# Patient Record
Sex: FEMALE | Race: WHITE | NOT HISPANIC OR LATINO | URBAN - METROPOLITAN AREA
[De-identification: names, ages, dates, MRNs, and addresses within clinical notes are randomized per-mention and may not be internally consistent; named-entity substitution may affect disease eponyms.]

---

## 2017-09-08 ENCOUNTER — OUTPATIENT (OUTPATIENT)
Dept: OUTPATIENT SERVICES | Facility: HOSPITAL | Age: 77
LOS: 1 days | End: 2017-09-08
Payer: COMMERCIAL

## 2017-09-08 ENCOUNTER — APPOINTMENT (OUTPATIENT)
Dept: RADIOLOGY | Facility: IMAGING CENTER | Age: 77
End: 2017-09-08
Payer: COMMERCIAL

## 2017-09-08 DIAGNOSIS — S82.92XA UNSPECIFIED FRACTURE OF LEFT LOWER LEG, INITIAL ENCOUNTER FOR CLOSED FRACTURE: Chronic | ICD-10-CM

## 2017-09-08 DIAGNOSIS — Z00.8 ENCOUNTER FOR OTHER GENERAL EXAMINATION: ICD-10-CM

## 2017-09-08 PROCEDURE — 72070 X-RAY EXAM THORAC SPINE 2VWS: CPT

## 2017-09-08 PROCEDURE — 72070 X-RAY EXAM THORAC SPINE 2VWS: CPT | Mod: 26

## 2017-09-18 ENCOUNTER — APPOINTMENT (OUTPATIENT)
Dept: ORTHOPEDIC SURGERY | Facility: CLINIC | Age: 77
End: 2017-09-18
Payer: COMMERCIAL

## 2017-09-18 VITALS — DIASTOLIC BLOOD PRESSURE: 84 MMHG | HEART RATE: 90 BPM | SYSTOLIC BLOOD PRESSURE: 122 MMHG

## 2017-09-18 PROCEDURE — 99204 OFFICE O/P NEW MOD 45 MIN: CPT

## 2017-09-30 ENCOUNTER — FORM ENCOUNTER (OUTPATIENT)
Age: 77
End: 2017-09-30

## 2017-10-01 ENCOUNTER — APPOINTMENT (OUTPATIENT)
Dept: MRI IMAGING | Facility: CLINIC | Age: 77
End: 2017-10-01
Payer: COMMERCIAL

## 2017-10-01 ENCOUNTER — OUTPATIENT (OUTPATIENT)
Dept: OUTPATIENT SERVICES | Facility: HOSPITAL | Age: 77
LOS: 1 days | End: 2017-10-01
Payer: COMMERCIAL

## 2017-10-01 DIAGNOSIS — Z00.8 ENCOUNTER FOR OTHER GENERAL EXAMINATION: ICD-10-CM

## 2017-10-01 DIAGNOSIS — S82.92XA UNSPECIFIED FRACTURE OF LEFT LOWER LEG, INITIAL ENCOUNTER FOR CLOSED FRACTURE: Chronic | ICD-10-CM

## 2017-10-01 PROCEDURE — 72146 MRI CHEST SPINE W/O DYE: CPT | Mod: 26

## 2017-10-01 PROCEDURE — 72146 MRI CHEST SPINE W/O DYE: CPT

## 2017-10-09 ENCOUNTER — APPOINTMENT (OUTPATIENT)
Dept: ORTHOPEDIC SURGERY | Facility: CLINIC | Age: 77
End: 2017-10-09
Payer: MEDICARE

## 2017-10-09 VITALS — HEART RATE: 84 BPM | SYSTOLIC BLOOD PRESSURE: 137 MMHG | DIASTOLIC BLOOD PRESSURE: 85 MMHG

## 2017-10-09 VITALS — BODY MASS INDEX: 20.83 KG/M2 | WEIGHT: 122 LBS | HEIGHT: 64 IN

## 2017-10-09 PROCEDURE — 99214 OFFICE O/P EST MOD 30 MIN: CPT

## 2017-11-20 ENCOUNTER — APPOINTMENT (OUTPATIENT)
Dept: ORTHOPEDIC SURGERY | Facility: CLINIC | Age: 77
End: 2017-11-20
Payer: COMMERCIAL

## 2017-11-20 VITALS — HEART RATE: 79 BPM | DIASTOLIC BLOOD PRESSURE: 94 MMHG | SYSTOLIC BLOOD PRESSURE: 160 MMHG

## 2017-11-20 PROCEDURE — 99214 OFFICE O/P EST MOD 30 MIN: CPT

## 2017-11-20 PROCEDURE — 72070 X-RAY EXAM THORAC SPINE 2VWS: CPT

## 2017-11-28 ENCOUNTER — OUTPATIENT (OUTPATIENT)
Dept: OUTPATIENT SERVICES | Facility: HOSPITAL | Age: 77
LOS: 1 days | Discharge: HOME | End: 2017-11-28

## 2017-11-28 DIAGNOSIS — D47.2 MONOCLONAL GAMMOPATHY: ICD-10-CM

## 2017-11-28 DIAGNOSIS — S82.92XA UNSPECIFIED FRACTURE OF LEFT LOWER LEG, INITIAL ENCOUNTER FOR CLOSED FRACTURE: Chronic | ICD-10-CM

## 2017-12-01 ENCOUNTER — LABORATORY RESULT (OUTPATIENT)
Age: 77
End: 2017-12-01

## 2017-12-01 ENCOUNTER — RESULT REVIEW (OUTPATIENT)
Age: 77
End: 2017-12-01

## 2017-12-01 ENCOUNTER — APPOINTMENT (OUTPATIENT)
Dept: HEMATOLOGY ONCOLOGY | Facility: CLINIC | Age: 77
End: 2017-12-01
Payer: COMMERCIAL

## 2017-12-01 VITALS
RESPIRATION RATE: 16 BRPM | BODY MASS INDEX: 21.19 KG/M2 | WEIGHT: 123.46 LBS | HEART RATE: 88 BPM | SYSTOLIC BLOOD PRESSURE: 152 MMHG | OXYGEN SATURATION: 99 % | DIASTOLIC BLOOD PRESSURE: 94 MMHG | TEMPERATURE: 97.9 F

## 2017-12-01 LAB
ALBUMIN SERPL ELPH-MCNC: 4.4 G/DL
ALP BLD-CCNC: 72 U/L
ALT SERPL-CCNC: 17 U/L
ANION GAP SERPL CALC-SCNC: 14 MMOL/L
AST SERPL-CCNC: 25 U/L
BASOPHILS # BLD AUTO: 0.1 K/UL — SIGNIFICANT CHANGE UP (ref 0–0.2)
BASOPHILS NFR BLD AUTO: 0.9 % — SIGNIFICANT CHANGE UP (ref 0–2)
BILIRUB SERPL-MCNC: 0.5 MG/DL
BUN SERPL-MCNC: 32 MG/DL
CALCIUM SERPL-MCNC: 10.4 MG/DL
CHLORIDE SERPL-SCNC: 100 MMOL/L
CO2 SERPL-SCNC: 25 MMOL/L
CREAT SERPL-MCNC: 1.29 MG/DL
EOSINOPHIL # BLD AUTO: 0.3 K/UL — SIGNIFICANT CHANGE UP (ref 0–0.5)
EOSINOPHIL NFR BLD AUTO: 3.2 % — SIGNIFICANT CHANGE UP (ref 0–6)
GLUCOSE SERPL-MCNC: 107 MG/DL
HCT VFR BLD CALC: 38.7 % — SIGNIFICANT CHANGE UP (ref 34.5–45)
HGB BLD-MCNC: 13 G/DL — SIGNIFICANT CHANGE UP (ref 11.5–15.5)
LDH SERPL-CCNC: 226 U/L
LYMPHOCYTES # BLD AUTO: 2.4 K/UL — SIGNIFICANT CHANGE UP (ref 1–3.3)
LYMPHOCYTES # BLD AUTO: 29.2 % — SIGNIFICANT CHANGE UP (ref 13–44)
MCHC RBC-ENTMCNC: 29.3 PG — SIGNIFICANT CHANGE UP (ref 27–34)
MCHC RBC-ENTMCNC: 33.5 G/DL — SIGNIFICANT CHANGE UP (ref 32–36)
MCV RBC AUTO: 87.5 FL — SIGNIFICANT CHANGE UP (ref 80–100)
MONOCYTES # BLD AUTO: 0.6 K/UL — SIGNIFICANT CHANGE UP (ref 0–0.9)
MONOCYTES NFR BLD AUTO: 7.2 % — SIGNIFICANT CHANGE UP (ref 2–14)
NEUTROPHILS # BLD AUTO: 4.9 K/UL — SIGNIFICANT CHANGE UP (ref 1.8–7.4)
NEUTROPHILS NFR BLD AUTO: 59.5 % — SIGNIFICANT CHANGE UP (ref 43–77)
PLATELET # BLD AUTO: 478 K/UL — HIGH (ref 150–400)
POTASSIUM SERPL-SCNC: 5.9 MMOL/L
PROT SERPL-MCNC: 8.8 G/DL
RBC # BLD: 4.43 M/UL — SIGNIFICANT CHANGE UP (ref 3.8–5.2)
RBC # FLD: 13.4 % — SIGNIFICANT CHANGE UP (ref 10.3–14.5)
SODIUM SERPL-SCNC: 139 MMOL/L
WBC # BLD: 8.2 K/UL — SIGNIFICANT CHANGE UP (ref 3.8–10.5)
WBC # FLD AUTO: 8.2 K/UL — SIGNIFICANT CHANGE UP (ref 3.8–10.5)

## 2017-12-01 PROCEDURE — 99214 OFFICE O/P EST MOD 30 MIN: CPT

## 2017-12-04 ENCOUNTER — APPOINTMENT (OUTPATIENT)
Dept: HEMATOLOGY ONCOLOGY | Facility: CLINIC | Age: 77
End: 2017-12-04

## 2017-12-04 ENCOUNTER — RESULT REVIEW (OUTPATIENT)
Age: 77
End: 2017-12-04

## 2017-12-04 LAB
BASOPHILS # BLD AUTO: 0 K/UL — SIGNIFICANT CHANGE UP (ref 0–0.2)
BASOPHILS NFR BLD AUTO: 0.6 % — SIGNIFICANT CHANGE UP (ref 0–2)
EOSINOPHIL # BLD AUTO: 0.1 K/UL — SIGNIFICANT CHANGE UP (ref 0–0.5)
EOSINOPHIL NFR BLD AUTO: 2 % — SIGNIFICANT CHANGE UP (ref 0–6)
HCT VFR BLD CALC: 39.4 % — SIGNIFICANT CHANGE UP (ref 34.5–45)
HGB BLD-MCNC: 12.9 G/DL — SIGNIFICANT CHANGE UP (ref 11.5–15.5)
LYMPHOCYTES # BLD AUTO: 1.9 K/UL — SIGNIFICANT CHANGE UP (ref 1–3.3)
LYMPHOCYTES # BLD AUTO: 26.3 % — SIGNIFICANT CHANGE UP (ref 13–44)
MCHC RBC-ENTMCNC: 28.5 PG — SIGNIFICANT CHANGE UP (ref 27–34)
MCHC RBC-ENTMCNC: 32.8 G/DL — SIGNIFICANT CHANGE UP (ref 32–36)
MCV RBC AUTO: 87 FL — SIGNIFICANT CHANGE UP (ref 80–100)
MONOCYTES # BLD AUTO: 0.4 K/UL — SIGNIFICANT CHANGE UP (ref 0–0.9)
MONOCYTES NFR BLD AUTO: 6.2 % — SIGNIFICANT CHANGE UP (ref 2–14)
NEUTROPHILS # BLD AUTO: 4.6 K/UL — SIGNIFICANT CHANGE UP (ref 1.8–7.4)
NEUTROPHILS NFR BLD AUTO: 64.8 % — SIGNIFICANT CHANGE UP (ref 43–77)
PLATELET # BLD AUTO: 400 K/UL — SIGNIFICANT CHANGE UP (ref 150–400)
RBC # BLD: 4.53 M/UL — SIGNIFICANT CHANGE UP (ref 3.8–5.2)
RBC # FLD: 13.1 % — SIGNIFICANT CHANGE UP (ref 10.3–14.5)
WBC # BLD: 7.1 K/UL — SIGNIFICANT CHANGE UP (ref 3.8–10.5)
WBC # FLD AUTO: 7.1 K/UL — SIGNIFICANT CHANGE UP (ref 3.8–10.5)

## 2017-12-18 LAB
ALBUMIN MFR SERPL ELPH: 51.2 %
ALBUMIN SERPL-MCNC: 4.5 G/DL
ALBUMIN/GLOB SERPL: 1 RATIO
ALPHA1 GLOB MFR SERPL ELPH: 3.8 %
ALPHA1 GLOB SERPL ELPH-MCNC: 0.3 G/DL
ALPHA2 GLOB MFR SERPL ELPH: 8.6 %
ALPHA2 GLOB SERPL ELPH-MCNC: 0.8 G/DL
ANION GAP SERPL CALC-SCNC: 14 MMOL/L
B-GLOBULIN MFR SERPL ELPH: 13.2 %
B-GLOBULIN SERPL ELPH-MCNC: 1.2 G/DL
BUN SERPL-MCNC: 34 MG/DL
CALCIUM SERPL-MCNC: 10 MG/DL
CHLORIDE SERPL-SCNC: 96 MMOL/L
CO2 SERPL-SCNC: 23 MMOL/L
CREAT SERPL-MCNC: 1.31 MG/DL
DEPRECATED KAPPA LC FREE/LAMBDA SER: 1.47 RATIO
DEPRECATED KAPPA LC FREE/LAMBDA SER: 1.47 RATIO
GAMMA GLOB FLD ELPH-MCNC: 2 G/DL
GAMMA GLOB MFR SERPL ELPH: 23.2 %
GLUCOSE SERPL-MCNC: 100 MG/DL
IGA SER QL IEP: 515 MG/DL
IGG SER QL IEP: 1640 MG/DL
IGM SER QL IEP: 154 MG/DL
INTERPRETATION SERPL IEP-IMP: NORMAL
KAPPA LC CSF-MCNC: 4.21 MG/DL
KAPPA LC CSF-MCNC: 4.21 MG/DL
KAPPA LC SERPL-MCNC: 6.2 MG/DL
KAPPA LC SERPL-MCNC: 6.2 MG/DL
M PROTEIN MFR SERPL ELPH: NORMAL %
MONOCLON BAND OBS SERPL: NORMAL G/DL
POTASSIUM SERPL-SCNC: 5.1 MMOL/L
PROT SERPL-MCNC: 8.8 G/DL
PROT SERPL-MCNC: 8.8 G/DL
SODIUM SERPL-SCNC: 133 MMOL/L

## 2018-05-17 ENCOUNTER — APPOINTMENT (OUTPATIENT)
Dept: VASCULAR SURGERY | Facility: CLINIC | Age: 78
End: 2018-05-17
Payer: COMMERCIAL

## 2018-05-17 PROCEDURE — 93971 EXTREMITY STUDY: CPT

## 2018-06-07 ENCOUNTER — OUTPATIENT (OUTPATIENT)
Dept: OUTPATIENT SERVICES | Facility: HOSPITAL | Age: 78
LOS: 1 days | Discharge: HOME | End: 2018-06-07

## 2018-06-07 DIAGNOSIS — S82.92XA UNSPECIFIED FRACTURE OF LEFT LOWER LEG, INITIAL ENCOUNTER FOR CLOSED FRACTURE: Chronic | ICD-10-CM

## 2018-06-07 DIAGNOSIS — D47.2 MONOCLONAL GAMMOPATHY: ICD-10-CM

## 2018-06-08 ENCOUNTER — RESULT REVIEW (OUTPATIENT)
Age: 78
End: 2018-06-08

## 2018-06-08 ENCOUNTER — LABORATORY RESULT (OUTPATIENT)
Age: 78
End: 2018-06-08

## 2018-06-08 ENCOUNTER — APPOINTMENT (OUTPATIENT)
Dept: HEMATOLOGY ONCOLOGY | Facility: CLINIC | Age: 78
End: 2018-06-08
Payer: COMMERCIAL

## 2018-06-08 VITALS
HEART RATE: 88 BPM | SYSTOLIC BLOOD PRESSURE: 107 MMHG | TEMPERATURE: 98.7 F | RESPIRATION RATE: 16 BRPM | WEIGHT: 120.15 LBS | OXYGEN SATURATION: 97 % | BODY MASS INDEX: 20.62 KG/M2 | DIASTOLIC BLOOD PRESSURE: 69 MMHG

## 2018-06-08 DIAGNOSIS — N39.0 URINARY TRACT INFECTION, SITE NOT SPECIFIED: ICD-10-CM

## 2018-06-08 LAB
ALBUMIN SERPL ELPH-MCNC: 4.6 G/DL
ALP BLD-CCNC: 70 U/L
ALT SERPL-CCNC: 20 U/L
ANION GAP SERPL CALC-SCNC: 18 MMOL/L
ANISOCYTOSIS BLD QL: SLIGHT — SIGNIFICANT CHANGE UP
APPEARANCE: ABNORMAL
AST SERPL-CCNC: 23 U/L
BACTERIA: ABNORMAL
BASOPHILS # BLD AUTO: 0.1 K/UL — SIGNIFICANT CHANGE UP (ref 0–0.2)
BILIRUB SERPL-MCNC: 1 MG/DL
BILIRUBIN URINE: NEGATIVE
BLOOD URINE: NEGATIVE
BUN SERPL-MCNC: 45 MG/DL
CALCIUM SERPL-MCNC: 10.7 MG/DL
CHLORIDE SERPL-SCNC: 97 MMOL/L
CO2 SERPL-SCNC: 21 MMOL/L
COLOR: ABNORMAL
CREAT SERPL-MCNC: 1.78 MG/DL
EOSINOPHIL # BLD AUTO: 0.1 K/UL — SIGNIFICANT CHANGE UP (ref 0–0.5)
GLUCOSE QUALITATIVE U: NEGATIVE MG/DL
GLUCOSE SERPL-MCNC: 112 MG/DL
HCT VFR BLD CALC: 36.4 % — SIGNIFICANT CHANGE UP (ref 34.5–45)
HGB BLD-MCNC: 12.3 G/DL — SIGNIFICANT CHANGE UP (ref 11.5–15.5)
HYALINE CASTS: 2 /LPF
KETONES URINE: NEGATIVE
LDH SERPL-CCNC: 221 U/L
LEUKOCYTE ESTERASE URINE: ABNORMAL
LYMPHOCYTES # BLD AUTO: 0.9 K/UL — LOW (ref 1–3.3)
LYMPHOCYTES # BLD AUTO: 2 % — LOW (ref 13–44)
MCHC RBC-ENTMCNC: 29.2 PG — SIGNIFICANT CHANGE UP (ref 27–34)
MCHC RBC-ENTMCNC: 33.6 G/DL — SIGNIFICANT CHANGE UP (ref 32–36)
MCV RBC AUTO: 86.7 FL — SIGNIFICANT CHANGE UP (ref 80–100)
MICROSCOPIC-UA: NORMAL
MONOCYTES # BLD AUTO: 0.8 K/UL — SIGNIFICANT CHANGE UP (ref 0–0.9)
MONOCYTES NFR BLD AUTO: 2 % — SIGNIFICANT CHANGE UP (ref 2–14)
NEUTROPHILS # BLD AUTO: 22.6 K/UL — HIGH (ref 1.8–7.4)
NEUTROPHILS NFR BLD AUTO: 95 % — HIGH (ref 43–77)
NEUTS BAND # BLD: 1 % — SIGNIFICANT CHANGE UP (ref 0–8)
NITRITE URINE: NEGATIVE
PH URINE: 5
PLAT MORPH BLD: NORMAL — SIGNIFICANT CHANGE UP
PLATELET # BLD AUTO: 526 K/UL — HIGH (ref 150–400)
POIKILOCYTOSIS BLD QL AUTO: SLIGHT — SIGNIFICANT CHANGE UP
POTASSIUM SERPL-SCNC: 4.7 MMOL/L
PROT SERPL-MCNC: 8.8 G/DL
PROTEIN URINE: ABNORMAL MG/DL
RBC # BLD: 4.2 M/UL — SIGNIFICANT CHANGE UP (ref 3.8–5.2)
RBC # FLD: 13 % — SIGNIFICANT CHANGE UP (ref 10.3–14.5)
RBC BLD AUTO: SIGNIFICANT CHANGE UP
RED BLOOD CELLS URINE: 6 /HPF
SODIUM SERPL-SCNC: 136 MMOL/L
SPECIFIC GRAVITY URINE: 1.02
SQUAMOUS EPITHELIAL CELLS: 7 /HPF
UROBILINOGEN URINE: NEGATIVE MG/DL
WBC # BLD: 24.5 K/UL — HIGH (ref 3.8–10.5)
WBC # FLD AUTO: 24.5 K/UL — HIGH (ref 3.8–10.5)
WHITE BLOOD CELLS URINE: 30 /HPF

## 2018-06-08 PROCEDURE — 99214 OFFICE O/P EST MOD 30 MIN: CPT

## 2018-06-14 ENCOUNTER — RESULT REVIEW (OUTPATIENT)
Age: 78
End: 2018-06-14

## 2018-06-21 LAB
ALBUMIN MFR SERPL ELPH: 49.9 %
ALBUMIN SERPL-MCNC: 4.4 G/DL
ALBUMIN/GLOB SERPL: 1 RATIO
ALPHA1 GLOB MFR SERPL ELPH: 4.3 %
ALPHA1 GLOB SERPL ELPH-MCNC: 0.4 G/DL
ALPHA2 GLOB MFR SERPL ELPH: 9 %
ALPHA2 GLOB SERPL ELPH-MCNC: 0.8 G/DL
B-GLOBULIN MFR SERPL ELPH: 13.7 %
B-GLOBULIN SERPL ELPH-MCNC: 1.2 G/DL
DEPRECATED KAPPA LC FREE/LAMBDA SER: 1.2 RATIO
DEPRECATED KAPPA LC FREE/LAMBDA SER: 1.2 RATIO
GAMMA GLOB FLD ELPH-MCNC: 2 G/DL
GAMMA GLOB MFR SERPL ELPH: 23.1 %
IGA SER QL IEP: 558 MG/DL
IGG SER QL IEP: 2042 MG/DL
IGM SER QL IEP: 143 MG/DL
INTERPRETATION SERPL IEP-IMP: NORMAL
KAPPA LC CSF-MCNC: 5.72 MG/DL
KAPPA LC CSF-MCNC: 5.72 MG/DL
KAPPA LC SERPL-MCNC: 6.85 MG/DL
KAPPA LC SERPL-MCNC: 6.85 MG/DL
M PROTEIN MFR SERPL ELPH: 2.9 %
MONOCLON BAND OBS SERPL: 0.3 G/DL
PROT SERPL-MCNC: 8.8 G/DL
PROT SERPL-MCNC: 8.8 G/DL

## 2018-08-04 ENCOUNTER — EMERGENCY (EMERGENCY)
Facility: HOSPITAL | Age: 78
LOS: 1 days | End: 2018-08-04
Attending: EMERGENCY MEDICINE
Payer: COMMERCIAL

## 2018-08-04 VITALS
SYSTOLIC BLOOD PRESSURE: 165 MMHG | HEIGHT: 63 IN | DIASTOLIC BLOOD PRESSURE: 98 MMHG | WEIGHT: 121.92 LBS | HEART RATE: 90 BPM | TEMPERATURE: 98 F | OXYGEN SATURATION: 99 % | RESPIRATION RATE: 18 BRPM

## 2018-08-04 VITALS
SYSTOLIC BLOOD PRESSURE: 139 MMHG | HEART RATE: 84 BPM | TEMPERATURE: 98 F | DIASTOLIC BLOOD PRESSURE: 97 MMHG | OXYGEN SATURATION: 99 % | RESPIRATION RATE: 17 BRPM

## 2018-08-04 DIAGNOSIS — S82.92XA UNSPECIFIED FRACTURE OF LEFT LOWER LEG, INITIAL ENCOUNTER FOR CLOSED FRACTURE: Chronic | ICD-10-CM

## 2018-08-04 PROCEDURE — 93971 EXTREMITY STUDY: CPT

## 2018-08-04 PROCEDURE — 82962 GLUCOSE BLOOD TEST: CPT

## 2018-08-04 PROCEDURE — 93971 EXTREMITY STUDY: CPT | Mod: 26

## 2018-08-04 PROCEDURE — 99284 EMERGENCY DEPT VISIT MOD MDM: CPT | Mod: 25

## 2018-08-04 PROCEDURE — 99284 EMERGENCY DEPT VISIT MOD MDM: CPT

## 2018-08-04 NOTE — ED PROVIDER NOTE - MEDICAL DECISION MAKING DETAILS
story is consistent with bppv, asymptomatic now, offered to do maneuvers, pt declines at this time and would rather go to vestibular rehab. also noted right lower extremity occasionally swollen and requested to do doppler. at this time leg is not noticeably swollen.

## 2018-08-04 NOTE — ED ADULT NURSE NOTE - NSIMPLEMENTINTERV_GEN_ALL_ED
Implemented All Fall with Harm Risk Interventions:  [place] to call system. Call bell, personal items and telephone within reach. Instruct patient to call for assistance. Room bathroom lighting operational. Non-slip footwear when patient is off stretcher. Physically safe environment: no spills, clutter or unnecessary equipment. Stretcher in lowest position, wheels locked, appropriate side rails in place. Provide visual cue, wrist band, yellow gown, etc. Monitor gait and stability. Monitor for mental status changes and reorient to person, place, and time. Review medications for side effects contributing to fall risk. Reinforce activity limits and safety measures with patient and family. Provide visual clues: red socks.

## 2018-08-04 NOTE — ED PROVIDER NOTE - OBJECTIVE STATEMENT
78 y/o F with pmhx of UTI, wrist fx, osteoporosis, HTN, and pshx of left leg fx presents after an episode of dizziness last night around 10 pm. She notes onset of the dizziness when she tilted her head down and back up. Noted the room felt like it was spinning. Associated sensation of feeling unable to ambulate. Per , whole episode lasted around 15 minutes. Pt notes similar episode last week when she was in the car, onset of dizziness associated with head movement. Asymptomatic at this time. Denies cp, sob, weakness in arms and legs, or any other complaints. Pt had doppler study done 1 month ago, no blood clot found. Not on any blood thinners. 78 y/o F with pmhx of DVT, UTI, wrist fx, osteoporosis, HTN, and pshx of left leg fx presents after an episode of dizziness last night around 10 pm. She notes onset of the dizziness when she tilted her head down and back up. Noted the room felt like it was spinning. Associated sensation of feeling unable to ambulate. Per , whole episode lasted around 15 minutes. Pt notes similar episode last week when she was in the car, onset of dizziness associated with head movement. Asymptomatic at this time. Denies cp, sob, weakness in arms and legs, or any other complaints. Pt had doppler study done 1 month ago, no blood clot found. Not on any blood thinners.

## 2018-08-04 NOTE — ED ADULT NURSE NOTE - CHPI ED NUR SYMPTOMS NEG
no blurred vision/no change in level of consciousness/no confusion/no numbness/no loss of consciousness/no fever/no weakness/no nausea/no vomiting

## 2018-08-04 NOTE — ED ADULT NURSE NOTE - OBJECTIVE STATEMENT
Patient accompanied by her  to ED c/o episode of dizziness which occurred around 2200 last night and lasted for about 15 minutes. Patient states she was looking down and when she picked her head up and looked up, the dizziness started. Patient describes the dizziness as room spinning and states she was unable to walk due to loss of balance. Patient states the symptoms resolved on their own after she rested. Denies any chest pain, shortness of breath or palpitations. Denies any nausea, vomiting diarrhea or abdominal pain. Patient reports similar episode happening several days ago. Denies hitting her head, denies any falls. Mild BLE edema noted with redness which patient states is chronic. Patient states the edema resolves when she elevates her legs. Patient states her PMD is aware of edema and she has had doppler studies last month to r/o DVT, as well as seen a dermatologist for the redness. Denies any worsening of swelling or redness.

## 2018-08-04 NOTE — ED ADULT NURSE NOTE - PMH
DVT (deep venous thrombosis), left  2009 txted with coumadin x 6 months  HTN (hypertension)    Osteoporosis    UTI (urinary tract infection)  1 week ago txted by pmd  Wrist fracture

## 2018-08-04 NOTE — ED PROVIDER NOTE - PHYSICAL EXAMINATION
Gen: well appearing, of stated age, no acute distress; Head: NC, AT; ENT: MMM, no uvular deviation; Neck: supple with full ROM; Chest: CTAB, no retractions, rate normal, appears to breath comfortable; Heart: RRR S1S2 No JVD No peripheral edema b/l pulses 2+ in arms and legs; Abd: Soft non-tender, no rebound or guarding, no masses, no hernandez sign, no mcburney tenderness, no CVAT; Back: No spinal deformity; Ext: Moving all 4 limbs without obvious impairment to ROM, no obvious weakness; Neuro: gait normal, no nystagmus, normal finger to nose, fluid speech, no focal deficits, oriented to person, place, situation; Psych: No anxiety, depression or pressured speech noted; Skin: no utricaria, no diffuse rash. -ncohen

## 2018-11-30 ENCOUNTER — OUTPATIENT (OUTPATIENT)
Dept: OUTPATIENT SERVICES | Facility: HOSPITAL | Age: 78
LOS: 1 days | Discharge: HOME | End: 2018-11-30

## 2018-11-30 DIAGNOSIS — S82.92XA UNSPECIFIED FRACTURE OF LEFT LOWER LEG, INITIAL ENCOUNTER FOR CLOSED FRACTURE: Chronic | ICD-10-CM

## 2018-11-30 DIAGNOSIS — D47.2 MONOCLONAL GAMMOPATHY: ICD-10-CM

## 2018-12-07 ENCOUNTER — RESULT REVIEW (OUTPATIENT)
Age: 78
End: 2018-12-07

## 2018-12-07 ENCOUNTER — APPOINTMENT (OUTPATIENT)
Dept: HEMATOLOGY ONCOLOGY | Facility: CLINIC | Age: 78
End: 2018-12-07
Payer: COMMERCIAL

## 2018-12-07 ENCOUNTER — LABORATORY RESULT (OUTPATIENT)
Age: 78
End: 2018-12-07

## 2018-12-07 ENCOUNTER — OUTPATIENT (OUTPATIENT)
Dept: OUTPATIENT SERVICES | Facility: HOSPITAL | Age: 78
LOS: 1 days | End: 2018-12-07
Payer: COMMERCIAL

## 2018-12-07 VITALS
BODY MASS INDEX: 21.19 KG/M2 | TEMPERATURE: 97.7 F | WEIGHT: 123.46 LBS | RESPIRATION RATE: 16 BRPM | HEART RATE: 83 BPM | SYSTOLIC BLOOD PRESSURE: 154 MMHG | OXYGEN SATURATION: 99 % | DIASTOLIC BLOOD PRESSURE: 87 MMHG

## 2018-12-07 DIAGNOSIS — S82.92XA UNSPECIFIED FRACTURE OF LEFT LOWER LEG, INITIAL ENCOUNTER FOR CLOSED FRACTURE: Chronic | ICD-10-CM

## 2018-12-07 DIAGNOSIS — D47.3 ESSENTIAL (HEMORRHAGIC) THROMBOCYTHEMIA: ICD-10-CM

## 2018-12-07 LAB
BASOPHILS # BLD AUTO: 0.1 K/UL — SIGNIFICANT CHANGE UP (ref 0–0.2)
BASOPHILS NFR BLD AUTO: 0.9 % — SIGNIFICANT CHANGE UP (ref 0–2)
EOSINOPHIL # BLD AUTO: 0.2 K/UL — SIGNIFICANT CHANGE UP (ref 0–0.5)
EOSINOPHIL NFR BLD AUTO: 2.7 % — SIGNIFICANT CHANGE UP (ref 0–6)
HCT VFR BLD CALC: 37.2 % — SIGNIFICANT CHANGE UP (ref 34.5–45)
HGB BLD-MCNC: 12.5 G/DL — SIGNIFICANT CHANGE UP (ref 11.5–15.5)
LYMPHOCYTES # BLD AUTO: 2.3 K/UL — SIGNIFICANT CHANGE UP (ref 1–3.3)
LYMPHOCYTES # BLD AUTO: 27.1 % — SIGNIFICANT CHANGE UP (ref 13–44)
MCHC RBC-ENTMCNC: 28.4 PG — SIGNIFICANT CHANGE UP (ref 27–34)
MCHC RBC-ENTMCNC: 33.7 G/DL — SIGNIFICANT CHANGE UP (ref 32–36)
MCV RBC AUTO: 84.3 FL — SIGNIFICANT CHANGE UP (ref 80–100)
MONOCYTES # BLD AUTO: 0.5 K/UL — SIGNIFICANT CHANGE UP (ref 0–0.9)
MONOCYTES NFR BLD AUTO: 6.3 % — SIGNIFICANT CHANGE UP (ref 2–14)
NEUTROPHILS # BLD AUTO: 5.4 K/UL — SIGNIFICANT CHANGE UP (ref 1.8–7.4)
NEUTROPHILS NFR BLD AUTO: 62.9 % — SIGNIFICANT CHANGE UP (ref 43–77)
PLATELET # BLD AUTO: 567 K/UL — HIGH (ref 150–400)
RBC # BLD: 4.41 M/UL — SIGNIFICANT CHANGE UP (ref 3.8–5.2)
RBC # FLD: 13.1 % — SIGNIFICANT CHANGE UP (ref 10.3–14.5)
WBC # BLD: 8.6 K/UL — SIGNIFICANT CHANGE UP (ref 3.8–10.5)
WBC # FLD AUTO: 8.6 K/UL — SIGNIFICANT CHANGE UP (ref 3.8–10.5)

## 2018-12-07 PROCEDURE — 99214 OFFICE O/P EST MOD 30 MIN: CPT

## 2018-12-07 PROCEDURE — G0452: CPT | Mod: 26

## 2018-12-07 PROCEDURE — 81270 JAK2 GENE: CPT

## 2018-12-07 RX ORDER — TOBRAMYCIN AND DEXAMETHASONE 3; 1 MG/ML; MG/ML
0.3-0.1 SUSPENSION/ DROPS OPHTHALMIC
Qty: 5 | Refills: 0 | Status: DISCONTINUED | COMMUNITY
Start: 2017-08-22 | End: 2018-12-07

## 2018-12-07 RX ORDER — NITROFURANTOIN (MONOHYDRATE/MACROCRYSTALS) 25; 75 MG/1; MG/1
100 CAPSULE ORAL
Qty: 10 | Refills: 0 | Status: DISCONTINUED | COMMUNITY
Start: 2018-02-26 | End: 2018-12-07

## 2018-12-07 NOTE — HISTORY OF PRESENT ILLNESS
[Disease:__________________________] : Disease: [unfilled] [de-identified] : Factor V Leiden heterozygote\par LLE DVT post-op [de-identified] : IgGk [de-identified] : Feels well.  No fever, night sweats, swollen glands, weight loss, HA, visual problems, bleeding, bruising, skeletal pain,chest pain, SOB, abdominal pain, leg pain/swelling. RA is controlled. Wearing Jobst stockings and begun on ASA 1 month ago by Vascular Surgery due to varicose veins. Reports follow up urine culture after last visit was negative.\par \par

## 2018-12-07 NOTE — ASSESSMENT
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 Doing well. Platelets have been rising. will evaluate further. Is already on ASA.\par \par Plan:\par Observe\par CMP, LDH, SPEP, quant Ig, SFLC\par JAK2\par ASA\par Flu vaccine this Fall\par Jobst stockings\par RTC 6 months. \par \par  [Palliative Care Plan] : not applicable at this time

## 2018-12-07 NOTE — CONSULT LETTER
[Dear  ___] : Dear  [unfilled], [Courtesy Letter:] : I had the pleasure of seeing your patient, [unfilled], in my office today. [Sincerely,] : Sincerely, [FreeTextEntry2] : Bernice Chaudhary MD [FreeTextEntry3] : Octaviano Ching M.D., FACP\par Professor of Medicine\par [employer]\par Associate Chief, Division of Hematology\par [facility]\par [facility]\par [address] [place] [address]\[place] [address]\par [phone]\par \par \par \par   [DrKyle  ___] : Dr. PALOMO

## 2018-12-07 NOTE — REASON FOR VISIT
[Follow-Up Visit] : a follow-up visit for [Coagulopathy] : coagulopathy [Monoclonal Gammopathy] : monoclonal gammopathy

## 2018-12-07 NOTE — RESULTS/DATA
[FreeTextEntry1] : WBC 8600 Hgb 12.5 Hct 37.2 Platelets 567,000 Diff normal\par \par 6/8/18\par CMP: creat 1.78, globulins 4.2, Ca 10.7\par \par SPEP M 0.3\par IgG 2042, A 558, M 143\par SFLC k 6.85, lambda 5.72

## 2018-12-07 NOTE — PHYSICAL EXAM
[Fully active, able to carry on all pre-disease performance without restriction] : Status 0 - Fully active, able to carry on all pre-disease performance without restriction [Normal] : affect appropriate [de-identified] : RRR. S1S2 normal. Gr 2/6 KARISHMA apex to left axilla. No gallops.

## 2018-12-13 LAB — JAK2 P.V617F BLD/T QL: SIGNIFICANT CHANGE UP

## 2018-12-27 LAB
ALBUMIN MFR SERPL ELPH: 50.4 %
ALBUMIN SERPL ELPH-MCNC: 4.2 G/DL
ALBUMIN SERPL-MCNC: 4 G/DL
ALBUMIN/GLOB SERPL: 1 RATIO
ALP BLD-CCNC: 77 U/L
ALPHA1 GLOB MFR SERPL ELPH: 3.6 %
ALPHA1 GLOB SERPL ELPH-MCNC: 0.3 G/DL
ALPHA2 GLOB MFR SERPL ELPH: 8.8 %
ALPHA2 GLOB SERPL ELPH-MCNC: 0.7 G/DL
ALT SERPL-CCNC: 17 U/L
ANION GAP SERPL CALC-SCNC: 10 MMOL/L
AST SERPL-CCNC: 21 U/L
B-GLOBULIN MFR SERPL ELPH: 13.4 %
B-GLOBULIN SERPL ELPH-MCNC: 1.1 G/DL
BILIRUB SERPL-MCNC: 0.6 MG/DL
BUN SERPL-MCNC: 31 MG/DL
CALCIUM SERPL-MCNC: 10 MG/DL
CHLORIDE SERPL-SCNC: 104 MMOL/L
CO2 SERPL-SCNC: 24 MMOL/L
CREAT SERPL-MCNC: 1.33 MG/DL
DEPRECATED KAPPA LC FREE/LAMBDA SER: 1.62 RATIO
DEPRECATED KAPPA LC FREE/LAMBDA SER: 1.62 RATIO
GAMMA GLOB FLD ELPH-MCNC: 1.9 G/DL
GAMMA GLOB MFR SERPL ELPH: 23.8 %
GLUCOSE SERPL-MCNC: 97 MG/DL
IGA SER QL IEP: 594 MG/DL
IGG SER QL IEP: 2146 MG/DL
IGM SER QL IEP: 148 MG/DL
INTERPRETATION SERPL IEP-IMP: NORMAL
KAPPA LC CSF-MCNC: 4 MG/DL
KAPPA LC CSF-MCNC: 4 MG/DL
KAPPA LC SERPL-MCNC: 6.48 MG/DL
KAPPA LC SERPL-MCNC: 6.48 MG/DL
LDH SERPL-CCNC: 203 U/L
M PROTEIN MFR SERPL ELPH: NORMAL %
MONOCLON BAND OBS SERPL: NORMAL G/DL
POTASSIUM SERPL-SCNC: 5.3 MMOL/L
PROT SERPL-MCNC: 8 G/DL
SODIUM SERPL-SCNC: 138 MMOL/L

## 2019-06-11 ENCOUNTER — OUTPATIENT (OUTPATIENT)
Dept: OUTPATIENT SERVICES | Facility: HOSPITAL | Age: 79
LOS: 1 days | Discharge: HOME | End: 2019-06-11

## 2019-06-11 DIAGNOSIS — D47.2 MONOCLONAL GAMMOPATHY: ICD-10-CM

## 2019-06-11 DIAGNOSIS — S82.92XA UNSPECIFIED FRACTURE OF LEFT LOWER LEG, INITIAL ENCOUNTER FOR CLOSED FRACTURE: Chronic | ICD-10-CM

## 2019-06-14 ENCOUNTER — APPOINTMENT (OUTPATIENT)
Dept: HEMATOLOGY ONCOLOGY | Facility: CLINIC | Age: 79
End: 2019-06-14

## 2019-08-01 ENCOUNTER — INPATIENT (INPATIENT)
Facility: HOSPITAL | Age: 79
LOS: 7 days | Discharge: NURSING, REHAB OR LONG-TERM CARE | DRG: 469 | End: 2019-08-09
Attending: ORTHOPAEDIC SURGERY | Admitting: ORTHOPAEDIC SURGERY
Payer: COMMERCIAL

## 2019-08-01 VITALS
OXYGEN SATURATION: 96 % | HEIGHT: 64 IN | TEMPERATURE: 98 F | RESPIRATION RATE: 18 BRPM | DIASTOLIC BLOOD PRESSURE: 86 MMHG | WEIGHT: 119.93 LBS | HEART RATE: 72 BPM | SYSTOLIC BLOOD PRESSURE: 148 MMHG

## 2019-08-01 DIAGNOSIS — S82.92XA UNSPECIFIED FRACTURE OF LEFT LOWER LEG, INITIAL ENCOUNTER FOR CLOSED FRACTURE: Chronic | ICD-10-CM

## 2019-08-01 LAB
ALBUMIN SERPL ELPH-MCNC: 4 G/DL — SIGNIFICANT CHANGE UP (ref 3.3–5)
ALP SERPL-CCNC: 68 U/L — SIGNIFICANT CHANGE UP (ref 40–120)
ALT FLD-CCNC: 16 U/L — SIGNIFICANT CHANGE UP (ref 10–45)
ANION GAP SERPL CALC-SCNC: 15 MMOL/L — SIGNIFICANT CHANGE UP (ref 5–17)
APTT BLD: 27 SEC — LOW (ref 27.5–36.3)
AST SERPL-CCNC: 19 U/L — SIGNIFICANT CHANGE UP (ref 10–40)
BASOPHILS # BLD AUTO: 0 K/UL — SIGNIFICANT CHANGE UP (ref 0–0.2)
BASOPHILS NFR BLD AUTO: 0.1 % — SIGNIFICANT CHANGE UP (ref 0–2)
BILIRUB SERPL-MCNC: 0.6 MG/DL — SIGNIFICANT CHANGE UP (ref 0.2–1.2)
BLD GP AB SCN SERPL QL: NEGATIVE — SIGNIFICANT CHANGE UP
BUN SERPL-MCNC: 28 MG/DL — HIGH (ref 7–23)
CALCIUM SERPL-MCNC: 9.8 MG/DL — SIGNIFICANT CHANGE UP (ref 8.4–10.5)
CHLORIDE SERPL-SCNC: 103 MMOL/L — SIGNIFICANT CHANGE UP (ref 96–108)
CO2 SERPL-SCNC: 21 MMOL/L — LOW (ref 22–31)
CREAT SERPL-MCNC: 1.19 MG/DL — SIGNIFICANT CHANGE UP (ref 0.5–1.3)
EOSINOPHIL # BLD AUTO: 0 K/UL — SIGNIFICANT CHANGE UP (ref 0–0.5)
EOSINOPHIL NFR BLD AUTO: 0.4 % — SIGNIFICANT CHANGE UP (ref 0–6)
GLUCOSE SERPL-MCNC: 104 MG/DL — HIGH (ref 70–99)
HCT VFR BLD CALC: 38.5 % — SIGNIFICANT CHANGE UP (ref 34.5–45)
HGB BLD-MCNC: 12.4 G/DL — SIGNIFICANT CHANGE UP (ref 11.5–15.5)
INR BLD: 1.05 RATIO — SIGNIFICANT CHANGE UP (ref 0.88–1.16)
LYMPHOCYTES # BLD AUTO: 2.2 K/UL — SIGNIFICANT CHANGE UP (ref 1–3.3)
LYMPHOCYTES # BLD AUTO: 23.1 % — SIGNIFICANT CHANGE UP (ref 13–44)
MCHC RBC-ENTMCNC: 27.8 PG — SIGNIFICANT CHANGE UP (ref 27–34)
MCHC RBC-ENTMCNC: 32.2 GM/DL — SIGNIFICANT CHANGE UP (ref 32–36)
MCV RBC AUTO: 86.4 FL — SIGNIFICANT CHANGE UP (ref 80–100)
MONOCYTES # BLD AUTO: 0.6 K/UL — SIGNIFICANT CHANGE UP (ref 0–0.9)
MONOCYTES NFR BLD AUTO: 6 % — SIGNIFICANT CHANGE UP (ref 2–14)
NEUTROPHILS # BLD AUTO: 6.7 K/UL — SIGNIFICANT CHANGE UP (ref 1.8–7.4)
NEUTROPHILS NFR BLD AUTO: 70.5 % — SIGNIFICANT CHANGE UP (ref 43–77)
PLATELET # BLD AUTO: 555 K/UL — HIGH (ref 150–400)
POTASSIUM SERPL-MCNC: 3.9 MMOL/L — SIGNIFICANT CHANGE UP (ref 3.5–5.3)
POTASSIUM SERPL-SCNC: 3.9 MMOL/L — SIGNIFICANT CHANGE UP (ref 3.5–5.3)
PROT SERPL-MCNC: 8.5 G/DL — HIGH (ref 6–8.3)
PROTHROM AB SERPL-ACNC: 12 SEC — SIGNIFICANT CHANGE UP (ref 10–12.9)
RBC # BLD: 4.46 M/UL — SIGNIFICANT CHANGE UP (ref 3.8–5.2)
RBC # FLD: 14.3 % — SIGNIFICANT CHANGE UP (ref 10.3–14.5)
RH IG SCN BLD-IMP: POSITIVE — SIGNIFICANT CHANGE UP
SODIUM SERPL-SCNC: 139 MMOL/L — SIGNIFICANT CHANGE UP (ref 135–145)
WBC # BLD: 9.4 K/UL — SIGNIFICANT CHANGE UP (ref 3.8–10.5)
WBC # FLD AUTO: 9.4 K/UL — SIGNIFICANT CHANGE UP (ref 3.8–10.5)

## 2019-08-01 PROCEDURE — 93010 ELECTROCARDIOGRAM REPORT: CPT

## 2019-08-01 PROCEDURE — 73502 X-RAY EXAM HIP UNI 2-3 VIEWS: CPT | Mod: 26,RT

## 2019-08-01 PROCEDURE — 99285 EMERGENCY DEPT VISIT HI MDM: CPT

## 2019-08-01 PROCEDURE — 73552 X-RAY EXAM OF FEMUR 2/>: CPT | Mod: 26,RT

## 2019-08-01 PROCEDURE — 71045 X-RAY EXAM CHEST 1 VIEW: CPT | Mod: 26

## 2019-08-01 RX ORDER — MORPHINE SULFATE 50 MG/1
4 CAPSULE, EXTENDED RELEASE ORAL ONCE
Refills: 0 | Status: DISCONTINUED | OUTPATIENT
Start: 2019-08-01 | End: 2019-08-01

## 2019-08-01 RX ADMIN — MORPHINE SULFATE 4 MILLIGRAM(S): 50 CAPSULE, EXTENDED RELEASE ORAL at 21:47

## 2019-08-01 NOTE — H&P ADULT - HISTORY OF PRESENT ILLNESS
78y Female presents to [facility] ED s/p [facility] fall c/o severe R hip pain and inability to ambulate s/p  in [place] while on vacation 3 days ago.  Patient denies headstrike or LOC. Localizes pain to R hip/femur. Patient denies radiation of pain. Patient denies numbness/tingling/burning in the RLE. No other bone/joint complaints. Patient is a community ambulator at baseline with/without assistive devices. Patient has no issues w/ ADLs/IADLs.     PAST MEDICAL & SURGICAL HISTORY:  UTI (urinary tract infection): 1 week ago txted by pmd  DVT (deep venous thrombosis), left: 2009 txted with coumadin x 6 months  Wrist fracture  Osteoporosis  HTN (hypertension)  Leg fracture, left: tibia - hardware insitu - 2009    MEDICATIONS  (STANDING):    MEDICATIONS  (PRN):    Allergies    iodine (Unknown)    Intolerances        T(C): 36.9 (08-01-19 @ 21:49), Max: 36.9 (08-01-19 @ 19:34)  HR: 98 (08-01-19 @ 21:49) (72 - 98)  BP: 168/94 (08-01-19 @ 21:49) (148/86 - 168/94)  RR: 20 (08-01-19 @ 21:49) (18 - 20)  SpO2: 100% (08-01-19 @ 21:49) (96% - 100%)  Wt(kg): --    PE   RLE:  Skin intact; No ecchymosis/soft tissue swelling  Compartments soft; + TTP about hip. No TTP to knee/leg/ankle/foot   ROM lmited 2/2 pain   Unable to SLR; + Log Roll/Heel Strike  Motor intact GS/TA/FHL/EHL  SILT L2-S1  DP/PT pulses 2+    LLE:  Well healed surgical incision  Motor: 5/5 EHL/FHL/TA/Gastrocnemius  Sensory: SILT DP/SP/S/S/T nerve distributions  Vascular: 2+ Dorsalis Pedis pulse      LLE/BUE:   No bony TTP; Good ROM w/o pain; Exam Unremarkable    Imaging:  XR demonstrating R femoral neck  fracture

## 2019-08-01 NOTE — ED PROVIDER NOTE - OBJECTIVE STATEMENT
78F h/o HTN, LLE DVT, osteoporosis c/b L tibial and wrist fractures p/w hip pain. Pt states she 78F h/o HTN, LLE DVT, osteoporosis c/b spinal compression fracture, L tibial, R wrist and L femur fractures p/w hip pain. Pt states she was in [place] on Tuesday and tripped on cobblestone and fell on her right hip. Pt states she was ambulating normally w/o a walker prior to falling and is now unable to ambulate. Pt denied lightheadedness/dizziness, chest pain, SOB, syncope, head trauma and LOC. Pt went to the hospital in [place] where she had an XR demonstrating R hip fracture. Pt was prescribed lovenox to take prior to her plane ride home. Pt came straight to the [facility] ED from the airport for evaluation of her R hip. 78F h/o HTN, LLE DVT, osteoporosis c/b spinal compression fracture, L tibial fracture, R wrist fracture and L femur fracture s/p THR p/w hip pain. Pt states she was in [place] on Tuesday and tripped on cobbl[place] and fell on her right hip. Pt states she was ambulating normally w/o a walker prior to falling and is now unable to ambulate. Pt denied lightheadedness/dizziness, chest pain, SOB, syncope, head trauma and LOC. Pt went to the hospital in [place] where she had an XR demonstrating R hip fracture. Pt was prescribed lovenox to take prior to her plane ride home. Pt came straight to the [facility] ED from the airport for evaluation of her R hip.

## 2019-08-01 NOTE — H&P ADULT - ASSESSMENT
78y Female with femoral neck  fracture  - Pain control  - NPO/IVF  - CBC/BMP/Coags/UA/T+S x2  - EKG/CXR  - Medical clearance  - Plan for OR with Dr. Garay in PM tomorrow

## 2019-08-01 NOTE — ED PROVIDER NOTE - CONSTITUTIONAL, MLM
normal... Well appearing, well nourished, awake, alert, oriented to person, place, time/situation and in no apparent distress. appears uncomfortable. awake, alert, oriented to person, place, time/situation and in no apparent distress.

## 2019-08-01 NOTE — ED PROVIDER NOTE - ATTENDING CONTRIBUTION TO CARE
Private Physician Rolan bennett,   78y female pmh DVT, Htn, osteoporosis, PT was visiting in [place] and fell and dx rt hip fx. Pt declined surg and flew to [facility] and came to ed from airport. Pt  has pain rt hip and unable to weight bear. PE WDWN normocephalic atraumatic neck supple chest clear anterior & posterior abd soft +bs no mass guarding. neruo no focal defect rt hip ttp mild   Juan Alberto Bobo MD, Facep

## 2019-08-01 NOTE — ED PROVIDER NOTE - CLINICAL SUMMARY MEDICAL DECISION MAKING FREE TEXT BOX
78F h/o HTN, LLE DVT, osteoporosis c/b spinal compression fracture, L tibial, R wrist and L femur fractures p/w R hip pain in the setting of recent fall on her R hip. HD normal. Exam notable for tenderness of the lateral femur and anterior hip joint.   - Pain control  - XR R hip, femur and pelvis  - Preop labs

## 2019-08-01 NOTE — ED ADULT NURSE NOTE - OBJECTIVE STATEMENT
78 y.o F presents to the ED from home c/o hip pain. Patient is awake, alert and speaking coherently. As per patient "I was on vacation in [place] and I was coming out of the [place] and I tripped on the cobble [place]/ I went to the hospital and they told me I fractured my right hip." Patient presents A&Ox3, afebrile and nonambulatory; denies fever and chills, denies chest pain and SOB; R hip tender on palpation- pain 10/10.

## 2019-08-01 NOTE — ED ADULT NURSE NOTE - DOES PATIENT HAVE ADVANCE DIRECTIVE
Woke up feeling warm-   Took to [facility], temp 99.0  Parent notes the child is acting as her usual self  Today was coughing and then vomited, none since  Parent did give the patient tylenol, patient is now napping  Advised light diet, encourage fluids, tylenol or ibuprofen for temp  Advised if fever >102, vomiting and unable to keep down liquids would need to be seen-agrees   unk

## 2019-08-02 DIAGNOSIS — S72.009A FRACTURE OF UNSPECIFIED PART OF NECK OF UNSPECIFIED FEMUR, INITIAL ENCOUNTER FOR CLOSED FRACTURE: ICD-10-CM

## 2019-08-02 LAB
ANION GAP SERPL CALC-SCNC: 12 MMOL/L — SIGNIFICANT CHANGE UP (ref 5–17)
APPEARANCE UR: CLEAR — SIGNIFICANT CHANGE UP
APTT BLD: 23.9 SEC — LOW (ref 27.5–36.3)
BACTERIA # UR AUTO: NEGATIVE — SIGNIFICANT CHANGE UP
BILIRUB UR-MCNC: NEGATIVE — SIGNIFICANT CHANGE UP
BLD GP AB SCN SERPL QL: NEGATIVE — SIGNIFICANT CHANGE UP
BUN SERPL-MCNC: 20 MG/DL — SIGNIFICANT CHANGE UP (ref 7–23)
CALCIUM SERPL-MCNC: 8.4 MG/DL — SIGNIFICANT CHANGE UP (ref 8.4–10.5)
CHLORIDE SERPL-SCNC: 106 MMOL/L — SIGNIFICANT CHANGE UP (ref 96–108)
CK MB BLD-MCNC: 2.3 % — SIGNIFICANT CHANGE UP (ref 0–3.5)
CK MB CFR SERPL CALC: 2.5 NG/ML — SIGNIFICANT CHANGE UP (ref 0–3.8)
CK SERPL-CCNC: 108 U/L — SIGNIFICANT CHANGE UP (ref 25–170)
CO2 SERPL-SCNC: 22 MMOL/L — SIGNIFICANT CHANGE UP (ref 22–31)
COLOR SPEC: SIGNIFICANT CHANGE UP
CREAT SERPL-MCNC: 1.02 MG/DL — SIGNIFICANT CHANGE UP (ref 0.5–1.3)
DIFF PNL FLD: NEGATIVE — SIGNIFICANT CHANGE UP
EPI CELLS # UR: 3 /HPF — SIGNIFICANT CHANGE UP
GLUCOSE SERPL-MCNC: 116 MG/DL — HIGH (ref 70–99)
GLUCOSE UR QL: NEGATIVE — SIGNIFICANT CHANGE UP
HCT VFR BLD CALC: 32.5 % — LOW (ref 34.5–45)
HCT VFR BLD CALC: 33.1 % — LOW (ref 34.5–45)
HGB BLD-MCNC: 10.6 G/DL — LOW (ref 11.5–15.5)
HGB BLD-MCNC: 10.7 G/DL — LOW (ref 11.5–15.5)
HYALINE CASTS # UR AUTO: 1 /LPF — SIGNIFICANT CHANGE UP (ref 0–2)
INR BLD: 1.05 RATIO — SIGNIFICANT CHANGE UP (ref 0.88–1.16)
KETONES UR-MCNC: SIGNIFICANT CHANGE UP
LEUKOCYTE ESTERASE UR-ACNC: ABNORMAL
MAGNESIUM SERPL-MCNC: 1.8 MG/DL — SIGNIFICANT CHANGE UP (ref 1.6–2.6)
MCHC RBC-ENTMCNC: 27.8 PG — SIGNIFICANT CHANGE UP (ref 27–34)
MCHC RBC-ENTMCNC: 28.2 PG — SIGNIFICANT CHANGE UP (ref 27–34)
MCHC RBC-ENTMCNC: 32.2 GM/DL — SIGNIFICANT CHANGE UP (ref 32–36)
MCHC RBC-ENTMCNC: 32.6 GM/DL — SIGNIFICANT CHANGE UP (ref 32–36)
MCV RBC AUTO: 86.2 FL — SIGNIFICANT CHANGE UP (ref 80–100)
MCV RBC AUTO: 86.4 FL — SIGNIFICANT CHANGE UP (ref 80–100)
NITRITE UR-MCNC: NEGATIVE — SIGNIFICANT CHANGE UP
PH UR: 6 — SIGNIFICANT CHANGE UP (ref 5–8)
PLATELET # BLD AUTO: 457 K/UL — HIGH (ref 150–400)
PLATELET # BLD AUTO: 473 K/UL — HIGH (ref 150–400)
POTASSIUM SERPL-MCNC: 4 MMOL/L — SIGNIFICANT CHANGE UP (ref 3.5–5.3)
POTASSIUM SERPL-SCNC: 4 MMOL/L — SIGNIFICANT CHANGE UP (ref 3.5–5.3)
PROT UR-MCNC: ABNORMAL
PROTHROM AB SERPL-ACNC: 12 SEC — SIGNIFICANT CHANGE UP (ref 10–12.9)
RBC # BLD: 3.76 M/UL — LOW (ref 3.8–5.2)
RBC # BLD: 3.84 M/UL — SIGNIFICANT CHANGE UP (ref 3.8–5.2)
RBC # FLD: 14.1 % — SIGNIFICANT CHANGE UP (ref 10.3–14.5)
RBC # FLD: 14.3 % — SIGNIFICANT CHANGE UP (ref 10.3–14.5)
RBC CASTS # UR COMP ASSIST: 1 /HPF — SIGNIFICANT CHANGE UP (ref 0–4)
RH IG SCN BLD-IMP: POSITIVE — SIGNIFICANT CHANGE UP
SODIUM SERPL-SCNC: 140 MMOL/L — SIGNIFICANT CHANGE UP (ref 135–145)
SP GR SPEC: 1.02 — SIGNIFICANT CHANGE UP (ref 1.01–1.02)
TROPONIN T, HIGH SENSITIVITY RESULT: 8 NG/L — SIGNIFICANT CHANGE UP (ref 0–51)
UROBILINOGEN FLD QL: NEGATIVE — SIGNIFICANT CHANGE UP
WBC # BLD: 7.7 K/UL — SIGNIFICANT CHANGE UP (ref 3.8–10.5)
WBC # BLD: 8.4 K/UL — SIGNIFICANT CHANGE UP (ref 3.8–10.5)
WBC # FLD AUTO: 7.7 K/UL — SIGNIFICANT CHANGE UP (ref 3.8–10.5)
WBC # FLD AUTO: 8.4 K/UL — SIGNIFICANT CHANGE UP (ref 3.8–10.5)
WBC UR QL: 7 /HPF — HIGH (ref 0–5)

## 2019-08-02 PROCEDURE — 99254 IP/OBS CNSLTJ NEW/EST MOD 60: CPT

## 2019-08-02 PROCEDURE — 73523 X-RAY EXAM HIPS BI 5/> VIEWS: CPT | Mod: 26

## 2019-08-02 PROCEDURE — 93306 TTE W/DOPPLER COMPLETE: CPT | Mod: 26

## 2019-08-02 PROCEDURE — 93010 ELECTROCARDIOGRAM REPORT: CPT

## 2019-08-02 RX ORDER — AMLODIPINE BESYLATE 2.5 MG/1
5 TABLET ORAL ONCE
Refills: 0 | Status: COMPLETED | OUTPATIENT
Start: 2019-08-02 | End: 2019-08-02

## 2019-08-02 RX ORDER — OXYCODONE HYDROCHLORIDE 5 MG/1
2.5 TABLET ORAL EVERY 4 HOURS
Refills: 0 | Status: DISCONTINUED | OUTPATIENT
Start: 2019-08-02 | End: 2019-08-02

## 2019-08-02 RX ORDER — SENNA PLUS 8.6 MG/1
2 TABLET ORAL AT BEDTIME
Refills: 0 | Status: DISCONTINUED | OUTPATIENT
Start: 2019-08-02 | End: 2019-08-02

## 2019-08-02 RX ORDER — DOCUSATE SODIUM 100 MG
100 CAPSULE ORAL THREE TIMES A DAY
Refills: 0 | Status: DISCONTINUED | OUTPATIENT
Start: 2019-08-02 | End: 2019-08-02

## 2019-08-02 RX ORDER — SODIUM CHLORIDE 9 MG/ML
1000 INJECTION, SOLUTION INTRAVENOUS
Refills: 0 | Status: DISCONTINUED | OUTPATIENT
Start: 2019-08-02 | End: 2019-08-02

## 2019-08-02 RX ORDER — ACETAMINOPHEN 500 MG
975 TABLET ORAL EVERY 8 HOURS
Refills: 0 | Status: DISCONTINUED | OUTPATIENT
Start: 2019-08-02 | End: 2019-08-02

## 2019-08-02 RX ORDER — FAMOTIDINE 10 MG/ML
20 INJECTION INTRAVENOUS
Refills: 0 | Status: DISCONTINUED | OUTPATIENT
Start: 2019-08-02 | End: 2019-08-06

## 2019-08-02 RX ORDER — ACETAMINOPHEN 500 MG
1000 TABLET ORAL ONCE
Refills: 0 | Status: COMPLETED | OUTPATIENT
Start: 2019-08-02 | End: 2019-08-02

## 2019-08-02 RX ORDER — VALSARTAN 80 MG/1
80 TABLET ORAL DAILY
Refills: 0 | Status: DISCONTINUED | OUTPATIENT
Start: 2019-08-02 | End: 2019-08-06

## 2019-08-02 RX ORDER — OXYCODONE HYDROCHLORIDE 5 MG/1
5 TABLET ORAL EVERY 6 HOURS
Refills: 0 | Status: DISCONTINUED | OUTPATIENT
Start: 2019-08-02 | End: 2019-08-06

## 2019-08-02 RX ORDER — HEPARIN SODIUM 5000 [USP'U]/ML
5000 INJECTION INTRAVENOUS; SUBCUTANEOUS EVERY 8 HOURS
Refills: 0 | Status: COMPLETED | OUTPATIENT
Start: 2019-08-02 | End: 2019-08-02

## 2019-08-02 RX ORDER — ACETAMINOPHEN 500 MG
975 TABLET ORAL EVERY 8 HOURS
Refills: 0 | Status: DISCONTINUED | OUTPATIENT
Start: 2019-08-02 | End: 2019-08-06

## 2019-08-02 RX ORDER — ONDANSETRON 8 MG/1
4 TABLET, FILM COATED ORAL EVERY 6 HOURS
Refills: 0 | Status: DISCONTINUED | OUTPATIENT
Start: 2019-08-02 | End: 2019-08-06

## 2019-08-02 RX ORDER — SODIUM CHLORIDE 9 MG/ML
1000 INJECTION, SOLUTION INTRAVENOUS
Refills: 0 | Status: DISCONTINUED | OUTPATIENT
Start: 2019-08-02 | End: 2019-08-03

## 2019-08-02 RX ORDER — OXYCODONE HYDROCHLORIDE 5 MG/1
5 TABLET ORAL EVERY 4 HOURS
Refills: 0 | Status: DISCONTINUED | OUTPATIENT
Start: 2019-08-02 | End: 2019-08-02

## 2019-08-02 RX ORDER — TRAMADOL HYDROCHLORIDE 50 MG/1
25 TABLET ORAL EVERY 4 HOURS
Refills: 0 | Status: DISCONTINUED | OUTPATIENT
Start: 2019-08-02 | End: 2019-08-06

## 2019-08-02 RX ORDER — LOSARTAN POTASSIUM 100 MG/1
50 TABLET, FILM COATED ORAL DAILY
Refills: 0 | Status: DISCONTINUED | OUTPATIENT
Start: 2019-08-02 | End: 2019-08-02

## 2019-08-02 RX ORDER — OXYCODONE HYDROCHLORIDE 5 MG/1
2.5 TABLET ORAL EVERY 4 HOURS
Refills: 0 | Status: DISCONTINUED | OUTPATIENT
Start: 2019-08-02 | End: 2019-08-06

## 2019-08-02 RX ORDER — DEXTROSE MONOHYDRATE, SODIUM CHLORIDE, AND POTASSIUM CHLORIDE 50; .745; 4.5 G/1000ML; G/1000ML; G/1000ML
1000 INJECTION, SOLUTION INTRAVENOUS
Refills: 0 | Status: DISCONTINUED | OUTPATIENT
Start: 2019-08-02 | End: 2019-08-02

## 2019-08-02 RX ADMIN — Medication 1000 MILLIGRAM(S): at 21:50

## 2019-08-02 RX ADMIN — Medication 400 MILLIGRAM(S): at 21:20

## 2019-08-02 RX ADMIN — Medication 975 MILLIGRAM(S): at 06:05

## 2019-08-02 RX ADMIN — DEXTROSE MONOHYDRATE, SODIUM CHLORIDE, AND POTASSIUM CHLORIDE 100 MILLILITER(S): 50; .745; 4.5 INJECTION, SOLUTION INTRAVENOUS at 00:47

## 2019-08-02 RX ADMIN — HEPARIN SODIUM 5000 UNIT(S): 5000 INJECTION INTRAVENOUS; SUBCUTANEOUS at 21:20

## 2019-08-02 RX ADMIN — FAMOTIDINE 20 MILLIGRAM(S): 10 INJECTION INTRAVENOUS at 21:41

## 2019-08-02 RX ADMIN — AMLODIPINE BESYLATE 5 MILLIGRAM(S): 2.5 TABLET ORAL at 09:18

## 2019-08-02 RX ADMIN — DEXTROSE MONOHYDRATE, SODIUM CHLORIDE, AND POTASSIUM CHLORIDE 100 MILLILITER(S): 50; .745; 4.5 INJECTION, SOLUTION INTRAVENOUS at 05:35

## 2019-08-02 RX ADMIN — Medication 400 MILLIGRAM(S): at 13:51

## 2019-08-02 RX ADMIN — OXYCODONE HYDROCHLORIDE 5 MILLIGRAM(S): 5 TABLET ORAL at 01:54

## 2019-08-02 RX ADMIN — DEXTROSE MONOHYDRATE, SODIUM CHLORIDE, AND POTASSIUM CHLORIDE 100 MILLILITER(S): 50; .745; 4.5 INJECTION, SOLUTION INTRAVENOUS at 12:58

## 2019-08-02 RX ADMIN — OXYCODONE HYDROCHLORIDE 5 MILLIGRAM(S): 5 TABLET ORAL at 02:24

## 2019-08-02 RX ADMIN — Medication 975 MILLIGRAM(S): at 05:35

## 2019-08-02 NOTE — CONSULT NOTE ADULT - SUBJECTIVE AND OBJECTIVE BOX
The patient was seen and examined today after an accidental fall with a right femoral neck fracture that requires orthopedic ORIF. Her comorbidities include controlled hypertension, osteoporosis, varicose veins, distant history of DVT 2009 and advanced age. Exam, lab, EKG and CXR are stable. The patient is medically stable, medically optimized and has no medical contraindication to surgery later today, as required. Exam time 70 minutes including > than 50 % for bedside discussion and counseling. Comprehensive consultation dictated #[id number]. The patient was seen and examined today after an accidental fall with a right femoral neck fracture that requires orthopedic ORIF. Her comorbidities include controlled hypertension, osteoporosis, varicose veins, distant history of DVT 2009 and advanced age. Exam, lab, EKG and CXR are stable. The patient is medically stable, medically optimized and has no medical contraindication to surgery later today, as required. Exam time 70 minutes including > than 50 % for bedside discussion and counseling. Comprehensive consultation dictated #[id number].    ONSULTING SERVICE:  Internal Medicine.    REASON FOR CONSULTATION:  The patient is having an Internal Medicine consultation prior to surgery for her right hip fracture.    HISTORY OF PRESENT ILLNESS:  The patient is a 78-year-old female who was in good health on vacation in [place].  She was walking on [address] and tripped and lost her balance and landed on her right hip.  She was told the fracture and within three days she has been able to return home to [place].  The x-rays have confirmed a right femoral neck fracture that requires ORIF.  The patient is in pain secondary to the fracture, but otherwise she has no significant complaints.    MEDICATIONS:  Her past medications that she has been given by her primary care physician, Diovan 160 mg a day, Norvasc 5 mg once a day, folic acid 1 mg daily, calcium 1000 mg daily, vitamin D 1000 IU daily, baby aspirin 81 mg twice a day.    ALLERGIES:  SHE HAS ALLERGIES TO IODINE.  SHE ALSO TAKES PROLIA AND MAY HAVE AN ALLERGY TO PROLIA.    SOCIAL HISTORY:  She is a nonsmoker and nondrinker with no drug history.  She works as a  and lives with her .    PAST MEDICAL HISTORY:  Includes osteoporosis and hypertension.  She also has suffers with varicose veins.  She had a history of deep venous thrombosis in 2009 treated with six months of Coumadin therapy.  The patient is on a regular diet.  She weighs 120 pounds.    PAST SURGICAL HISTORY:  Positive for left tibial fracture in 2009.  She had a right wrist fracture in 2011 and in 2016 she had a left hip fracture which was pinned by Dr. Garay.  FAMILY HISTORY:  She has a family history of mother had breast carcinoma.  Her father had arteriosclerotic heart disease.    REVIEW OF SYSTEMS:  Essentially normal.  She has no headaches or dizziness.  No changes in hearing or vision.  No sinusitis or dental disease.  No difficulty swallowing.  She has no shortness of breath, cough, congestion or wheezing.  She has no cardiac history of chest pain, palpitations or arrhythmias.  She has no history of heart murmur.  She has no syncopal episodes.  She has no abdominal pain, change in bowel habits or GI bleeding.  She has no dysuria, frequency or incontinence.  She has no signs of urinary or bowel disease.  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  Her osteoporosis affects her where she falls because she has had numerous fractures.  She has no rashes or skin disease.  She has no neurological complaints.    PHYSICAL EXAMINATION:  VITAL SIGNS:  At this time, shows a blood pressure of 120/70, pulse of 64, respirations 16.  GENERAL:  She is afebrile.  HEAD AND NECK:  Examination is normal.  CHEST:  Clear with good breath sounds bilaterally.  CARDIAC:  Shows a 2/6 holosystolic murmur at the left lower sternal border.  ABDOMEN:  Soft with no masses, tenderness, guarding or rebound.  EXTREMITIES:  She has 4+ proximal hip swelling with ecchymosis related to her fall.  She is awaiting Doppler study to rule out preoperative DVT.  NEUROLOGIC:  She has no significant osteoarthritis and she has no focal neurological deficits.    LABORATORIES OBTAINED:  CBC hemoglobin is 10.7, hematocrit 33.1, white count is 7.7, platelet count is 473,000.  INR is 1.05.  PTT is 23.9.  Sodium is 140, potassium 4.0, chloride 106, CO2 is 22, BUN is 20, creatinine is 1.02, blood sugar is 116..  Urine shows occasional WBCs.  Culture obtained.    RADIOLOGY:  X-ray performed shows status post left hemiarthroplasty and a right impacted femoral neck fracture which is acute.          IMPRESSION:  The impression at this time is the patient has a right femoral neck fracture after an accidental fall.  She is a good candidate for surgical open reduction and internal fixation as scheduled for later today.  The patient will have a preoperative venous Doppler study to rule out occult deep vein thrombosis with the past history, but takes no anticoagulants except for baby aspirin twice a day.  The patient appears to be in normal health with no blood pressure abnormalities and is stable.  EKG was performed and shows normal sinus rhythm with the heart rate of 78.  She has had a right bundle branch block with left axis deviation and nonspecific ST-T waves.  Chest x-ray is clear.  The impression at this time is that the patient has an acute right femoral neck fracture, controlled hypertension, right bundle branch block and osteoporosis.  She has no medical contraindication to surgery as this is required for later today.  The patient will continue to have medical management postoperatively to lessen the risk of complications.        _______________________    DICT:	TATIANA HAN MD ([id number]) 08/02/2019 01:43 PM  TRANS:	[id number] 08/02/2019 01:54 PM  JOB:	[id number]    Electronically Signed by:  TATIANA HAN 08/02/2019 07:58:09 PM The patient was seen and examined today after an accidental fall with a right femoral neck fracture that requires orthopedic ORIF. Her comorbidities include controlled hypertension, osteoporosis, varicose veins, distant history of DVT 2009 and advanced age. Exam, lab, EKG and CXR are stable. The patient is medically stable, medically optimized and has no medical contraindication to surgery later today, as required. Exam time 70 minutes including > than 50 % for bedside discussion and counseling. Comprehensive consultation dictated #[id number].    CONSULTING SERVICE:  Internal Medicine.    REASON FOR CONSULTATION:  The patient is having an Internal Medicine consultation prior to surgery for her right hip fracture.    HISTORY OF PRESENT ILLNESS:  The patient is a 78-year-old female who was in good health on vacation in [place].  She was walking on [address] and tripped and lost her balance and landed on her right hip.  She was told the fracture and within three days she has been able to return home to [place].  The x-rays have confirmed a right femoral neck fracture that requires ORIF.  The patient is in pain secondary to the fracture, but otherwise she has no significant complaints.    MEDICATIONS:  Her past medications that she has been given by her primary care physician, Diovan 160 mg a day, Norvasc 5 mg once a day, folic acid 1 mg daily, calcium 1000 mg daily, vitamin D 1000 IU daily, baby aspirin 81 mg twice a day.    ALLERGIES:  SHE HAS ALLERGIES TO IODINE.  SHE ALSO TAKES PROLIA AND MAY HAVE AN ALLERGY TO PROLIA.    SOCIAL HISTORY:  She is a nonsmoker and nondrinker with no drug history.  She works as a  and lives with her .    PAST MEDICAL HISTORY:  Includes osteoporosis and hypertension.  She also has suffers with varicose veins.  She had a history of deep venous thrombosis in 2009 treated with six months of Coumadin therapy.  The patient is on a regular diet.  She weighs 120 pounds.    PAST SURGICAL HISTORY:  Positive for left tibial fracture in 2009.  She had a right wrist fracture in 2011 and in 2016 she had a left hip fracture which was pinned by Dr. Garay.  FAMILY HISTORY:  She has a family history of mother had breast carcinoma.  Her father had arteriosclerotic heart disease.    REVIEW OF SYSTEMS:  Essentially normal.  She has no headaches or dizziness.  No changes in hearing or vision.  No sinusitis or dental disease.  No difficulty swallowing.  She has no shortness of breath, cough, congestion or wheezing.  She has no cardiac history of chest pain, palpitations or arrhythmias.  She has no history of heart murmur.  She has no syncopal episodes.  She has no abdominal pain, change in bowel habits or GI bleeding.  She has no dysuria, frequency or incontinence.  She has no signs of urinary or bowel disease.  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  Her osteoporosis affects her where she falls because she has had numerous fractures.  She has no rashes or skin disease.  She has no neurological complaints.    PHYSICAL EXAMINATION:  VITAL SIGNS:  At this time, shows a blood pressure of 120/70, pulse of 64, respirations 16.  GENERAL:  She is afebrile.  HEAD AND NECK:  Examination is normal.  CHEST:  Clear with good breath sounds bilaterally.  CARDIAC:  Shows a 2/6 holosystolic murmur at the left lower sternal border.  ABDOMEN:  Soft with no masses, tenderness, guarding or rebound.  EXTREMITIES:  She has 4+ proximal hip swelling with ecchymosis related to her fall.  She is awaiting Doppler study to rule out preoperative DVT.  NEUROLOGIC:  She has no significant osteoarthritis and she has no focal neurological deficits.    LABORATORIES OBTAINED:  CBC hemoglobin is 10.7, hematocrit 33.1, white count is 7.7, platelet count is 473,000.  INR is 1.05.  PTT is 23.9.  Sodium is 140, potassium 4.0, chloride 106, CO2 is 22, BUN is 20, creatinine is 1.02, blood sugar is 116..  Urine shows occasional WBCs.  Culture obtained.    RADIOLOGY:  X-ray performed shows status post left hemiarthroplasty and a right impacted femoral neck fracture which is acute.          IMPRESSION:  The impression at this time is the patient has a right femoral neck fracture after an accidental fall.  She is a good candidate for surgical open reduction and internal fixation as scheduled for later today.  The patient will have a preoperative venous Doppler study to rule out occult deep vein thrombosis with the past history, but takes no anticoagulants except for baby aspirin twice a day.  The patient appears to be in normal health with no blood pressure abnormalities and is stable.  EKG was performed and shows normal sinus rhythm with the heart rate of 78.  She has had a right bundle branch block with left axis deviation and nonspecific ST-T waves.  Chest x-ray is clear.  The impression at this time is that the patient has an acute right femoral neck fracture, controlled hypertension, right bundle branch block and osteoporosis.  She has no medical contraindication to surgery as this is required for later today.  The patient will continue to have medical management postoperatively to lessen the risk of complications.        _______________________    DICT:	TATIANA HAN MD ([id number]) 08/02/2019 01:43 PM  TRANS:	[id number] 08/02/2019 01:54 PM  JOB:	[id number]    Electronically Signed by:  TATIANA HAN 08/02/2019 07:58:09 PM

## 2019-08-02 NOTE — PROGRESS NOTE ADULT - SUBJECTIVE AND OBJECTIVE BOX
Patient seen and examined.  Pain well controlled.    Vital Signs Last 24 Hrs  T(C): 36.7 (02 Aug 2019 04:44), Max: 37.1 (02 Aug 2019 01:18)  T(F): 98 (02 Aug 2019 04:44), Max: 98.7 (02 Aug 2019 01:18)  HR: 87 (02 Aug 2019 04:44) (72 - 98)  BP: 132/71 (02 Aug 2019 04:44) (130/76 - 168/94)  BP(mean): --  RR: 16 (02 Aug 2019 04:44) (16 - 20)  SpO2: 97% (02 Aug 2019 04:44) (96% - 100%)    Gen: NAD  RLE:   EHL/FHL/TA/GSC Intact  SILT DP/SP/Maria/Sa/T  WWP Distally  Skin intact                          10.7   7.7   )-----------( 473      ( 02 Aug 2019 04:46 )             33.1                         12.4   9.4   )-----------( 555      ( 01 Aug 2019 22:00 )             38.5       08-02    140  |  106  |  20  ----------------------------<  116<H>  4.0   |  22  |  1.02        PT/INR - ( 02 Aug 2019 04:46 )   PT: 12.0 sec;   INR: 1.05 ratio         PTT - ( 02 Aug 2019 04:46 )  PTT:23.9 sec    A/P 78 year old female with right femoral neck fracture  Pain Contorl  NWB RLE  Bedrest  NPO/IVF  Hold DVT PPx  Plan for OR today with .

## 2019-08-03 LAB
ANION GAP SERPL CALC-SCNC: 10 MMOL/L — SIGNIFICANT CHANGE UP (ref 5–17)
APTT BLD: 23.9 SEC — LOW (ref 27.5–36.3)
BLD GP AB SCN SERPL QL: NEGATIVE — SIGNIFICANT CHANGE UP
BUN SERPL-MCNC: 15 MG/DL — SIGNIFICANT CHANGE UP (ref 7–23)
CALCIUM SERPL-MCNC: 8.5 MG/DL — SIGNIFICANT CHANGE UP (ref 8.4–10.5)
CHLORIDE SERPL-SCNC: 110 MMOL/L — HIGH (ref 96–108)
CO2 SERPL-SCNC: 20 MMOL/L — LOW (ref 22–31)
CREAT SERPL-MCNC: 0.88 MG/DL — SIGNIFICANT CHANGE UP (ref 0.5–1.3)
GLUCOSE SERPL-MCNC: 88 MG/DL — SIGNIFICANT CHANGE UP (ref 70–99)
HCT VFR BLD CALC: 30.7 % — LOW (ref 34.5–45)
HGB BLD-MCNC: 10.2 G/DL — LOW (ref 11.5–15.5)
INR BLD: 0.98 RATIO — SIGNIFICANT CHANGE UP (ref 0.88–1.16)
MAGNESIUM SERPL-MCNC: 1.8 MG/DL — SIGNIFICANT CHANGE UP (ref 1.6–2.6)
MCHC RBC-ENTMCNC: 28.7 PG — SIGNIFICANT CHANGE UP (ref 27–34)
MCHC RBC-ENTMCNC: 33.2 GM/DL — SIGNIFICANT CHANGE UP (ref 32–36)
MCV RBC AUTO: 86.5 FL — SIGNIFICANT CHANGE UP (ref 80–100)
PHOSPHATE SERPL-MCNC: 2.9 MG/DL — SIGNIFICANT CHANGE UP (ref 2.5–4.5)
PLATELET # BLD AUTO: 421 K/UL — HIGH (ref 150–400)
POTASSIUM SERPL-MCNC: 4 MMOL/L — SIGNIFICANT CHANGE UP (ref 3.5–5.3)
POTASSIUM SERPL-SCNC: 4 MMOL/L — SIGNIFICANT CHANGE UP (ref 3.5–5.3)
PROTHROM AB SERPL-ACNC: 11.3 SEC — SIGNIFICANT CHANGE UP (ref 10–12.9)
RBC # BLD: 3.55 M/UL — LOW (ref 3.8–5.2)
RBC # FLD: 13.9 % — SIGNIFICANT CHANGE UP (ref 10.3–14.5)
RH IG SCN BLD-IMP: POSITIVE — SIGNIFICANT CHANGE UP
SODIUM SERPL-SCNC: 140 MMOL/L — SIGNIFICANT CHANGE UP (ref 135–145)
TROPONIN T, HIGH SENSITIVITY RESULT: 10 NG/L — SIGNIFICANT CHANGE UP (ref 0–51)
WBC # BLD: 6.9 K/UL — SIGNIFICANT CHANGE UP (ref 3.8–10.5)
WBC # FLD AUTO: 6.9 K/UL — SIGNIFICANT CHANGE UP (ref 3.8–10.5)

## 2019-08-03 PROCEDURE — 99223 1ST HOSP IP/OBS HIGH 75: CPT

## 2019-08-03 PROCEDURE — 93970 EXTREMITY STUDY: CPT | Mod: 26

## 2019-08-03 PROCEDURE — 93010 ELECTROCARDIOGRAM REPORT: CPT

## 2019-08-03 PROCEDURE — 71275 CT ANGIOGRAPHY CHEST: CPT | Mod: 26

## 2019-08-03 RX ORDER — HEPARIN SODIUM 5000 [USP'U]/ML
900 INJECTION INTRAVENOUS; SUBCUTANEOUS
Qty: 25000 | Refills: 0 | Status: DISCONTINUED | OUTPATIENT
Start: 2019-08-03 | End: 2019-08-04

## 2019-08-03 RX ORDER — SODIUM CHLORIDE 9 MG/ML
1000 INJECTION INTRAMUSCULAR; INTRAVENOUS; SUBCUTANEOUS
Refills: 0 | Status: DISCONTINUED | OUTPATIENT
Start: 2019-08-04 | End: 2019-08-06

## 2019-08-03 RX ORDER — HEPARIN SODIUM 5000 [USP'U]/ML
4000 INJECTION INTRAVENOUS; SUBCUTANEOUS ONCE
Refills: 0 | Status: COMPLETED | OUTPATIENT
Start: 2019-08-03 | End: 2019-08-03

## 2019-08-03 RX ORDER — DIPHENHYDRAMINE HCL 50 MG
50 CAPSULE ORAL ONCE
Refills: 0 | Status: COMPLETED | OUTPATIENT
Start: 2019-08-03 | End: 2019-08-03

## 2019-08-03 RX ORDER — SODIUM CHLORIDE 9 MG/ML
1000 INJECTION INTRAMUSCULAR; INTRAVENOUS; SUBCUTANEOUS
Refills: 0 | Status: DISCONTINUED | OUTPATIENT
Start: 2019-08-03 | End: 2019-08-03

## 2019-08-03 RX ADMIN — HEPARIN SODIUM 4000 UNIT(S): 5000 INJECTION INTRAVENOUS; SUBCUTANEOUS at 19:47

## 2019-08-03 RX ADMIN — Medication 975 MILLIGRAM(S): at 05:32

## 2019-08-03 RX ADMIN — OXYCODONE HYDROCHLORIDE 5 MILLIGRAM(S): 5 TABLET ORAL at 00:57

## 2019-08-03 RX ADMIN — OXYCODONE HYDROCHLORIDE 2.5 MILLIGRAM(S): 5 TABLET ORAL at 09:46

## 2019-08-03 RX ADMIN — Medication 975 MILLIGRAM(S): at 05:35

## 2019-08-03 RX ADMIN — Medication 975 MILLIGRAM(S): at 13:37

## 2019-08-03 RX ADMIN — Medication 50 MILLIGRAM(S): at 12:07

## 2019-08-03 RX ADMIN — VALSARTAN 80 MILLIGRAM(S): 80 TABLET ORAL at 05:31

## 2019-08-03 RX ADMIN — Medication 975 MILLIGRAM(S): at 21:45

## 2019-08-03 RX ADMIN — OXYCODONE HYDROCHLORIDE 2.5 MILLIGRAM(S): 5 TABLET ORAL at 10:15

## 2019-08-03 RX ADMIN — Medication 975 MILLIGRAM(S): at 14:00

## 2019-08-03 RX ADMIN — HEPARIN SODIUM 9 UNIT(S)/HR: 5000 INJECTION INTRAVENOUS; SUBCUTANEOUS at 19:46

## 2019-08-03 RX ADMIN — FAMOTIDINE 20 MILLIGRAM(S): 10 INJECTION INTRAVENOUS at 05:31

## 2019-08-03 RX ADMIN — OXYCODONE HYDROCHLORIDE 5 MILLIGRAM(S): 5 TABLET ORAL at 02:30

## 2019-08-03 RX ADMIN — FAMOTIDINE 20 MILLIGRAM(S): 10 INJECTION INTRAVENOUS at 17:45

## 2019-08-03 RX ADMIN — Medication 975 MILLIGRAM(S): at 21:44

## 2019-08-03 RX ADMIN — Medication 45 MILLIGRAM(S): at 10:00

## 2019-08-03 NOTE — PROGRESS NOTE ADULT - SUBJECTIVE AND OBJECTIVE BOX
Patient is a 78y old  Female who presents with a chief complaint of Left femoral neck fracture.         MEDICATIONS  (STANDING):  acetaminophen   Tablet .. 975 milliGRAM(s) Oral every 8 hours  famotidine    Tablet 20 milliGRAM(s) Oral two times a day  lactated ringers. 1000 milliLiter(s) (85 mL/Hr) IV Continuous <Continuous>  valsartan 80 milliGRAM(s) Oral daily    MEDICATIONS  (PRN):  ondansetron Injectable 4 milliGRAM(s) IV Push every 6 hours PRN Nausea and/or Vomiting  oxyCODONE    IR 5 milliGRAM(s) Oral every 6 hours PRN Severe Pain (7 - 10)  oxyCODONE    IR 2.5 milliGRAM(s) Oral every 4 hours PRN Moderate Pain (4 - 6)  traMADol 25 milliGRAM(s) Oral every 4 hours PRN Mild Pain (1 - 3)      Allergies    iodine (Unknown)    Intolerances      REVIEW OF SYSTEMS:  CONSTITUTIONAL: No fever, No change in weight, No fatigue  HEAD: No headache, No dizziness, No recent trauma  EYES: No eye pain, No visual disturbances, No discharge  ENT:  No difficulty hearing, No tinnitus, No vertigo, No sinus pain, No throat pain  NECK: No pain, No stiffness  BREASTS: No pain, No masses, No nipple discharge  RESPIRATORY: No cough, No wheezing, No chills, No hemoptysis, No shortness of breath at rest or exertional shortness of breath  CARDIOVASCULAR: No chest pain, No palpitations, No dizziness, No CHF, No arrhythmia, No cardiomegaly, No leg swelling  GASTROINTESTINAL: No abdominal, No epigastric pain. No nausea, No vomiting, No hematemesis, No diarrhea, No constipation. No melena, No hematochezia, No GERD  GENITOURINARY: No dysuria, No frequency, No hematuria, No incontinence, No nocturia, No hesitancy  SKIN: No itching, No burning, No rashes, No lesions   LYMPH NODES: No history of enlarged glands  ENDOCRINE: No heat or cold intolerance, No hair loss. No osteoporosis, No thyroid disease  MUSCULOSKELETAL: No joint pain or swelling, No muscle, back, or extremity pain  PSYCHIATRIC: No depression, No anxiety, No mood swings, No difficulty sleeping  HEME/LYMPH: No easy bruising, No anticoagulants, No bleeding disorder, No bleeding gums  ALLERGY AND IMMUNOLOGIC: No hives, No eczema  NEUROLOGICAL: No memory loss, No loss of strength, No numbness, No tremors  VITALS:   T(C): 36.4 (19 @ 13:36), Max: 36.9 (19 @ 00:58)  HR: 89 (19 @ 13:36) (81 - 102)  BP: 143/80 (19 @ 13:36) (100/66 - 143/80)  RR: 18 (19 @ 13:36) (14 - 18)  SpO2: 96% (19 @ 13:36) (94% - 100%)  Wt(kg): --    PHYSICAL EXAM:  GENERAL: NAD, well nourished and conversant  HEAD:  Atraumatic  EYES: EOM, PERRLA, conjunctiva pink and sclera white  ENT: No tonsillar erythema, exudates, or enlargement, moist mucous membranes, good dentition, no lesions  NECK: Supple, No JVD, normal thyroid, carotids with normal upstrokes and no bruits  CHEST/LUNG: Clear to auscultation bilaterally, No rales, rhonchi, wheezing, or rubs  HEART: Regular rate and rhythm, No murmurs, rubs, or gallops  ABDOMEN: Soft, nondistended, no masses, guarding, tenderness or rebound, bowel sounds present  EXTREMITIES:  2+ Peripheral Pulses, No clubbing, cyanosis, or edema. No arthritis of shoulders, elbows, hands, hips, knees, ankles, or feet. No DJD C spine, T spine, or L/S spine  LYMPH: No lymphadenopathy noted  SKIN: No rashes or lesions  NERVOUS SYSTEM:  Alert & Oriented X3, normal cognitive function. Motor Strength 5/5 right upper and right lower.  5/5 left upper and left lower extremities, DTRs 2+ intact and symmetric    LABS:    CARDIAC MARKERS ( 02 Aug 2019 17:53 )  x     / x     / 108 U/L / x     / 2.5 ng/mL      CBC Full  -  ( 03 Aug 2019 04:28 )  WBC Count : 6.9 K/uL  RBC Count : 3.55 M/uL  Hemoglobin : 10.2 g/dL  Hematocrit : 30.7 %  Platelet Count - Automated : 421 K/uL  Mean Cell Volume : 86.5 fl  Mean Cell Hemoglobin : 28.7 pg  Mean Cell Hemoglobin Concentration : 33.2 gm/dL  Auto Neutrophil # : x  Auto Lymphocyte # : x  Auto Monocyte # : x  Auto Eosinophil # : x  Auto Basophil # : x  Auto Neutrophil % : x  Auto Lymphocyte % : x  Auto Monocyte % : x  Auto Eosinophil % : x  Auto Basophil % : x    08-03    140  |  110<H>  |  15  ----------------------------<  88  4.0   |  20<L>  |  0.88    Ca    8.5      03 Aug 2019 04:28  Phos  2.9     08-  Mg     1.8     -    TPro  8.5<H>  /  Alb  4.0  /  TBili  0.6  /  DBili  x   /  AST  19  /  ALT  16  /  AlkPhos  68  08-01    LIVER FUNCTIONS - ( 01 Aug 2019 22:00 )  Alb: 4.0 g/dL / Pro: 8.5 g/dL / ALK PHOS: 68 U/L / ALT: 16 U/L / AST: 19 U/L / GGT: x           PT/INR - ( 03 Aug 2019 04:28 )   PT: 11.3 sec;   INR: 0.98 ratio         PTT - ( 03 Aug 2019 04:28 )  PTT:23.9 sec  Urinalysis Basic - ( 02 Aug 2019 02:40 )    Color: Light Yellow / Appearance: Clear / S.017 / pH: x  Gluc: x / Ketone: Trace  / Bili: Negative / Urobili: Negative   Blood: x / Protein: 30 mg/dL / Nitrite: Negative   Leuk Esterase: Moderate / RBC: 1 /hpf / WBC 7 /HPF   Sq Epi: x / Non Sq Epi: 3 /hpf / Bacteria: Negative      CAPILLARY BLOOD GLUCOSE          RADIOLOGY & ADDITIONAL TESTS:      Consultant(s):    Care Discussed with Consultants/Other Providers [ ] YES  [ ] NO Patient is a 78y old  Female who presents with a chief complaint of Left femoral neck fracture.  The patient  was in good health on vacation in [place].  She was walking on [address] and tripped and lost her balance and landed on her right hip.  She was told of the fracture and within three days, she has been able to return home to [place].  The x-rays have confirmed a right femoral neck fracture that requires ORIF.  The patient is in pain secondary to the fracture, but otherwise she had no significant complaints. The patient  went to the operating room  for surgical repair of her hip-fracture 2019 and the procedure  was  aborted.  Prior to induction on cardiac monitoring, she was found to have a 6 beat run of wide-complex tachycardia.  She also had frequent VPCs.  The patient was seen by cardiology that evening, and cardiac enzymes were obtained and negative.  The patient was sent for a Doppler study of the leg which showed no evidence for DVT bilaterally.  She then had a CT angiogram of the lungs which shows positive pulmonary emboli in the right upper and right lower lobes.  It is likely that the thrombus traveled to the lung, with movement prior to surgery and caused hypoxemia with sudden onset of cardiac arrhythmia.  Cardiology to advise if  an arrhythmic agent should be started.  The patient continues on telemetry  She has had no further beats of ventricular tachycardia.  The patient still requires surgical intervention for her fractured hip, prior to full dose anti-coagulation      MEDICATIONS  (STANDING):  acetaminophen   Tablet .. 975 milliGRAM(s) Oral every 8 hours  famotidine    Tablet 20 milliGRAM(s) Oral two times a day  lactated ringers. 1000 milliLiter(s) (85 mL/Hr) IV Continuous <Continuous>  valsartan 80 milliGRAM(s) Oral daily    MEDICATIONS  (PRN):  ondansetron Injectable 4 milliGRAM(s) IV Push every 6 hours PRN Nausea and/or Vomiting  oxyCODONE    IR 5 milliGRAM(s) Oral every 6 hours PRN Severe Pain (7 - 10)  oxyCODONE    IR 2.5 milliGRAM(s) Oral every 4 hours PRN Moderate Pain (4 - 6)  traMADol 25 milliGRAM(s) Oral every 4 hours PRN Mild Pain (1 - 3)        Allergies    iodine (Unknown)    ALLERGY AND IMMUNOLOGIC: No hives, No eczema  NEUROLOGICAL: No memory loss, No loss of strength, No numbness, No tremors  VITALS:   T(C): 36.4 (19 @ 13:36), Max: 36.9 (19 @ 00:58)  HR: 89 (19 @ 13:36) (81 - 102)  BP: 143/80 (19 @ 13:36) (100/66 - 143/80)  RR: 18 (19 @ 13:36) (14 - 18)  SpO2: 96% (19 @ 13:36) (94% - 100%)  Wt(kg): --    PHYSICAL EXAM:  GENERAL: NAD, well nourished and conversant  HEAD:  Atraumatic  EYES: EOM, PERRLA, conjunctiva pink and sclera white  ENT: No tonsillar erythema, exudates, or enlargement, moist mucous membranes, good dentition, no lesions  NECK: Supple, No JVD, normal thyroid, carotids with normal upstrokes and no bruits  CHEST/LUNG: Clear to auscultation bilaterally, No rales, rhonchi, wheezing, or rubs  HEART: Regular rate and rhythm, No murmurs, rubs, or gallops  ABDOMEN: Soft, nondistended, no masses, guarding, tenderness or rebound, bowel sounds present  EXTREMITIES:  2+ Peripheral Pulses, No clubbing, cyanosis, or edema. No arthritis of shoulders, elbows, hands, hips, knees, ankles, or feet. No DJD C spine, T spine, or L/S spine  LYMPH: No lymphadenopathy noted  SKIN: No rashes or lesions  NERVOUS SYSTEM:  Alert & Oriented X3, normal cognitive function. Motor Strength 5/5 right upper and right lower.  5/5 left upper and left lower extremities, DTRs 2+ intact and symmetric    LABS:    CARDIAC MARKERS ( 02 Aug 2019 17:53 )  x     / x     / 108 U/L / x     / 2.5 ng/mL      CBC Full  -  ( 03 Aug 2019 04:28 )  WBC Count : 6.9 K/uL  RBC Count : 3.55 M/uL  Hemoglobin : 10.2 g/dL  Hematocrit : 30.7 %  Platelet Count - Automated : 421 K/uL  Mean Cell Volume : 86.5 fl  Mean Cell Hemoglobin : 28.7 pg  Mean Cell Hemoglobin Concentration : 33.2 gm/dL  Auto Neutrophil # : x  Auto Lymphocyte # : x  Auto Monocyte # : x  Auto Eosinophil # : x  Auto Basophil # : x  Auto Neutrophil % : x  Auto Lymphocyte % : x  Auto Monocyte % : x  Auto Eosinophil % : x  Auto Basophil % : x    08    140  |  110<H>  |  15  ----------------------------<  88  4.0   |  20<L>  |  0.88    Ca    8.5      03 Aug 2019 04:28  Phos  2.9     -  Mg     1.8     -    TPro  8.5<H>  /  Alb  4.0  /  TBili  0.6  /  DBili  x   /  AST  19  /  ALT  16  /  AlkPhos  68      LIVER FUNCTIONS - ( 01 Aug 2019 22:00 )  Alb: 4.0 g/dL / Pro: 8.5 g/dL / ALK PHOS: 68 U/L / ALT: 16 U/L / AST: 19 U/L / GGT: x           PT/INR - ( 03 Aug 2019 04:28 )   PT: 11.3 sec;   INR: 0.98 ratio         PTT - ( 03 Aug 2019 04:28 )  PTT:23.9 sec  Urinalysis Basic - ( 02 Aug 2019 02:40 )    Color: Light Yellow / Appearance: Clear / S.017 / pH: x  Gluc: x / Ketone: Trace  / Bili: Negative / Urobili: Negative   Blood: x / Protein: 30 mg/dL / Nitrite: Negative   Leuk Esterase: Moderate / RBC: 1 /hpf / WBC 7 /HPF   Sq Epi: x / Non Sq Epi: 3 /hpf / Bacteria: Negative      PROCEDURE:   CT Angiography of the Chest.  66 ml of Omnipaque 350 was injected intravenously. 34 ml were discarded.  Sagittal and coronal reformats were performed as well as 3D (MIP)   reconstructions.      FINDINGS:    LUNGS AND AIRWAYS: Patent central airways.  A cluster of small nodules   measuring up to 3 mm in the right upper lobe have the appearance of   distal impacted airways.    PLEURA: No pleural effusion.    MEDIASTINUM AND ROZ: No lymphadenopathy.    VESSELS: Acute pulmonary emboli within subsegmental vessels of the right   upper and right lower lobe. Ascending aorta measures up to 4.2 cm.   Coronary calcification.    HEART: Heart size is normal. No pericardial effusion.    CHEST WALL AND LOWER NECK: Within normal limits.    VISUALIZED UPPER ABDOMEN: Within normal limits.    BONES: Degenerative changes. Old rib fractures. Chronic severe   compression fracture of T6.    IMPRESSION:     Acute pulmonary emboli involving subsegmental vessels of the right upper   and right lower lobe.    Findings were discussed with Dr. Dubon  8/3/2019 1:32 PM by Dr. King   with read back confirmation. Patient is a 78y old  Female who presents with a chief complaint of Left femoral neck fracture.  The patient  was in good health on vacation in [place].  She was walking on [email or web address] and tripped and lost her balance and landed on her right hip.  She was told of the fracture and within three days, she has been able to return home to [place].  The x-rays have confirmed a right femoral neck fracture that requires ORIF.  The patient is in pain secondary to the fracture, but otherwise she had no significant complaints. The patient  went to the operating room  for surgical repair of her hip-fracture 2019 and the procedure  was  aborted.  Prior to induction on cardiac monitoring, she was found to have a 6 beat run of wide-complex tachycardia.  She also had frequent VPCs.  The patient was seen by cardiology that evening, and cardiac enzymes were obtained and negative.  The patient was sent for a Doppler study of the leg which showed no evidence for DVT bilaterally.  She then had a CT angiogram of the lungs which shows positive pulmonary emboli in the right upper and right lower lobes.  It is likely that the thrombus traveled to the lung, with movement prior to surgery and caused hypoxemia with sudden onset of cardiac arrhythmia.  Cardiology to advise if  an arrhythmic agent should be started.  The patient continues on telemetry.  She has had no further beats of ventricular tachycardia.  The patient still requires surgical intervention for her fractured hip and is now receiving full dose IV heparin. The patient is rescheduled for ORIF 19 and is scheduled to have a temporary inferior vena cava filter placed before the surgical procedure.      MEDICATIONS  (STANDING):  acetaminophen   Tablet .. 975 milliGRAM(s) Oral every 8 hours  famotidine    Tablet 20 milliGRAM(s) Oral two times a day  lactated ringers. 1000 milliLiter(s) (85 mL/Hr) IV Continuous <Continuous>  valsartan 80 milliGRAM(s) Oral daily    MEDICATIONS  (PRN):  ondansetron Injectable 4 milliGRAM(s) IV Push every 6 hours PRN Nausea and/or Vomiting  oxyCODONE    IR 5 milliGRAM(s) Oral every 6 hours PRN Severe Pain (7 - 10)  oxyCODONE    IR 2.5 milliGRAM(s) Oral every 4 hours PRN Moderate Pain (4 - 6)  traMADol 25 milliGRAM(s) Oral every 4 hours PRN Mild Pain (1 - 3)        Allergies    iodine (Unknown)    ALLERGY AND IMMUNOLOGIC: No hives, No eczema  NEUROLOGICAL: No memory loss, No loss of strength, No numbness, No tremors  VITALS:   T(C): 36.4 (19 @ 13:36), Max: 36.9 (19 @ 00:58)  HR: 89 (19 @ 13:36) (81 - 102)  BP: 143/80 (19 @ 13:36) (100/66 - 143/80)  RR: 18 (19 @ 13:36) (14 - 18)  SpO2: 96% (19 @ 13:36) (94% - 100%)  Wt(kg): --    PHYSICAL EXAM:  GENERAL: NAD, well nourished and conversant  HEAD:  Atraumatic  EYES: EOM, PERRLA, conjunctiva pink and sclera white  ENT: No tonsillar erythema, exudates, or enlargement, moist mucous membranes, good dentition, no lesions  NECK: Supple, No JVD, normal thyroid, carotids with normal upstrokes and no bruits  CHEST/LUNG: Clear to auscultation bilaterally, No rales, rhonchi, wheezing, or rubs  HEART: Regular rate and rhythm, No murmurs, rubs, or gallops  ABDOMEN: Soft, nondistended, no masses, guarding, tenderness or rebound, bowel sounds present  EXTREMITIES:  2+ Peripheral Pulses, No clubbing, cyanosis, or edema. No arthritis of shoulders, elbows, hands, hips, knees, ankles, or feet. No DJD C spine, T spine, or L/S spine  LYMPH: No lymphadenopathy noted  SKIN: No rashes or lesions  NERVOUS SYSTEM:  Alert & Oriented X3, normal cognitive function. Motor Strength 5/5 right upper and right lower.  5/5 left upper and left lower extremities, DTRs 2+ intact and symmetric    LABS:    CARDIAC MARKERS ( 02 Aug 2019 17:53 )  x     / x     / 108 U/L / x     / 2.5 ng/mL      CBC Full  -  ( 03 Aug 2019 04:28 )  WBC Count : 6.9 K/uL  RBC Count : 3.55 M/uL  Hemoglobin : 10.2 g/dL  Hematocrit : 30.7 %  Platelet Count - Automated : 421 K/uL  Mean Cell Volume : 86.5 fl  Mean Cell Hemoglobin : 28.7 pg  Mean Cell Hemoglobin Concentration : 33.2 gm/dL  Auto Neutrophil # : x  Auto Lymphocyte # : x  Auto Monocyte # : x  Auto Eosinophil # : x  Auto Basophil # : x  Auto Neutrophil % : x  Auto Lymphocyte % : x  Auto Monocyte % : x  Auto Eosinophil % : x  Auto Basophil % : x    08-03    140  |  110<H>  |  15  ----------------------------<  88  4.0   |  20<L>  |  0.88    Ca    8.5      03 Aug 2019 04:28  Phos  2.9     08-  Mg     1.8     08-03    TPro  8.5<H>  /  Alb  4.0  /  TBili  0.6  /  DBili  x   /  AST  19  /  ALT  16  /  AlkPhos  68  08-01    LIVER FUNCTIONS - ( 01 Aug 2019 22:00 )  Alb: 4.0 g/dL / Pro: 8.5 g/dL / ALK PHOS: 68 U/L / ALT: 16 U/L / AST: 19 U/L / GGT: x           PT/INR - ( 03 Aug 2019 04:28 )   PT: 11.3 sec;   INR: 0.98 ratio         PTT - ( 03 Aug 2019 04:28 )  PTT:23.9 sec  Urinalysis Basic - ( 02 Aug 2019 02:40 )    Color: Light Yellow / Appearance: Clear / S.017 / pH: x  Gluc: x / Ketone: Trace  / Bili: Negative / Urobili: Negative   Blood: x / Protein: 30 mg/dL / Nitrite: Negative   Leuk Esterase: Moderate / RBC: 1 /hpf / WBC 7 /HPF   Sq Epi: x / Non Sq Epi: 3 /hpf / Bacteria: Negative      PROCEDURE:   CT Angiography of the Chest.  66 ml of Omnipaque 350 was injected intravenously. 34 ml were discarded.  Sagittal and coronal reformats were performed as well as 3D (MIP)   reconstructions.      FINDINGS:    LUNGS AND AIRWAYS: Patent central airways.  A cluster of small nodules   measuring up to 3 mm in the right upper lobe have the appearance of   distal impacted airways.    PLEURA: No pleural effusion.    MEDIASTINUM AND ROZ: No lymphadenopathy.    VESSELS: Acute pulmonary emboli within subsegmental vessels of the right   upper and right lower lobe. Ascending aorta measures up to 4.2 cm.   Coronary calcification.    HEART: Heart size is normal. No pericardial effusion.    CHEST WALL AND LOWER NECK: Within normal limits.    VISUALIZED UPPER ABDOMEN: Within normal limits.    BONES: Degenerative changes. Old rib fractures. Chronic severe   compression fracture of T6.    IMPRESSION:     Acute pulmonary emboli involving subsegmental vessels of the right upper   and right lower lobe.    Findings were discussed with Dr. Dubon  8/3/2019 1:32 PM by Dr. King   with read back confirmation. Patient is a 78y old  Female who presents with a chief complaint of Right femoral neck fracture.  The patient  was in good health on vacation in [place].  She was walking on [address] and tripped and lost her balance and landed on her right hip.  She was told of the fracture and within three days, she has been able to return home to [place].  The x-rays have confirmed a right femoral neck fracture that requires ORIF.  The patient is in pain secondary to the fracture, but otherwise she had no significant complaints. The patient  went to the operating room  for surgical repair of her hip-fracture 2019 and the procedure  was  aborted.  Prior to induction on cardiac monitoring, she was found to have a 6 beat run of wide-complex tachycardia.  She also had frequent VPCs.  The patient was seen by cardiology that evening, and cardiac enzymes were obtained and negative.  The patient was sent for a Doppler study of the leg which showed no evidence for DVT bilaterally.  She then had a CT angiogram of the lungs which shows positive pulmonary emboli in the right upper and right lower lobes.  It is likely that the thrombus traveled to the lung, with movement prior to surgery and caused hypoxemia with sudden onset of cardiac arrhythmia.  Cardiology to advise if  an arrhythmic agent should be started.  The patient continues on telemetry.  She has had no further beats of ventricular tachycardia.  The patient still requires surgical intervention for her fractured hip and is now receiving full dose IV heparin. The patient is rescheduled for ORIF 19 and is scheduled to have a temporary inferior vena cava filter placed before the surgical procedure.      MEDICATIONS  (STANDING):  acetaminophen   Tablet .. 975 milliGRAM(s) Oral every 8 hours  famotidine    Tablet 20 milliGRAM(s) Oral two times a day  lactated ringers. 1000 milliLiter(s) (85 mL/Hr) IV Continuous <Continuous>  valsartan 80 milliGRAM(s) Oral daily    MEDICATIONS  (PRN):  ondansetron Injectable 4 milliGRAM(s) IV Push every 6 hours PRN Nausea and/or Vomiting  oxyCODONE    IR 5 milliGRAM(s) Oral every 6 hours PRN Severe Pain (7 - 10)  oxyCODONE    IR 2.5 milliGRAM(s) Oral every 4 hours PRN Moderate Pain (4 - 6)  traMADol 25 milliGRAM(s) Oral every 4 hours PRN Mild Pain (1 - 3)        Allergies    iodine (Unknown)    ALLERGY AND IMMUNOLOGIC: No hives, No eczema  NEUROLOGICAL: No memory loss, No loss of strength, No numbness, No tremors  VITALS:   T(C): 36.4 (19 @ 13:36), Max: 36.9 (19 @ 00:58)  HR: 89 (19 @ 13:36) (81 - 102)  BP: 143/80 (19 @ 13:36) (100/66 - 143/80)  RR: 18 (19 @ 13:36) (14 - 18)  SpO2: 96% (19 @ 13:36) (94% - 100%)  Wt(kg): --    PHYSICAL EXAM:  GENERAL: NAD, well nourished and conversant  HEAD:  Atraumatic  EYES: EOM, PERRLA, conjunctiva pink and sclera white  ENT: No tonsillar erythema, exudates, or enlargement, moist mucous membranes, good dentition, no lesions  NECK: Supple, No JVD, normal thyroid, carotids with normal upstrokes and no bruits  CHEST/LUNG: Clear to auscultation bilaterally, No rales, rhonchi, wheezing, or rubs  HEART: Regular rate and rhythm, No murmurs, rubs, or gallops  ABDOMEN: Soft, nondistended, no masses, guarding, tenderness or rebound, bowel sounds present  EXTREMITIES:  2+ Peripheral Pulses, No clubbing, cyanosis, or edema. No arthritis of shoulders, elbows, hands, hips, knees, ankles, or feet. No DJD C spine, T spine, or L/S spine  LYMPH: No lymphadenopathy noted  SKIN: No rashes or lesions  NERVOUS SYSTEM:  Alert & Oriented X3, normal cognitive function. Motor Strength 5/5 right upper and right lower.  5/5 left upper and left lower extremities, DTRs 2+ intact and symmetric    LABS:    CARDIAC MARKERS ( 02 Aug 2019 17:53 )  x     / x     / 108 U/L / x     / 2.5 ng/mL      CBC Full  -  ( 03 Aug 2019 04:28 )  WBC Count : 6.9 K/uL  RBC Count : 3.55 M/uL  Hemoglobin : 10.2 g/dL  Hematocrit : 30.7 %  Platelet Count - Automated : 421 K/uL  Mean Cell Volume : 86.5 fl  Mean Cell Hemoglobin : 28.7 pg  Mean Cell Hemoglobin Concentration : 33.2 gm/dL  Auto Neutrophil # : x  Auto Lymphocyte # : x  Auto Monocyte # : x  Auto Eosinophil # : x  Auto Basophil # : x  Auto Neutrophil % : x  Auto Lymphocyte % : x  Auto Monocyte % : x  Auto Eosinophil % : x  Auto Basophil % : x    08-03    140  |  110<H>  |  15  ----------------------------<  88  4.0   |  20<L>  |  0.88    Ca    8.5      03 Aug 2019 04:28  Phos  2.9     08-  Mg     1.8     08-03    TPro  8.5<H>  /  Alb  4.0  /  TBili  0.6  /  DBili  x   /  AST  19  /  ALT  16  /  AlkPhos  68  08-01    LIVER FUNCTIONS - ( 01 Aug 2019 22:00 )  Alb: 4.0 g/dL / Pro: 8.5 g/dL / ALK PHOS: 68 U/L / ALT: 16 U/L / AST: 19 U/L / GGT: x           PT/INR - ( 03 Aug 2019 04:28 )   PT: 11.3 sec;   INR: 0.98 ratio         PTT - ( 03 Aug 2019 04:28 )  PTT:23.9 sec  Urinalysis Basic - ( 02 Aug 2019 02:40 )    Color: Light Yellow / Appearance: Clear / S.017 / pH: x  Gluc: x / Ketone: Trace  / Bili: Negative / Urobili: Negative   Blood: x / Protein: 30 mg/dL / Nitrite: Negative   Leuk Esterase: Moderate / RBC: 1 /hpf / WBC 7 /HPF   Sq Epi: x / Non Sq Epi: 3 /hpf / Bacteria: Negative      PROCEDURE:   CT Angiography of the Chest.  66 ml of Omnipaque 350 was injected intravenously. 34 ml were discarded.  Sagittal and coronal reformats were performed as well as 3D (MIP)   reconstructions.      FINDINGS:    LUNGS AND AIRWAYS: Patent central airways.  A cluster of small nodules   measuring up to 3 mm in the right upper lobe have the appearance of   distal impacted airways.    PLEURA: No pleural effusion.    MEDIASTINUM AND ROZ: No lymphadenopathy.    VESSELS: Acute pulmonary emboli within subsegmental vessels of the right   upper and right lower lobe. Ascending aorta measures up to 4.2 cm.   Coronary calcification.    HEART: Heart size is normal. No pericardial effusion.    CHEST WALL AND LOWER NECK: Within normal limits.    VISUALIZED UPPER ABDOMEN: Within normal limits.    BONES: Degenerative changes. Old rib fractures. Chronic severe   compression fracture of T6.    IMPRESSION:     Acute pulmonary emboli involving subsegmental vessels of the right upper   and right lower lobe.    Findings were discussed with Dr. Dubon  8/3/2019 1:32 PM by Dr. King   with read back confirmation.

## 2019-08-03 NOTE — PROGRESS NOTE ADULT - ASSESSMENT
A/P: 78F w/ R FN Fx  Plan for OR today for total hip arthroplasty pending cardiology clearance  CT Angio chest ordered to r/o PE  B/l LE US Dopplers ordered   Cardiology recs appreciated  Analgesia  Hold all chemical DVT ppx for possible OR Today  NPO  IVF while NPO  NWB RLE  PT/OT  Encourage incentive spirometry  Will discuss with attending and advise if plan changes

## 2019-08-03 NOTE — CONSULT NOTE ADULT - ASSESSMENT
A/P:     77 y/o F with right femoral neck fracture s/p mechanical fall while on vacation in [place], awaiting ORIF of fracture, found to have acute pulmonary emboli on segmental vessels in right upper and right lower lobe on CTA chest, consulting vascular surgery for IVC filter placement prior to surgery:       Plan:     - Plan to follow     Vascular Surgery A/P:     79 y/o F with right femoral neck fracture s/p mechanical fall while on vacation in [place], awaiting ORIF of fracture, found to have acute pulmonary emboli on segmental vessels in right upper and right lower lobe on CTA chest, consulting vascular surgery for IVC filter placement prior to surgery:       Plan:     - Recomend     Vascular Surgery A/P:     79 y/o F with right femoral neck fracture s/p mechanical fall while on vacation in [place], awaiting ORIF of fracture, found to have acute pulmonary emboli on segmental vessels in right upper and right lower lobe on CTA chest, consulting vascular surgery for IVC filter placement prior to surgery:       Plan:     - Vascular surgery unable to place IVC filter prior to surgery tomorrow, recommend consulting IR for placement of filter tonight.   - Please reconsult as needed    - Plan discussed with vascular surgery fellow     Vascular Surgery

## 2019-08-03 NOTE — PROGRESS NOTE ADULT - SUBJECTIVE AND OBJECTIVE BOX
Patient seen and examined at bedside. Reports no acute complaints at this time. Pain is well controlled. Had 7 beats of wide complex atrial tachycardia overnight. VSS stable and patient remained asymptomatic. Case was cancelled yesterday 2/2 to arrythmia in OR, cardiology consulted.    PHYSICAL EXAM:  Vital Signs Last 24 Hrs  T(C): 36.5 (03 Aug 2019 06:56), Max: 36.9 (03 Aug 2019 00:58)  T(F): 97.7 (03 Aug 2019 06:56), Max: 98.4 (03 Aug 2019 00:58)  HR: 86 (03 Aug 2019 06:56) (71 - 102)  BP: 114/69 (03 Aug 2019 06:56) (100/66 - 135/75)  BP(mean): 89 (02 Aug 2019 19:30) (89 - 98)  RR: 17 (03 Aug 2019 06:56) (14 - 18)  SpO2: 97% (03 Aug 2019 06:56) (94% - 100%)    Gen: NAD, AAOx3    Right Lower Extremity:  Skin intact  +EHL/FHL/TA/GS  SILT L3-S1  +DP/PT Pulses  Compartments soft  No calf TTP B/L Patient seen and examined at bedside. Reports no acute complaints at this time. Pain is well controlled. Had 7 beats of wide complex tachycardia overnight. VSS stable and patient remained asymptomatic. Case was cancelled yesterday 2/2 to arrythmia in OR, cardiology consulted.    PHYSICAL EXAM:  Vital Signs Last 24 Hrs  T(C): 36.5 (03 Aug 2019 06:56), Max: 36.9 (03 Aug 2019 00:58)  T(F): 97.7 (03 Aug 2019 06:56), Max: 98.4 (03 Aug 2019 00:58)  HR: 86 (03 Aug 2019 06:56) (71 - 102)  BP: 114/69 (03 Aug 2019 06:56) (100/66 - 135/75)  BP(mean): 89 (02 Aug 2019 19:30) (89 - 98)  RR: 17 (03 Aug 2019 06:56) (14 - 18)  SpO2: 97% (03 Aug 2019 06:56) (94% - 100%)    Gen: NAD, AAOx3    Right Lower Extremity:  Skin intact  +EHL/FHL/TA/GS  SILT L3-S1  +DP/PT Pulses  Compartments soft  No calf TTP B/L

## 2019-08-03 NOTE — CONSULT NOTE ADULT - SUBJECTIVE AND OBJECTIVE BOX
Vascular Surgery Consult  Consulting surgical team: Vascular surgery   Consulting attending: Karlie     HPI:    77 y/o F s/p mechanical fall in [place] while on vacation found to have R femoral neck fracture. Patient was prepped for OR with orthopeadic surgery however prior to intubation, the patient had a short run of Vtach and followed by a short run of ventricular bigeminy and case was aborted. Following incident patient received CTA chest which was positive for acute PEs involving submental vessels of right upper and right lower lobe. US LE was negative for DVT. Ortho consulting vascular surgery for placement of IVC filter prior to right femoral neck ORIF tomorrow.     PAST MEDICAL & SURGICAL HISTORY:  UTI (urinary tract infection): 1 week ago txted by pmd  DVT (deep venous thrombosis), left:  txted with coumadin x 6 months  Wrist fracture  Osteoporosis  HTN (hypertension)  Leg fracture, left: tibia - hardware insitu -     MEDICATIONS  (STANDING):    MEDICATIONS  (PRN):    Allergies    iodine (Unknown)    Intolerances        T(C): 36.9 (19 @ 21:49), Max: 36.9 (19 @ 19:34)  HR: 98 (19 @ 21:49) (72 - 98)  BP: 168/94 (19 @ 21:49) (148/86 - 168/94)  RR: 20 (19 @ 21:49) (18 - 20)  SpO2: 100% (19 @ 21:49) (96% - 100%)  Wt(kg): --        Imaging:  XR demonstrating R femoral neck  fracture (01 Aug 2019 23:59)      PAST MEDICAL HISTORY:  UTI (urinary tract infection)  DVT (deep venous thrombosis), left  Wrist fracture  Osteoporosis  HTN (hypertension)      PAST SURGICAL HISTORY:  Leg fracture, left      MEDICATIONS:  acetaminophen   Tablet .. 975 milliGRAM(s) Oral every 8 hours  famotidine    Tablet 20 milliGRAM(s) Oral two times a day  ondansetron Injectable 4 milliGRAM(s) IV Push every 6 hours PRN  oxyCODONE    IR 5 milliGRAM(s) Oral every 6 hours PRN  oxyCODONE    IR 2.5 milliGRAM(s) Oral every 4 hours PRN  sodium chloride 0.9%. 1000 milliLiter(s) IV Continuous <Continuous>  traMADol 25 milliGRAM(s) Oral every 4 hours PRN  valsartan 80 milliGRAM(s) Oral daily      ALLERGIES:  iodine (Unknown)      VITALS & I/Os:  Vital Signs Last 24 Hrs  T(C): 36.4 (03 Aug 2019 13:36), Max: 36.9 (03 Aug 2019 00:58)  T(F): 97.5 (03 Aug 2019 13:36), Max: 98.4 (03 Aug 2019 00:58)  HR: 89 (03 Aug 2019 13:36) (81 - 102)  BP: 143/80 (03 Aug 2019 13:36) (100/66 - 143/80)  BP(mean): 89 (02 Aug 2019 19:30) (89 - 98)  RR: 18 (03 Aug 2019 13:36) (14 - 18)  SpO2: 96% (03 Aug 2019 13:36) (94% - 100%)    I&O's Summary    02 Aug 2019 07:01  -  03 Aug 2019 07:00  --------------------------------------------------------  IN: 850 mL / OUT: 1250 mL / NET: -400 mL        PHYSICAL EXAM:  General: No acute distress  Respiratory: Nonlabored  Cardiovascular: RRR  Abdominal: Soft, nondistended, nontender. No rebound or guarding. No organomegaly, no palpable mass.  Extremities: Warm, R foot in soft splint. No calf tenderness on L.     LABS:                        10.2   6.9   )-----------( 421      ( 03 Aug 2019 04:28 )             30.7     08-03    140  |  110<H>  |  15  ----------------------------<  88  4.0   |  20<L>  |  0.88    Ca    8.5      03 Aug 2019 04:28  Phos  2.9     08-03  Mg     1.8     -03    TPro  8.5<H>  /  Alb  4.0  /  TBili  0.6  /  DBili  x   /  AST  19  /  ALT  16  /  AlkPhos  68  08-01    Lactate:    PT/INR - ( 03 Aug 2019 04:28 )   PT: 11.3 sec;   INR: 0.98 ratio         PTT - ( 03 Aug 2019 04:28 )  PTT:23.9 sec    CARDIAC MARKERS ( 02 Aug 2019 17:53 )  x     / x     / 108 U/L / x     / 2.5 ng/mL        Urinalysis Basic - ( 02 Aug 2019 02:40 )    Color: Light Yellow / Appearance: Clear / S.017 / pH: x  Gluc: x / Ketone: Trace  / Bili: Negative / Urobili: Negative   Blood: x / Protein: 30 mg/dL / Nitrite: Negative   Leuk Esterase: Moderate / RBC: 1 /hpf / WBC 7 /HPF   Sq Epi: x / Non Sq Epi: 3 /hpf / Bacteria: Negative        IMAGING:

## 2019-08-03 NOTE — CONSULT NOTE ADULT - SUBJECTIVE AND OBJECTIVE BOX
Initial Cardiology Attending Consult    CHIEF COMPLAINT: hip fx    HISTORY OF PRESENT ILLNESS:  MAICOL PATTEN is a 78y Female patient PMH HTN, LLE DVT, osteoporosis c/b spinal compression fracture, L tibial fracture, R wrist fracture and L femur fracture s/p THR p/w hip pain. Pt states she was in [place] on Tuesday and tri[place] on [employer] and fell on her right hip. Pt states she was ambulating normally w/o a walker prior to falling and is now unable to ambulate. Pt denied lightheadedness/dizziness, chest pain, SOB, syncope, head trauma and LOC. Pt went to the hospital in [place] where she had an XR demonstrating R hip fracture. Pt was prescribed lovenox to take prior to her plane ride home. She came to the ED from the airport and was found to have a hip fx.   This evening she presented for surgical correction of her fx and had 10-12 beats of WCT followed by geminiinmichael for approx. 1 min during induction.  She had a mild drop in BP at the time but maintained MAP > 70.   Cardiology is now consulted    At baseline she admits to SOB with walking up stairs and intermittent leg swelling with occasional palpitations    Allergies    iodine (Unknown)    Intolerances    	    PAST MEDICAL & SURGICAL HISTORY:  UTI (urinary tract infection): 1 week ago txted by pmd  DVT (deep venous thrombosis), left: 2009 txted with coumadin x 6 months  Wrist fracture  Osteoporosis  HTN (hypertension)  Leg fracture, left: tibia - hardware insitu - 2009      FAMILY HISTORY:  No pertinent family history in first degree relatives      SOCIAL HISTORY:    [ ] Non-smoker  [ ] Smoker  [ ] Alcohol      REVIEW OF SYSTEMS:  CONSTITUTIONAL: No fever, weight loss, or fatigue  EYES: No eye pain, visual disturbances, or discharge  ENMT:  No difficulty hearing, tinnitus, vertigo; No sinus or throat pain  NECK: No pain or stiffness  RESPIRATORY: No cough, wheezing, chills or hemoptysis; + Shortness of Breath  CARDIOVASCULAR: No chest pain, +palpitations, no passing out, dizziness, occ leg swelling  GASTROINTESTINAL: No abdominal or epigastric pain. No nausea, vomiting, or hematemesis; No diarrhea or constipation. No melena or hematochezia.  GENITOURINARY: No dysuria, frequency, hematuria, or incontinence  NEUROLOGICAL: No headaches, memory loss, loss of strength, numbness, or tremors  SKIN: No itching, burning, rashes, or lesions   LYMPH Nodes: No enlarged glands  ENDOCRINE: No heat or cold intolerance; No hair loss  MUSCULOSKELETAL: No joint pain or swelling; No muscle, back, or extremity pain  PSYCHIATRIC: No depression, anxiety, mood swings, or difficulty sleeping  HEME/LYMPH: No easy bruising, or bleeding gums  ALLERY AND IMMUNOLOGIC: No hives or eczema	    [ ] All others negative	  [ ] Unable to obtain  Vital Signs Last 24 Hrs  T(C): 36.5 (02 Aug 2019 21:30), Max: 37.1 (02 Aug 2019 01:18)  T(F): 97.7 (02 Aug 2019 21:30), Max: 98.7 (02 Aug 2019 01:18)  HR: 99 (02 Aug 2019 21:30) (71 - 100)  BP: 114/65 (02 Aug 2019 21:30) (114/65 - 149/90)  BP(mean): 89 (02 Aug 2019 19:30) (89 - 98)  RR: 14 (02 Aug 2019 21:30) (14 - 16)  SpO2: 99% (02 Aug 2019 21:30) (94% - 100%)  PHYSICAL EXAM:  I&O's Summary    01 Aug 2019 07:01  -  02 Aug 2019 07:00  --------------------------------------------------------  IN: 0 mL / OUT: 200 mL / NET: -200 mL    02 Aug 2019 07:01  -  03 Aug 2019 00:10  --------------------------------------------------------  IN: 340 mL / OUT: 1100 mL / NET: -760 mL        Appearance: Normal	  HEENT:   Normal oral mucosa, PERRL, EOMI	  Lymphatic: No lymphadenopathy  Cardiovascular: Normal S1 S2, No JVD, No murmurs, No edema  Respiratory: Lungs clear to auscultation	  Psychiatry: A & O x 3, Mood & affect appropriate  Gastrointestinal:  Soft, Non-tender, + BS	  Skin: No rashes, No ecchymoses, No cyanosis	  Neurologic: Non-focal  Extremities: Normal range of motion, No clubbing, cyanosis or edema  Vascular: Peripheral pulses palpable 2+ bilaterally    MEDICATIONS:  Home Medications:  acetaminophen 325 mg oral tablet: 2 tab(s) orally every 6 hours, As needed, For Temp greater than 38 C (100.4 F) OR Mild pain (19 Dec 2016 14:54)  docusate sodium 100 mg oral capsule: 1 cap(s) orally 3 times a day (19 Dec 2016 09:38)  folic acid 1 mg oral tablet: 1 tab(s) orally once a day (19 Dec 2016 09:38)  Multiple Vitamins oral tablet: 1 tab(s) orally once a day (19 Dec 2016 09:38)  oxyCODONE 10 mg oral tablet: 1 tab(s) orally every 4 hours, As needed, Severe Pain (19 Dec 2016 14:54)  oxyCODONE 5 mg oral tablet: 1 tab(s) orally every 4 hours, As needed, Moderate Pain (19 Dec 2016 14:54)  polyethylene glycol 3350 oral powder for reconstitution: 17 gram(s) orally once a day (19 Dec 2016 09:38)  rivaroxaban 10 mg oral tablet: 1 tab(s) orally once a day; Continue to take for 6 weeks from 12/16/16 unless otherwise instructed by your surgeon.  (19 Dec 2016 14:54)  senna oral tablet: 2 tab(s) orally once a day (at bedtime), As needed, Constipation (19 Dec 2016 09:38)  valsartan 80 mg oral tablet: 1 tab(s) orally once a day (19 Dec 2016 09:38)      LABS:	 	    CBC Full  -  ( 02 Aug 2019 17:53 )  WBC Count : 8.4 K/uL  Hemoglobin : 10.6 g/dL  Hematocrit : 32.5 %  Platelet Count - Automated : 457 K/uL  Mean Cell Volume : 86.4 fl  Mean Cell Hemoglobin : 28.2 pg  Mean Cell Hemoglobin Concentration : 32.6 gm/dL  Auto Neutrophil # : x  Auto Lymphocyte # : x  Auto Monocyte # : x  Auto Eosinophil # : x  Auto Basophil # : x  Auto Neutrophil % : x  Auto Lymphocyte % : x  Auto Monocyte % : x  Auto Eosinophil % : x  Auto Basophil % : x    08-02    140  |  106  |  20  ----------------------------<  116<H>  4.0   |  22  |  1.02  08-01    139  |  103  |  28<H>  ----------------------------<  104<H>  3.9   |  21<L>  |  1.19    Ca    8.4      02 Aug 2019 04:46  Ca    9.8      01 Aug 2019 22:00  Mg     1.8     08-02    TPro  8.5<H>  /  Alb  4.0  /  TBili  0.6  /  DBili  x   /  AST  19  /  ALT  16  /  AlkPhos  68  08-01      proBNP:   Lipid Profile:   HgA1c:   TSH:     TROPONIN: 8  CARDIAC MARKERS ( 02 Aug 2019 17:53 )  x     / x     / 108 U/L / x     / 2.5 ng/mL        TELEMETRY: 	  SR  ECG:  	SR RBBB, LAFB  RADIOLOGY:  OTHER: 	    CARDIAC TESTING/STUDIES:    [ ] Echocardiogram:  -----------------------------------------------------------------------  Conclusions:  1. Normal mitral valve. Minimal mitral regurgitation.  2. Calcified, possible bicuspid aortic valve. Peak  transaortic valve gradient equals 7 mm Hg, estimated aortic  valve area equals 2 sqcm (by continuity equation),  consistent with mild aortic stenosis. Mild aortic  regurgitation.  3. Proximal septal thickening (proximal septum 1.1cm)  otherwise normal left ventricular internal dimensions and  wall thickness.  4. Hyperdynamic left ventricular systolic function.  Flattening of the interventricular septum in both systole  and diastole is  consistent with right ventricular pressure  overload.  5. Right ventricular enlargement with normal right  ventricular systolic function.  6. Normal pericardium with no pericardial effusion.  *** No previous Echo exam.  ------------------------------------------------------------------------  Confirmed on  8/2/2019 - 21:26:12 by Phillip Jensen M.D.    [ ]  Catheterization:  [ ] Stress Test:  	  	  ASSESSMENT/PLAN: 	  MAICOL PATTEN is a 78y Female patient PMH HTN, LLE DVT, osteoporosis c/b spinal compression fracture, L tibial fracture, R wrist fracture and L femur fracture s/p THR p/w hip pain. Pt states she was in [place] on Tuesday and tripped on [place] and fell on her right hip. Pt states she was ambulating normally w/o a walker prior to falling and is now unable to ambulate. Pt denied lightheadedness/dizziness, chest pain, SOB, syncope, head trauma and LOC. Pt went to the hospital in [place] where she had an XR demonstrating R hip fracture. Pt was prescribed lovenox to take prior to her plane ride home. She came to the ED from the airport and was found to have a hip fx.   This evening she presented for surgical correction of her fx and had 10-12 beats of WCT followed by bigeminy for approx. 1 min during induction.  She had a mild drop in BP at the time but maintained MAP > 70.     Her EKG is unchanged, strips from the event are not available. Her ECHO is concerning for possible PE. please eval for PE and   monitor mg and K   monitor on tele    trend troponin x2      Phillip Jensen MD, PhD  Cardiology Attending  [facility]  [phone]

## 2019-08-03 NOTE — PROGRESS NOTE ADULT - ASSESSMENT
The patient  went to the operating room  for surgical repair of her hip-fracture August 2, 2019 and the procedure  was  aborted.  Prior to induction on cardiac monitoring, she was found to have a 6 beat run of wide-complex tachycardia.  She also had frequent VPCs.  The patient was seen by cardiology that evening, and cardiac enzymes were obtained and negative.  The patient was sent for a Doppler study of the leg which showed no evidence for DVT bilaterally.  She then had a CT angiogram of the lungs which shows positive pulmonary emboli in the right upper and right lower lobes.  It is likely that the thrombus traveled to the lung, with movement prior to surgery and caused hypoxemia with sudden onset of cardiac arrhythmia.  Cardiology to advise if  an arrhythmic agent should be started.  The patient continues on telemetry.  She has had no further beats of ventricular tachycardia.  The patient still requires surgical intervention for her fractured hip and is now receiving full dose IV heparin. The patient is rescheduled for ORIF 8/4/19 and is scheduled to have a temporary inferior vena cava filter placed before the surgical procedure. The patient  went to the operating room  for surgical repair of her right hip-fracture August 2, 2019 and the procedure  was  aborted.  Prior to induction on cardiac monitoring, she was found to have a 6 beat run of wide-complex tachycardia.  She also had frequent VPCs.  The patient was seen by cardiology that evening, and cardiac enzymes were obtained and negative.  The patient was sent for a Doppler study of the leg which showed no evidence for DVT bilaterally.  She then had a CT angiogram of the lungs which shows positive pulmonary emboli in the right upper and right lower lobes.  It is likely that the thrombus traveled to the lung, with movement prior to surgery and caused hypoxemia with sudden onset of cardiac arrhythmia.  Cardiology to advise if  an arrhythmic agent should be started.  The patient continues on telemetry.  She has had no further beats of ventricular tachycardia.  The patient still requires surgical intervention for her fractured hip and is now receiving full dose IV heparin. The patient is rescheduled for ORIF 8/4/19 and is scheduled to have a temporary inferior vena cava filter placed before the surgical procedure.

## 2019-08-04 LAB
ANION GAP SERPL CALC-SCNC: 11 MMOL/L — SIGNIFICANT CHANGE UP (ref 5–17)
APTT BLD: 28.3 SEC — SIGNIFICANT CHANGE UP (ref 27.5–36.3)
APTT BLD: 51.5 SEC — HIGH (ref 27.5–36.3)
BLD GP AB SCN SERPL QL: NEGATIVE — SIGNIFICANT CHANGE UP
BUN SERPL-MCNC: 28 MG/DL — HIGH (ref 7–23)
CALCIUM SERPL-MCNC: 9.1 MG/DL — SIGNIFICANT CHANGE UP (ref 8.4–10.5)
CHLORIDE SERPL-SCNC: 106 MMOL/L — SIGNIFICANT CHANGE UP (ref 96–108)
CO2 SERPL-SCNC: 21 MMOL/L — LOW (ref 22–31)
CREAT SERPL-MCNC: 1.37 MG/DL — HIGH (ref 0.5–1.3)
GLUCOSE SERPL-MCNC: 102 MG/DL — HIGH (ref 70–99)
HCT VFR BLD CALC: 32.8 % — LOW (ref 34.5–45)
HCT VFR BLD CALC: 36.7 % — SIGNIFICANT CHANGE UP (ref 34.5–45)
HGB BLD-MCNC: 10.7 G/DL — LOW (ref 11.5–15.5)
HGB BLD-MCNC: 11.7 G/DL — SIGNIFICANT CHANGE UP (ref 11.5–15.5)
INR BLD: 0.99 RATIO — SIGNIFICANT CHANGE UP (ref 0.88–1.16)
MCHC RBC-ENTMCNC: 27.1 PG — SIGNIFICANT CHANGE UP (ref 27–34)
MCHC RBC-ENTMCNC: 27.9 PG — SIGNIFICANT CHANGE UP (ref 27–34)
MCHC RBC-ENTMCNC: 31.7 GM/DL — LOW (ref 32–36)
MCHC RBC-ENTMCNC: 32.5 GM/DL — SIGNIFICANT CHANGE UP (ref 32–36)
MCV RBC AUTO: 85.5 FL — SIGNIFICANT CHANGE UP (ref 80–100)
MCV RBC AUTO: 85.8 FL — SIGNIFICANT CHANGE UP (ref 80–100)
PLATELET # BLD AUTO: 474 K/UL — HIGH (ref 150–400)
PLATELET # BLD AUTO: 508 K/UL — HIGH (ref 150–400)
POTASSIUM SERPL-MCNC: 4 MMOL/L — SIGNIFICANT CHANGE UP (ref 3.5–5.3)
POTASSIUM SERPL-SCNC: 4 MMOL/L — SIGNIFICANT CHANGE UP (ref 3.5–5.3)
PROTHROM AB SERPL-ACNC: 11.3 SEC — SIGNIFICANT CHANGE UP (ref 10–12.9)
RBC # BLD: 3.83 M/UL — SIGNIFICANT CHANGE UP (ref 3.8–5.2)
RBC # BLD: 4.3 M/UL — SIGNIFICANT CHANGE UP (ref 3.8–5.2)
RBC # FLD: 14.1 % — SIGNIFICANT CHANGE UP (ref 10.3–14.5)
RBC # FLD: 14.2 % — SIGNIFICANT CHANGE UP (ref 10.3–14.5)
RH IG SCN BLD-IMP: POSITIVE — SIGNIFICANT CHANGE UP
SODIUM SERPL-SCNC: 138 MMOL/L — SIGNIFICANT CHANGE UP (ref 135–145)
WBC # BLD: 6.7 K/UL — SIGNIFICANT CHANGE UP (ref 3.8–10.5)
WBC # BLD: 8.6 K/UL — SIGNIFICANT CHANGE UP (ref 3.8–10.5)
WBC # FLD AUTO: 6.7 K/UL — SIGNIFICANT CHANGE UP (ref 3.8–10.5)
WBC # FLD AUTO: 8.6 K/UL — SIGNIFICANT CHANGE UP (ref 3.8–10.5)

## 2019-08-04 PROCEDURE — 37191 INS ENDOVAS VENA CAVA FILTR: CPT

## 2019-08-04 PROCEDURE — 99233 SBSQ HOSP IP/OBS HIGH 50: CPT

## 2019-08-04 RX ORDER — DIPHENHYDRAMINE HCL 50 MG
50 CAPSULE ORAL ONCE
Refills: 0 | Status: COMPLETED | OUTPATIENT
Start: 2019-08-04 | End: 2019-08-04

## 2019-08-04 RX ADMIN — Medication 45 MILLIGRAM(S): at 10:15

## 2019-08-04 RX ADMIN — TRAMADOL HYDROCHLORIDE 25 MILLIGRAM(S): 50 TABLET ORAL at 13:51

## 2019-08-04 RX ADMIN — SODIUM CHLORIDE 100 MILLILITER(S): 9 INJECTION INTRAMUSCULAR; INTRAVENOUS; SUBCUTANEOUS at 08:00

## 2019-08-04 RX ADMIN — Medication 975 MILLIGRAM(S): at 16:30

## 2019-08-04 RX ADMIN — Medication 975 MILLIGRAM(S): at 15:48

## 2019-08-04 RX ADMIN — Medication 975 MILLIGRAM(S): at 22:15

## 2019-08-04 RX ADMIN — VALSARTAN 80 MILLIGRAM(S): 80 TABLET ORAL at 05:38

## 2019-08-04 RX ADMIN — HEPARIN SODIUM 9 UNIT(S)/HR: 5000 INJECTION INTRAVENOUS; SUBCUTANEOUS at 02:15

## 2019-08-04 RX ADMIN — Medication 975 MILLIGRAM(S): at 05:36

## 2019-08-04 RX ADMIN — TRAMADOL HYDROCHLORIDE 25 MILLIGRAM(S): 50 TABLET ORAL at 12:51

## 2019-08-04 RX ADMIN — FAMOTIDINE 20 MILLIGRAM(S): 10 INJECTION INTRAVENOUS at 17:07

## 2019-08-04 RX ADMIN — SODIUM CHLORIDE 100 MILLILITER(S): 9 INJECTION INTRAMUSCULAR; INTRAVENOUS; SUBCUTANEOUS at 00:02

## 2019-08-04 RX ADMIN — Medication 975 MILLIGRAM(S): at 22:12

## 2019-08-04 RX ADMIN — Medication 50 MILLIGRAM(S): at 11:01

## 2019-08-04 RX ADMIN — Medication 975 MILLIGRAM(S): at 05:44

## 2019-08-04 RX ADMIN — FAMOTIDINE 20 MILLIGRAM(S): 10 INJECTION INTRAVENOUS at 05:36

## 2019-08-04 NOTE — PROGRESS NOTE ADULT - SUBJECTIVE AND OBJECTIVE BOX
Interventional Radiology Pre-Procedure Note    This is a 78y Female with left hip fracture found to have subsegmental PE's (negative LE dopplers) referred to IR for preoperative IVC filter placement.     Procedure: IVC filter     Diagnosis/Indication: Left femoral neck fracture/Pulmonary Embolism      PAST MEDICAL & SURGICAL HISTORY:  UTI (urinary tract infection): 1 week ago txted by pmd  DVT (deep venous thrombosis), left: 2009 txted with coumadin x 6 months  Wrist fracture  Osteoporosis  HTN (hypertension)  Leg fracture, left: tibia - hardware insitu - 2009     Female    Allergies: iodine (Unknown)      LABS:  CBC Full  -  ( 04 Aug 2019 05:40 )  WBC Count : 8.6 K/uL  RBC Count : 3.83 M/uL  Hemoglobin : 10.7 g/dL  Hematocrit : 32.8 %  Platelet Count - Automated : 474 K/uL  Mean Cell Volume : 85.8 fl  Mean Cell Hemoglobin : 27.9 pg  Mean Cell Hemoglobin Concentration : 32.5 gm/dL      08-04    138  |  106  |  28<H>  ----------------------------<  102<H>  4.0   |  21<L>  |  1.37<H>    Ca    9.1      04 Aug 2019 05:40  Phos  2.9     08-03  Mg     1.8     08-03      PT/INR - ( 04 Aug 2019 05:40 )   PT: 11.3 sec;   INR: 0.99 ratio         PTT - ( 04 Aug 2019 05:40 )  PTT:28.3 sec    Procedure/ risks/ benefits were explained, informed consent obtained from patient, verbalizes understanding.     Plan:  -IVC filter insertion

## 2019-08-04 NOTE — PROGRESS NOTE ADULT - SUBJECTIVE AND OBJECTIVE BOX
Consult:  · Requested by Name:	Tanisha	  · Date/Time:	04-Aug-2019 	  · Reason for Referral/Consultation:	PE/IVC Filter/Periop Hip Fx	      · Subjective and Objective: 	  Initial Cardiology Attending Consult    CHIEF COMPLAINT: hip fx    HISTORY OF PRESENT ILLNESS:  MAICOL PATTEN is a 78y Female patient PMH HTN, LLE DVT, osteoporosis c/b spinal compression fracture, L tibial fracture, R wrist fracture and L femur fracture s/p THR p/w hip pain. Pt states she was in [place] on Tuesday and tripped on [place] and fell on her right hip. Pt states she was ambulating normally w/o a walker prior to falling and is now unable to ambulate. Pt denied lightheadedness/dizziness, chest pain, SOB, syncope, head trauma and LOC. Pt went to the hospital in [place] where she had an XR demonstrating R hip fracture. Pt was prescribed lovenox to take prior to her plane ride home. She came to the ED from the airport and was found to have a hip fx.   This evening she presented for surgical correction of her fx and had 10-12 beats of WCT followed by shane for approx. 1 min during induction.  She had a mild drop in BP at the time but maintained MAP > 70.   Cardiology is now consulted    At baseline she admits to SOB with walking up stairs and intermittent leg swelling with occasional palpitations    Allergies    iodine (Unknown)    Intolerances    ===========================  Interval Events  - S/p IVC Filter   - Echo with enlarged RV and pressures, but preserved function  - BP stable  - No reported worsening CP/Palps/SOB\  ===========================      	    PAST MEDICAL & SURGICAL HISTORY:  UTI (urinary tract infection): 1 week ago txted by pmd  DVT (deep venous thrombosis), left: 2009 txted with coumadin x 6 months  Wrist fracture  Osteoporosis  HTN (hypertension)  Leg fracture, left: tibia - hardware insitu - 2009      FAMILY HISTORY:  No pertinent family history in first degree relatives      SOCIAL HISTORY:    [ ] Non-smoker  [ ] Smoker  [ ] Alcohol      REVIEW OF SYSTEMS:  CONSTITUTIONAL: No fever, weight loss, or fatigue  EYES: No eye pain, visual disturbances, or discharge  ENMT:  No difficulty hearing, tinnitus, vertigo; No sinus or throat pain  NECK: No pain or stiffness  RESPIRATORY: No cough, wheezing, chills or hemoptysis; + Shortness of Breath  CARDIOVASCULAR: No chest pain, +palpitations, no passing out, dizziness, occ leg swelling  GASTROINTESTINAL: No abdominal or epigastric pain. No nausea, vomiting, or hematemesis; No diarrhea or constipation. No melena or hematochezia.  GENITOURINARY: No dysuria, frequency, hematuria, or incontinence  NEUROLOGICAL: No headaches, memory loss, loss of strength, numbness, or tremors  SKIN: No itching, burning, rashes, or lesions   LYMPH Nodes: No enlarged glands  ENDOCRINE: No heat or cold intolerance; No hair loss  MUSCULOSKELETAL: No joint pain or swelling; No muscle, back, or extremity pain  PSYCHIATRIC: No depression, anxiety, mood swings, or difficulty sleeping  HEME/LYMPH: No easy bruising, or bleeding gums  ALLERY AND IMMUNOLOGIC: No hives or eczema	    [ ] All others negative	  [ ] Unable to obtain  Vital Signs Last 24 Hrs  T(C): 36.5 (02 Aug 2019 21:30), Max: 37.1 (02 Aug 2019 01:18)  T(F): 97.7 (02 Aug 2019 21:30), Max: 98.7 (02 Aug 2019 01:18)  HR: 99 (02 Aug 2019 21:30) (71 - 100)  BP: 114/65 (02 Aug 2019 21:30) (114/65 - 149/90)  BP(mean): 89 (02 Aug 2019 19:30) (89 - 98)  RR: 14 (02 Aug 2019 21:30) (14 - 16)  SpO2: 99% (02 Aug 2019 21:30) (94% - 100%)  PHYSICAL EXAM:  I&O's Summary    01 Aug 2019 07:01  -  02 Aug 2019 07:00  --------------------------------------------------------  IN: 0 mL / OUT: 200 mL / NET: -200 mL    02 Aug 2019 07:01  -  03 Aug 2019 00:10  --------------------------------------------------------  IN: 340 mL / OUT: 1100 mL / NET: -760 mL        Appearance: Normal	  HEENT:   Normal oral mucosa, PERRL, EOMI	  Lymphatic: No lymphadenopathy  Cardiovascular: Normal S1 S2, No JVD, No murmurs, No edema  Respiratory: Lungs clear to auscultation	  Psychiatry: A & O x 3, Mood & affect appropriate  Gastrointestinal:  Soft, Non-tender, + BS	  Skin: No rashes, No ecchymoses, No cyanosis	  Neurologic: Non-focal  Extremities: +Limited ROM without Pain  Vascular: Peripheral pulses palpable 2+ bilaterally    MEDICATIONS:  Home Medications:  acetaminophen 325 mg oral tablet: 2 tab(s) orally every 6 hours, As needed, For Temp greater than 38 C (100.4 F) OR Mild pain (19 Dec 2016 14:54)  docusate sodium 100 mg oral capsule: 1 cap(s) orally 3 times a day (19 Dec 2016 09:38)  folic acid 1 mg oral tablet: 1 tab(s) orally once a day (19 Dec 2016 09:38)  Multiple Vitamins oral tablet: 1 tab(s) orally once a day (19 Dec 2016 09:38)  oxyCODONE 10 mg oral tablet: 1 tab(s) orally every 4 hours, As needed, Severe Pain (19 Dec 2016 14:54)  oxyCODONE 5 mg oral tablet: 1 tab(s) orally every 4 hours, As needed, Moderate Pain (19 Dec 2016 14:54)  polyethylene glycol 3350 oral powder for reconstitution: 17 gram(s) orally once a day (19 Dec 2016 09:38)  rivaroxaban 10 mg oral tablet: 1 tab(s) orally once a day; Continue to take for 6 weeks from 12/16/16 unless otherwise instructed by your surgeon.  (19 Dec 2016 14:54)  senna oral tablet: 2 tab(s) orally once a day (at bedtime), As needed, Constipation (19 Dec 2016 09:38)  valsartan 80 mg oral tablet: 1 tab(s) orally once a day (19 Dec 2016 09:38)      LABS:	 	Labs Personally Reviewed 8/3/2019      CBC Full  -  ( 02 Aug 2019 17:53 )  WBC Count : 8.4 K/uL  Hemoglobin : 10.6 g/dL  Hematocrit : 32.5 %  Platelet Count - Automated : 457 K/uL  Mean Cell Volume : 86.4 fl  Mean Cell Hemoglobin : 28.2 pg  Mean Cell Hemoglobin Concentration : 32.6 gm/dL  Auto Neutrophil # : x  Auto Lymphocyte # : x  Auto Monocyte # : x  Auto Eosinophil # : x  Auto Basophil # : x  Auto Neutrophil % : x  Auto Lymphocyte % : x  Auto Monocyte % : x  Auto Eosinophil % : x  Auto Basophil % : x    08-02    140  |  106  |  20  ----------------------------<  116<H>  4.0   |  22  |  1.02  08-01    139  |  103  |  28<H>  ----------------------------<  104<H>  3.9   |  21<L>  |  1.19    Ca    8.4      02 Aug 2019 04:46  Ca    9.8      01 Aug 2019 22:00  Mg     1.8     08-02    TPro  8.5<H>  /  Alb  4.0  /  TBili  0.6  /  DBili  x   /  AST  19  /  ALT  16  /  AlkPhos  68  08-01      proBNP:   Lipid Profile:   HgA1c:   TSH:     TROPONIN: 8  CARDIAC MARKERS ( 02 Aug 2019 17:53 )  x     / x     / 108 U/L / x     / 2.5 ng/mL        TELEMETRY: 	  SR  ECG:  	SR RBBB, LAFB  RADIOLOGY:  OTHER: 	    CARDIAC TESTING/STUDIES:    [ ] Echocardiogram:  -----------------------------------------------------------------------  Conclusions:  1. Normal mitral valve. Minimal mitral regurgitation.  2. Calcified, possible bicuspid aortic valve. Peak  transaortic valve gradient equals 7 mm Hg, estimated aortic  valve area equals 2 sqcm (by continuity equation),  consistent with mild aortic stenosis. Mild aortic  regurgitation.  3. Proximal septal thickening (proximal septum 1.1cm)  otherwise normal left ventricular internal dimensions and  wall thickness.  4. Hyperdynamic left ventricular systolic function.  Flattening of the interventricular septum in both systole  and diastole is  consistent with right ventricular pressure  overload.  5. Right ventricular enlargement with normal right  ventricular systolic function.  6. Normal pericardium with no pericardial effusion.  *** No previous Echo exam.  ------------------------------------------------------------------------  Confirmed on  8/2/2019 - 21:26:12 by Phillip Jensen M.D.    [ ]  Catheterization:  [ ] Stress Test:  	  	  ASSESSMENT/PLAN: 	  MAICOL PATTEN is a 78y Female patient PMH HTN, LLE DVT, osteoporosis c/b spinal compression fracture, L tibial fracture, R wrist fracture and L femur fracture s/p THR p/w hip pain. Pt states she was in [place] on Tuesday and tripped on [place] and fell on her right hip. Pt states she was ambulating normally w/o a walker prior to falling and is now unable to ambulate. Pt denied lightheadedness/dizziness, chest pain, SOB, syncope, head trauma and LOC. Pt went to the hospital in [place] where she had an XR demonstrating R hip fracture. Pt was prescribed lovenox to take prior to her plane ride home. She came to the ED from the airport and was found to have a hip fx.   This evening she presented for surgical correction of her fx and had 10-12 beats of WCT followed by geminiiny for approx. 1 min during induction.  She had a mild drop in BP at the time but maintained MAP > 70.   Her echocardiogram demonstrates RVE, but preserved RV function and her BPs have been stable. She has an IVC currently being placed and is planned for hip surgery.     ===================  Perioperative  - Patient with no prior cardiac history and can walk > 4 METS. She requires no further cardiac ischemic testing prior to hip surgery.  - With regard to PE, she has an IVC filter placed and may proceed with surgery today without further cardiac testing as she is hemodynamically stable        - Would stop Valsartan  - Would start AC (treatment dose for PE) as soon as safely possibly after hip surgery.  - Will Follow    Perry Darby MD  24/7 cardiology service to follow during week

## 2019-08-04 NOTE — PROVIDER CONTACT NOTE (MEDICATION) - ACTION/TREATMENT ORDERED:
Heparin continued at same rate - 9cc/hr. Bleeding prec maintained, will stop gtt @4am. Will continue to monitor patient.

## 2019-08-04 NOTE — CHART NOTE - NSCHARTNOTEFT_GEN_A_CORE
Interventional Radiology Brief- Operative Note    Pt brought for IVC filter insertion. Initial cavagram performed with CO2 however potential variant anatomy noted. Pt to receive premedication and return for IVC filter insertion with iodinated contrast to better delineate anatomy. Discussed with Pt/ family and Orthoperidc surgery. Pt to receive premedication on return to floor. Filter planned for later this morning.

## 2019-08-04 NOTE — PROGRESS NOTE ADULT - ASSESSMENT
A/P: 78F w/ R FN Fx  Plan for OR today for total hip arthroplasty after IR procedure this morning for IVC filter  Analgesia  heparin gtt on hold for PE  NPO  IVF while NPO  NWB RLE  PT/OT  Encourage incentive spirometry

## 2019-08-04 NOTE — PROGRESS NOTE ADULT - SUBJECTIVE AND OBJECTIVE BOX
Interventional Radiology Brief- Operative Note    Procedure: Retrievable IVC Filter Insertion	    Operators: Dr. Ruiz    Anesthesia (type): Local    Contrast: 67 cc Visi 270    EBL: None    Findings/Follow up Plan of Care: Patent IVC. Prominent left lumbar vein.    Specimens Removed: None    Implants: Retrievable IVC Filter Insertion    Complications: None    Condition/Disposition: Stable/Floors    Please call Interventional Radiology [phone] with any questions, concerns, or issues. Interventional Radiology Brief- Operative Note    Procedure: Retrievable IVC Filter Insertion	    Operators: Dr. Ruiz    Anesthesia (type): Local    Contrast: 67 cc Visi 270    EBL: None    Findings/Follow up Plan of Care: Successful insertion of an optional retrievable IVC filter. Variant anatomy better delineate with contrast exam. Case discussed with Dr. Abdullahi and Orthopedic Surgery. Plan for retrieval when cleared by Ortho and stable on anticoagulation.    Specimens Removed: None    Implants: Option IVC Filter Insertion    Complications: None    Condition/Disposition: Stable/Floors    Please call Interventional Radiology x [phone] with any questions, concerns, or issues.

## 2019-08-04 NOTE — PROVIDER CONTACT NOTE (MEDICATION) - ASSESSMENT
No s/s of distress or bleeding noted. Pt started heparin gtt - patient specific nomogram @ 9cc/hr. Initial PTT after 6hrs is 51.5, goal 58-99 as per order.

## 2019-08-04 NOTE — PROGRESS NOTE ADULT - SUBJECTIVE AND OBJECTIVE BOX
Patient is a 78y old  Female who presents with a chief complaint of Left femoral neck fracture.  The patient  was in good health on vacation in [place].  She was walking on [address] and tripped and lost her balance and landed on her right hip.  She was told of the fracture and within three days, she has been able to return home to [place].  The x-rays have confirmed a right femoral neck fracture that requires ORIF.  The patient is in pain secondary to the fracture, but otherwise she had no significant complaints. The patient  went to the operating room  for surgical repair of her hip-fracture August 2, 2019 and the procedure  was  aborted.  Prior to induction on cardiac monitoring, she was found to have a 6 beat run of wide-complex tachycardia.  She also had frequent VPCs.  The patient was seen by cardiology that evening, and cardiac enzymes were obtained and negative.  The patient was sent for a Doppler study of the leg which showed no evidence for DVT bilaterally.  She then had a CT angiogram of the lungs which shows positive pulmonary emboli in the right upper and right lower lobes.  It is likely that the thrombus traveled to the lung, with movement prior to surgery and caused hypoxemia with sudden onset of cardiac arrhythmia.  Cardiology to advise if  an arrhythmic agent should be started.  The patient continues on telemetry.  She has had no further beats of ventricular tachycardia.  The patient still requires surgical intervention for her fractured hip and is now receiving full dose IV heparin. The patient is rescheduled for ORIF 8/4/19 and is scheduled to have a temporary inferior vena cava filter placed before the surgical procedure.    MEDICATIONS  (STANDING):  acetaminophen   Tablet .. 975 milliGRAM(s) Oral every 8 hours  famotidine    Tablet 20 milliGRAM(s) Oral two times a day  sodium chloride 0.9%. 1000 milliLiter(s) (100 mL/Hr) IV Continuous <Continuous>  valsartan 80 milliGRAM(s) Oral daily    MEDICATIONS  (PRN):  ondansetron Injectable 4 milliGRAM(s) IV Push every 6 hours PRN Nausea and/or Vomiting  oxyCODONE    IR 5 milliGRAM(s) Oral every 6 hours PRN Severe Pain (7 - 10)  oxyCODONE    IR 2.5 milliGRAM(s) Oral every 4 hours PRN Moderate Pain (4 - 6)  traMADol 25 milliGRAM(s) Oral every 4 hours PRN Mild Pain (1 - 3)          Allergies    iodine (Unknown)      Vital Signs Last 24 Hrs  T(C): 36.7 (04 Aug 2019 12:19), Max: 36.8 (04 Aug 2019 10:21)  T(F): 98.1 (04 Aug 2019 12:19), Max: 98.3 (04 Aug 2019 10:21)  HR: 79 (04 Aug 2019 12:19) (79 - 104)  BP: 138/78 (04 Aug 2019 12:19) (123/76 - 138/78)  BP(mean): --  RR: 19 (04 Aug 2019 12:19) (18 - 19)  SpO2: 95% (04 Aug 2019 12:19) (95% - 97%)      PHYSICAL EXAM:  GENERAL: NAD, well nourished and conversant  HEAD:  Atraumatic  EYES: EOM, PERRLA, conjunctiva pink and sclera white  ENT: No tonsillar erythema, exudates, or enlargement, moist mucous membranes, good dentition, no lesions  NECK: Supple, No JVD, normal thyroid, carotids with normal upstrokes and no bruits  CHEST/LUNG: Clear to auscultation bilaterally, No rales, rhonchi, wheezing, or rubs  HEART: Regular rate and rhythm, No murmurs, rubs, or gallops  ABDOMEN: Soft, nondistended, no masses, guarding, tenderness or rebound, bowel sounds present  Ext:    LABS    CBC Full  -  ( 04 Aug 2019 05:40 )  WBC Count : 8.6 K/uL  RBC Count : 3.83 M/uL  Hemoglobin : 10.7 g/dL  Hematocrit : 32.8 %  Platelet Count - Automated : 474 K/uL  Mean Cell Volume : 85.8 fl  Mean Cell Hemoglobin : 27.9 pg  Mean Cell Hemoglobin Concentration : 32.5 gm/dL  Auto Neutrophil # : x  Auto Lymphocyte # : x  Auto Monocyte # : x  Auto Eosinophil # : x  Auto Basophil # : x  Auto Neutrophil % : x  Auto Lymphocyte % : x  Auto Monocyte % : x  Auto Eosinophil % : x  Auto Basophil % : x    08-04    138  |  106  |  28<H>  ----------------------------<  102<H>  4.0   |  21<L>  |  1.37<H>    Ca    9.1      04 Aug 2019 05:40  Phos  2.9     08-03  Mg     1.8     08-03        PT/INR - ( 04 Aug 2019 05:40 )   PT: 11.3 sec;   INR: 0.99 ratio         PTT - ( 04 Aug 2019 05:40 )  PTT:28.3 sec  EXTREMITIES:  2+ Peripheral Pulses, No clubbing, cyanosis, or edema. No arthritis of shoulders, elbows, hands, hips, knees, ankles, or feet. No DJD C spine, T spine, or L/S spine  LYMPH: No lymphadenopathy noted  SKIN: No rashes or lesions  NERVOUS SYSTEM:  Alert & Oriented X3, normal cognitive function. Motor Strength 5/5 right upper and right lower.  5/5 left upper and left lower extremities, DTRs 2+ intact and symmetric    LABS:        PROCEDURE:   CT Angiography of the Chest.  66 ml of Omnipaque 350 was injected intravenously. 34 ml were discarded.  Sagittal and coronal reformats were performed as well as 3D (MIP)   reconstructions.      FINDINGS:    LUNGS AND AIRWAYS: Patent central airways.  A cluster of small nodules   measuring up to 3 mm in the right upper lobe have the appearance of   distal impacted airways.    PLEURA: No pleural effusion.    MEDIASTINUM AND ROZ: No lymphadenopathy.    VESSELS: Acute pulmonary emboli within subsegmental vessels of the right   upper and right lower lobe. Ascending aorta measures up to 4.2 cm.   Coronary calcification.    HEART: Heart size is normal. No pericardial effusion.    CHEST WALL AND LOWER NECK: Within normal limits.    VISUALIZED UPPER ABDOMEN: Within normal limits.    BONES: Degenerative changes. Old rib fractures. Chronic severe   compression fracture of T6.    IMPRESSION:     Acute pulmonary emboli involving subsegmental vessels of the right upper   and right lower lobe.    Findings were discussed with Dr. Dubon  8/3/2019 1:32 PM by Dr. King   with read back confirmation. Patient is a 78y old  Female who presents with a chief complaint of Right femoral neck fracture.  The patient  was in good health on vacation in [place].  She was walking on [address] and tripped and lost her balance and landed on her right hip.  She was told of the fracture and within three days, she has been able to return home to [place].  The x-rays have confirmed a right femoral neck fracture that requires ORIF.  The patient is in pain secondary to the fracture, but otherwise she had no significant complaints. The patient  went to the operating room  for surgical repair of her hip-fracture August 2, 2019 and the procedure  was  aborted.  Prior to induction on cardiac monitoring, she was found to have a 6 beat run of wide-complex tachycardia.  She also had frequent VPCs.  The patient was seen by cardiology that evening, and cardiac enzymes were obtained and negative.  The patient was sent for a Doppler study of the leg which showed no evidence for DVT bilaterally.  She then had a CT angiogram of the lungs which shows positive pulmonary emboli in the right upper and right lower lobes.  It is likely that the thrombus traveled to the lung, with movement prior to surgery and caused hypoxemia with sudden onset of cardiac arrhythmia.  Cardiology to advise if  an arrhythmic agent should be started.  The patient continues on telemetry.  She has had no further beats of ventricular tachycardia.  The patient still requires surgical intervention for her fractured hip and is now receiving full dose IV heparin. The patient is rescheduled for ORIF 8/4/19 and is scheduled to have a temporary inferior vena cava filter placed before the surgical procedure.    MEDICATIONS  (STANDING):  acetaminophen   Tablet .. 975 milliGRAM(s) Oral every 8 hours  famotidine    Tablet 20 milliGRAM(s) Oral two times a day  sodium chloride 0.9%. 1000 milliLiter(s) (100 mL/Hr) IV Continuous <Continuous>  valsartan 80 milliGRAM(s) Oral daily    MEDICATIONS  (PRN):  ondansetron Injectable 4 milliGRAM(s) IV Push every 6 hours PRN Nausea and/or Vomiting  oxyCODONE    IR 5 milliGRAM(s) Oral every 6 hours PRN Severe Pain (7 - 10)  oxyCODONE    IR 2.5 milliGRAM(s) Oral every 4 hours PRN Moderate Pain (4 - 6)  traMADol 25 milliGRAM(s) Oral every 4 hours PRN Mild Pain (1 - 3)      Allergies    iodine (Unknown)      Vital Signs Last 24 Hrs  T(C): 36.7 (04 Aug 2019 12:19), Max: 36.8 (04 Aug 2019 10:21)  T(F): 98.1 (04 Aug 2019 12:19), Max: 98.3 (04 Aug 2019 10:21)  HR: 79 (04 Aug 2019 12:19) (79 - 104)  BP: 138/78 (04 Aug 2019 12:19) (123/76 - 138/78)  BP(mean): --  RR: 19 (04 Aug 2019 12:19) (18 - 19)  SpO2: 95% (04 Aug 2019 12:19) (95% - 97%)      PHYSICAL EXAM:  GENERAL: NAD, well nourished and conversant  HEAD:  Atraumatic  EYES: EOM, PERRLA, conjunctiva pink and sclera white  ENT: No tonsillar erythema, exudates, or enlargement, moist mucous membranes, good dentition, no lesions  NECK: Supple, No JVD, normal thyroid, carotids with normal upstrokes and no bruits  CHEST/LUNG: Clear to auscultation bilaterally, No rales, rhonchi, wheezing, or rubs  HEART: Regular rate and rhythm, No murmurs, rubs, or gallops  ABDOMEN: Soft, nondistended, no masses, guarding, tenderness or rebound, bowel sounds present  Ext:    LABS    CBC Full  -  ( 04 Aug 2019 05:40 )  WBC Count : 8.6 K/uL  RBC Count : 3.83 M/uL  Hemoglobin : 10.7 g/dL  Hematocrit : 32.8 %  Platelet Count - Automated : 474 K/uL  Mean Cell Volume : 85.8 fl  Mean Cell Hemoglobin : 27.9 pg  Mean Cell Hemoglobin Concentration : 32.5 gm/dL  Auto Neutrophil # : x  Auto Lymphocyte # : x  Auto Monocyte # : x  Auto Eosinophil # : x  Auto Basophil # : x  Auto Neutrophil % : x  Auto Lymphocyte % : x  Auto Monocyte % : x  Auto Eosinophil % : x  Auto Basophil % : x    08-04    138  |  106  |  28<H>  ----------------------------<  102<H>  4.0   |  21<L>  |  1.37<H>    Ca    9.1      04 Aug 2019 05:40  Phos  2.9     08-03  Mg     1.8     08-03        PT/INR - ( 04 Aug 2019 05:40 )   PT: 11.3 sec;   INR: 0.99 ratio         PTT - ( 04 Aug 2019 05:40 )  PTT:28.3 sec  EXTREMITIES:  2+ Peripheral Pulses, No clubbing, cyanosis, or edema. No arthritis of shoulders, elbows, hands, hips, knees, ankles, or feet. No DJD C spine, T spine, or L/S spine  LYMPH: No lymphadenopathy noted  SKIN: No rashes or lesions  NERVOUS SYSTEM:  Alert & Oriented X3, normal cognitive function. Motor Strength 5/5 right upper and right lower.  5/5 left upper and left lower extremities, DTRs 2+ intact and symmetric    LABS:        PROCEDURE:   CT Angiography of the Chest.  66 ml of Omnipaque 350 was injected intravenously. 34 ml were discarded.  Sagittal and coronal reformats were performed as well as 3D (MIP)   reconstructions.      FINDINGS:    LUNGS AND AIRWAYS: Patent central airways.  A cluster of small nodules   measuring up to 3 mm in the right upper lobe have the appearance of   distal impacted airways.    PLEURA: No pleural effusion.    MEDIASTINUM AND ROZ: No lymphadenopathy.    VESSELS: Acute pulmonary emboli within subsegmental vessels of the right   upper and right lower lobe. Ascending aorta measures up to 4.2 cm.   Coronary calcification.    HEART: Heart size is normal. No pericardial effusion.    CHEST WALL AND LOWER NECK: Within normal limits.    VISUALIZED UPPER ABDOMEN: Within normal limits.    BONES: Degenerative changes. Old rib fractures. Chronic severe   compression fracture of T6.    IMPRESSION:     Acute pulmonary emboli involving subsegmental vessels of the right upper   and right lower lobe.    Findings were discussed with Dr. Dubon  8/3/2019 1:32 PM by Dr. King   with read back confirmation. Patient is a 78y old  Female who presents with a chief complaint of Right femoral neck fracture.  The patient  was in good health on vacation in [place].  She was walking on [address] and tripped and lost her balance and landed on her right hip.  She was told of the fracture and within three days, she has been able to return home to [place].  The x-rays have confirmed a right femoral neck fracture that requires ORIF.  The patient is in pain secondary to the fracture, but otherwise she had no significant complaints. The patient  went to the operating room  for surgical repair of her hip-fracture August 2, 2019 and the procedure  was  aborted.  Prior to induction on cardiac monitoring, she was found to have a 6 beat run of wide-complex tachycardia.  She also had frequent VPCs.  The patient was seen by cardiology that evening, and cardiac enzymes were obtained and negative.  The patient was sent for a Doppler study of the leg which showed no evidence for DVT bilaterally.  She then had a CT angiogram of the lungs which shows positive pulmonary emboli in the right upper and right lower lobes.  It is likely that the thrombus traveled to the lung, with movement prior to surgery and caused hypoxemia with sudden onset of cardiac arrhythmia.  Cardiology findings no other active problems or ischemia.  No additional testing or cardiac treatments to be rendered at this time and the patient has received cardiology clearance for surgery. The patient continues on telemetry.  She has had no further beats of ventricular tachycardia.  The patient still requires surgical intervention for her fractured hip and is now receiving full dose IV heparin, with no fall in hematocrit. The patient is  scheduled to have a temporary inferior vena cava filter placed 08/04/19 before the surgical procedure and is receiving a steroid prep, because of an iodine dye allergy, prior to the procedure. Right hip ORIF has been delayed until filter is placed and surgery rescheduled electively for 8/6/19.     MEDICATIONS  (STANDING):  acetaminophen   Tablet .. 975 milliGRAM(s) Oral every 8 hours  famotidine    Tablet 20 milliGRAM(s) Oral two times a day  sodium chloride 0.9%. 1000 milliLiter(s) (100 mL/Hr) IV Continuous <Continuous>  valsartan 80 milliGRAM(s) Oral daily    MEDICATIONS  (PRN):  ondansetron Injectable 4 milliGRAM(s) IV Push every 6 hours PRN Nausea and/or Vomiting  oxyCODONE    IR 5 milliGRAM(s) Oral every 6 hours PRN Severe Pain (7 - 10)  oxyCODONE    IR 2.5 milliGRAM(s) Oral every 4 hours PRN Moderate Pain (4 - 6)  traMADol 25 milliGRAM(s) Oral every 4 hours PRN Mild Pain (1 - 3)      Allergies    iodine (Unknown)      Vital Signs Last 24 Hrs  T(C): 36.7 (04 Aug 2019 12:19), Max: 36.8 (04 Aug 2019 10:21)  T(F): 98.1 (04 Aug 2019 12:19), Max: 98.3 (04 Aug 2019 10:21)  HR: 79 (04 Aug 2019 12:19) (79 - 104)  BP: 138/78 (04 Aug 2019 12:19) (123/76 - 138/78)  BP(mean): --  RR: 19 (04 Aug 2019 12:19) (18 - 19)  SpO2: 95% (04 Aug 2019 12:19) (95% - 97%)      PHYSICAL EXAM:  GENERAL: NAD, well nourished and conversant  HEAD:  Atraumatic  EYES: EOM, PERRLA, conjunctiva pink and sclera white  ENT: No tonsillar erythema, exudates, or enlargement, moist mucous membranes, good dentition, no lesions  NECK: Supple, No JVD, normal thyroid, carotids with normal upstrokes and no bruits  CHEST/LUNG: Clear to auscultation bilaterally, No rales, rhonchi, wheezing, or rubs  HEART: Regular rate and rhythm, No murmurs, rubs, or gallops  ABDOMEN: Soft, nondistended, no masses, guarding, tenderness or rebound, bowel sounds present  Ext:    LABS    CBC Full  -  ( 04 Aug 2019 05:40 )  WBC Count : 8.6 K/uL  RBC Count : 3.83 M/uL  Hemoglobin : 10.7 g/dL  Hematocrit : 32.8 %  Platelet Count - Automated : 474 K/uL  Mean Cell Volume : 85.8 fl  Mean Cell Hemoglobin : 27.9 pg  Mean Cell Hemoglobin Concentration : 32.5 gm/dL  Auto Neutrophil # : x  Auto Lymphocyte # : x  Auto Monocyte # : x  Auto Eosinophil # : x  Auto Basophil # : x  Auto Neutrophil % : x  Auto Lymphocyte % : x  Auto Monocyte % : x  Auto Eosinophil % : x  Auto Basophil % : x    08-04    138  |  106  |  28<H>  ----------------------------<  102<H>  4.0   |  21<L>  |  1.37<H>    Ca    9.1      04 Aug 2019 05:40  Phos  2.9     08-03  Mg     1.8     08-03        PT/INR - ( 04 Aug 2019 05:40 )   PT: 11.3 sec;   INR: 0.99 ratio         PTT - ( 04 Aug 2019 05:40 )  PTT:28.3 sec  EXTREMITIES:  2+ Peripheral Pulses, No clubbing, cyanosis, or edema. No arthritis of shoulders, elbows, hands, hips, knees, ankles, or feet. No DJD C spine, T spine, or L/S spine  LYMPH: No lymphadenopathy noted  SKIN: No rashes or lesions  NERVOUS SYSTEM:  Alert & Oriented X3, normal cognitive function. Motor Strength 5/5 right upper and right lower.  5/5 left upper and left lower extremities, DTRs 2+ intact and symmetric    LABS:        PROCEDURE:   CT Angiography of the Chest.  66 ml of Omnipaque 350 was injected intravenously. 34 ml were discarded.  Sagittal and coronal reformats were performed as well as 3D (MIP)   reconstructions.      FINDINGS:    LUNGS AND AIRWAYS: Patent central airways.  A cluster of small nodules   measuring up to 3 mm in the right upper lobe have the appearance of   distal impacted airways.    PLEURA: No pleural effusion.    MEDIASTINUM AND ROZ: No lymphadenopathy.    VESSELS: Acute pulmonary emboli within subsegmental vessels of the right   upper and right lower lobe. Ascending aorta measures up to 4.2 cm.   Coronary calcification.    HEART: Heart size is normal. No pericardial effusion.    CHEST WALL AND LOWER NECK: Within normal limits.    VISUALIZED UPPER ABDOMEN: Within normal limits.    BONES: Degenerative changes. Old rib fractures. Chronic severe   compression fracture of T6.    IMPRESSION:     Acute pulmonary emboli involving subsegmental vessels of the right upper   and right lower lobe.    Findings were discussed with Dr. Dubon  8/3/2019 1:32 PM by Dr. King   with read back confirmation.

## 2019-08-04 NOTE — PROVIDER CONTACT NOTE (MEDICATION) - RECOMMENDATIONS
Keep heparin gtt at 9cc/hr 2/2 IR procedure for IVC filter this AM. Stop heparin gtt at 4am as previously ordered.

## 2019-08-04 NOTE — PROGRESS NOTE ADULT - SUBJECTIVE AND OBJECTIVE BOX
Orthopedic Surgery Progress Note    Patient seen and examined this morning. No acute events overnight. Pain is well controlled. heparin gtt held at 4am. Plan for IR procedure this morning for IVC filter followed by OR for hip fracture    O:  Vital Signs Last 24 Hrs  T(C): 36.6 (04 Aug 2019 04:24), Max: 36.6 (03 Aug 2019 21:25)  T(F): 97.8 (04 Aug 2019 04:24), Max: 97.9 (03 Aug 2019 21:25)  HR: 86 (04 Aug 2019 04:24) (86 - 104)  BP: 123/76 (04 Aug 2019 04:24) (123/76 - 143/80)  BP(mean): --  RR: 18 (04 Aug 2019 04:24) (18 - 18)  SpO2: 97% (04 Aug 2019 04:24) (96% - 97%)    Gen: NAD  RLE  EHL/FHL/TA/GS intact  SILT DP/SP/FOREMAN/Sa  WWP distally    Labs:                        10.7   8.6   )-----------( 474      ( 04 Aug 2019 05:40 )             32.8                         11.7   6.7   )-----------( 508      ( 04 Aug 2019 01:45 )             36.7       08-04    138  |  106  |  28<H>  ----------------------------<  102<H>  4.0   |  21<L>  |  1.37<H>        PT/INR - ( 04 Aug 2019 05:40 )   PT: 11.3 sec;   INR: 0.99 ratio         PTT - ( 04 Aug 2019 05:40 )  PTT:28.3 sec

## 2019-08-04 NOTE — PROGRESS NOTE ADULT - ATTENDING COMMENTS
The patient is medically stable, medically optimized and has no medical contraindication to surgery tomorrow as required. Exam time 40 minutes including > than 50 % for bedside discussion and counseling. The patient is medically stable, medically optimized and has no medical contraindication to surgery after temporary IVC filter has been placed. Exam time 40 minutes including > than 50 % for bedside discussion and counseling.

## 2019-08-05 ENCOUNTER — TRANSCRIPTION ENCOUNTER (OUTPATIENT)
Age: 79
End: 2019-08-05

## 2019-08-05 LAB
APTT BLD: 52.9 SEC — HIGH (ref 27.5–36.3)
APTT BLD: 74.3 SEC — HIGH (ref 27.5–36.3)
HCT VFR BLD CALC: 29.9 % — LOW (ref 34.5–45)
HGB BLD-MCNC: 10.5 G/DL — LOW (ref 11.5–15.5)
MCHC RBC-ENTMCNC: 29.7 PG — SIGNIFICANT CHANGE UP (ref 27–34)
MCHC RBC-ENTMCNC: 35 GM/DL — SIGNIFICANT CHANGE UP (ref 32–36)
MCV RBC AUTO: 85 FL — SIGNIFICANT CHANGE UP (ref 80–100)
PLATELET # BLD AUTO: 443 K/UL — HIGH (ref 150–400)
RBC # BLD: 3.52 M/UL — LOW (ref 3.8–5.2)
RBC # FLD: 14.1 % — SIGNIFICANT CHANGE UP (ref 10.3–14.5)
WBC # BLD: 11.1 K/UL — HIGH (ref 3.8–10.5)
WBC # FLD AUTO: 11.1 K/UL — HIGH (ref 3.8–10.5)

## 2019-08-05 PROCEDURE — 99233 SBSQ HOSP IP/OBS HIGH 50: CPT

## 2019-08-05 PROCEDURE — 99231 SBSQ HOSP IP/OBS SF/LOW 25: CPT

## 2019-08-05 RX ORDER — HEPARIN SODIUM 5000 [USP'U]/ML
INJECTION INTRAVENOUS; SUBCUTANEOUS
Qty: 25000 | Refills: 0 | Status: DISCONTINUED | OUTPATIENT
Start: 2019-08-05 | End: 2019-08-06

## 2019-08-05 RX ORDER — SODIUM CHLORIDE 9 MG/ML
1000 INJECTION, SOLUTION INTRAVENOUS
Refills: 0 | Status: DISCONTINUED | OUTPATIENT
Start: 2019-08-05 | End: 2019-08-06

## 2019-08-05 RX ORDER — HEPARIN SODIUM 5000 [USP'U]/ML
4000 INJECTION INTRAVENOUS; SUBCUTANEOUS EVERY 6 HOURS
Refills: 0 | Status: DISCONTINUED | OUTPATIENT
Start: 2019-08-05 | End: 2019-08-06

## 2019-08-05 RX ORDER — HEPARIN SODIUM 5000 [USP'U]/ML
4000 INJECTION INTRAVENOUS; SUBCUTANEOUS ONCE
Refills: 0 | Status: COMPLETED | OUTPATIENT
Start: 2019-08-05 | End: 2019-08-05

## 2019-08-05 RX ORDER — HEPARIN SODIUM 5000 [USP'U]/ML
2000 INJECTION INTRAVENOUS; SUBCUTANEOUS EVERY 6 HOURS
Refills: 0 | Status: DISCONTINUED | OUTPATIENT
Start: 2019-08-05 | End: 2019-08-06

## 2019-08-05 RX ADMIN — HEPARIN SODIUM 1100 UNIT(S)/HR: 5000 INJECTION INTRAVENOUS; SUBCUTANEOUS at 18:07

## 2019-08-05 RX ADMIN — Medication 975 MILLIGRAM(S): at 05:52

## 2019-08-05 RX ADMIN — Medication 975 MILLIGRAM(S): at 14:22

## 2019-08-05 RX ADMIN — Medication 975 MILLIGRAM(S): at 21:20

## 2019-08-05 RX ADMIN — Medication 975 MILLIGRAM(S): at 05:50

## 2019-08-05 RX ADMIN — HEPARIN SODIUM 1000 UNIT(S)/HR: 5000 INJECTION INTRAVENOUS; SUBCUTANEOUS at 03:24

## 2019-08-05 RX ADMIN — FAMOTIDINE 20 MILLIGRAM(S): 10 INJECTION INTRAVENOUS at 17:11

## 2019-08-05 RX ADMIN — HEPARIN SODIUM 2000 UNIT(S): 5000 INJECTION INTRAVENOUS; SUBCUTANEOUS at 11:28

## 2019-08-05 RX ADMIN — Medication 975 MILLIGRAM(S): at 13:22

## 2019-08-05 RX ADMIN — HEPARIN SODIUM 4000 UNIT(S): 5000 INJECTION INTRAVENOUS; SUBCUTANEOUS at 03:24

## 2019-08-05 RX ADMIN — VALSARTAN 80 MILLIGRAM(S): 80 TABLET ORAL at 05:51

## 2019-08-05 RX ADMIN — HEPARIN SODIUM 1100 UNIT(S)/HR: 5000 INJECTION INTRAVENOUS; SUBCUTANEOUS at 11:27

## 2019-08-05 RX ADMIN — FAMOTIDINE 20 MILLIGRAM(S): 10 INJECTION INTRAVENOUS at 05:50

## 2019-08-05 NOTE — PROGRESS NOTE ADULT - ASSESSMENT
The patient  went to the operating room  for surgical repair of her hip-fracture August 2, 2019 and the procedure  was  aborted.  Prior to induction on cardiac monitoring, she was found to have a 6 beat run of wide-complex tachycardia.  She also had frequent VPCs.  The patient was seen by cardiology that evening, and cardiac enzymes were obtained and negative.  The patient was sent for a Doppler study of the leg which showed no evidence for DVT bilaterally.  She then had a CT angiogram of the lungs which shows positive pulmonary emboli in the right upper and right lower lobes.  Patient currently rate controlled. Denies any shortness of breath. Leg pain has been controlled with pain meds. Patient is awaiting surgical intervention

## 2019-08-05 NOTE — PROGRESS NOTE ADULT - ATTENDING COMMENTS
The patient is medically stable, medically optimized and has no medical contraindication to surgery after temporary IVC filter has been placed. Exam time 40 minutes including > than 50 % for bedside discussion and counseling.

## 2019-08-05 NOTE — PROGRESS NOTE ADULT - SUBJECTIVE AND OBJECTIVE BOX
Patient seen and examined. Pain controlled. No acute events overnight. Patient received IVCF yesterday.     Vital Signs Last 24 Hrs  T(C): 36.7 (08-05-19 @ 05:22), Max: 36.8 (08-04-19 @ 10:21)  T(F): 98 (08-05-19 @ 05:22), Max: 98.3 (08-04-19 @ 10:21)  HR: 81 (08-05-19 @ 05:22) (72 - 86)  BP: 142/85 (08-05-19 @ 05:22) (112/70 - 150/92)  BP(mean): --  RR: 17 (08-05-19 @ 05:22) (17 - 19)  SpO2: 97% (08-05-19 @ 05:22) (95% - 97%)    Physical Exam  Gen: NAD  RLE:   skin c/d/i  +EHL/FHL/Gsc/TA  SILT L3-S1  DP +  Compartments soft  No calf ttp    A/P: 78y Female withr ight femoral neck fx   plan for OR tomorrow, 8/6/19  NPO after midnight  hold heparin drop at 3am   IVF while NPO   Pain control  DVT ppx  PT/OT, NWB RLE   FU labs  Dispo planning  D/w attending

## 2019-08-05 NOTE — PROGRESS NOTE ADULT - SUBJECTIVE AND OBJECTIVE BOX
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      Denies abdominal pain/neck pain.     Vitals: T(F): 98 (08-05-19 @ 05:22), Max: 98.3 (08-04-19 @ 10:21)  HR: 81 (08-05-19 @ 05:22) (72 - 86)  BP: 142/85 (08-05-19 @ 05:22) (112/70 - 150/92)  RR: 17 (08-05-19 @ 05:22) (17 - 19)  SpO2: 97% (08-05-19 @ 05:22) (95% - 97%)  Wt(kg): --    LABS:                        10.7   8.6   )-----------( 474      ( 04 Aug 2019 05:40 )             32.8     08-04    138  |  106  |  28<H>  ----------------------------<  102<H>  4.0   |  21<L>  |  1.37<H>    Ca    9.1      04 Aug 2019 05:40      PT/INR - ( 04 Aug 2019 05:40 )   PT: 11.3 sec;   INR: 0.99 ratio         PTT - ( 04 Aug 2019 05:40 )  PTT:28.3 sec    PHYSICAL EXAM:  General: Nontoxic, in NAD  Neuro:  Alert & oriented x 3  Abdomen: soft, NTND.  right neck: dressing removed. No hematoma noted.     Impression: 78y Female s/p IVC filter placement for prior to Rt hip ORIF.     Plan:  - Plan for removal of the filter within 3 months.   - D/w dr. Ruiz.      Please call IR at extension [phone] with any questions, concerns, or issues regarding above. 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      Denies abdominal pain/neck pain.     Vitals: T(F): 98 (08-05-19 @ 05:22), Max: 98.3 (08-04-19 @ 10:21)  HR: 81 (08-05-19 @ 05:22) (72 - 86)  BP: 142/85 (08-05-19 @ 05:22) (112/70 - 150/92)  RR: 17 (08-05-19 @ 05:22) (17 - 19)  SpO2: 97% (08-05-19 @ 05:22) (95% - 97%)  Wt(kg): --    LABS:                        10.7   8.6   )-----------( 474      ( 04 Aug 2019 05:40 )             32.8     08-04    138  |  106  |  28<H>  ----------------------------<  102<H>  4.0   |  21<L>  |  1.37<H>    Ca    9.1      04 Aug 2019 05:40      PT/INR - ( 04 Aug 2019 05:40 )   PT: 11.3 sec;   INR: 0.99 ratio         PTT - ( 04 Aug 2019 05:40 )  PTT:28.3 sec    PHYSICAL EXAM:  General: Nontoxic, in NAD  Neuro:  Alert & oriented x 3  Abdomen: soft, NTND.  right neck: dressing removed. No hematoma noted.     Impression: 78y Female s/p IVC filter placement for prior to Rt hip ORIF.     Plan:  - Plan for removal of the filter when a/c can be resumed, ideally within 3 months.   - D/w dr. Ruiz.      Please call IR at extension [phone] with any questions, concerns, or issues regarding above.

## 2019-08-05 NOTE — PROGRESS NOTE ADULT - SUBJECTIVE AND OBJECTIVE BOX
SUBJ:    MEDICATIONS  (STANDING):  acetaminophen   Tablet .. 975 milliGRAM(s) Oral every 8 hours  famotidine    Tablet 20 milliGRAM(s) Oral two times a day  heparin  Infusion.  Unit(s)/Hr (10 mL/Hr) IV Continuous <Continuous>  lactated ringers. 1000 milliLiter(s) (75 mL/Hr) IV Continuous <Continuous>  sodium chloride 0.9%. 1000 milliLiter(s) (100 mL/Hr) IV Continuous <Continuous>  valsartan 80 milliGRAM(s) Oral daily    MEDICATIONS  (PRN):  heparin  Injectable 4000 Unit(s) IV Push every 6 hours PRN For aPTT less than 40  heparin  Injectable 2000 Unit(s) IV Push every 6 hours PRN For aPTT between 40 - 57  ondansetron Injectable 4 milliGRAM(s) IV Push every 6 hours PRN Nausea and/or Vomiting  oxyCODONE    IR 5 milliGRAM(s) Oral every 6 hours PRN Severe Pain (7 - 10)  oxyCODONE    IR 2.5 milliGRAM(s) Oral every 4 hours PRN Moderate Pain (4 - 6)  traMADol 25 milliGRAM(s) Oral every 4 hours PRN Mild Pain (1 - 3)      Vital Signs Last 24 Hrs  T(C): 36.9 (05 Aug 2019 11:53), Max: 36.9 (05 Aug 2019 11:53)  T(F): 98.4 (05 Aug 2019 11:53), Max: 98.4 (05 Aug 2019 11:53)  HR: 71 (05 Aug 2019 11:53) (71 - 86)  BP: 153/79 (05 Aug 2019 11:53) (112/70 - 153/79)  BP(mean): --  RR: 18 (05 Aug 2019 11:53) (17 - 19)  SpO2: 96% (05 Aug 2019 11:53) (96% - 97%)    REVIEW OF SYSTEMS:  As per HPI, otherwise unremarkable.     PHYSICAL EXAM:  · CONSTITUTIONAL: Well-developed, well nourished      · EYES: EOMI; PERRL; no drainage or redness  · NECK: No bruits; no thyromegaly or nodules  ·RESPIRATORY:   airway patent; breath sounds equal; good air movement; respirations non-labored; clear to auscultation bilaterally; no chest wall tenderness; no intercostal retractions; no rales,rhonchi or wheeze  · CARDIOVASCULAR: regular rate and rhythm  no rub  no murmur  normal PMI  . GASTROINTESTINAL:  no distention; no masses palpable; bowel sounds normal  · EXTREMITIES: No cyanosis, clubbing or edema  · VASCULAR:  Equal and normal pulses (radial, femoral)  ·NEUROLOGICAL:  Alert and oriented x 3; sensation intact; deep reflexes intact; cranial nerves intact; no spontaneous movement; superficial reflexes intact; normal strength  · SKIN: No lesions; no rash  . LYMPH NODES: No lymphadedenopathy  · MUSCULOSKELETAL:  No calf tenderness  no joint swelling	    TELEMETRY:    ECG:    TTE:    LABS:   CBC Full  -  ( 05 Aug 2019 10:16 )  WBC Count : 11.1 K/uL  RBC Count : 3.52 M/uL  Hemoglobin : 10.5 g/dL  Hematocrit : 29.9 %  Platelet Count - Automated : 443 K/uL  Mean Cell Volume : 85.0 fl  Mean Cell Hemoglobin : 29.7 pg  Mean Cell Hemoglobin Concentration : 35.0 gm/dL    08-04    138  |  106  |  28<H>  ----------------------------<  102<H>  4.0   |  21<L>  |  1.37<H>    Ca    9.1      04 Aug 2019 05:40    RADIOLOGY & ADDITIONAL STUDIES:    IMPRESSION AND PLAN:        Yas Sorenson MD, MPH, BENTLEY  Cardiovascular Specialist Attending  [facility]  C: [phone]  E: [email or web address]  (Cardiology nocturnist available every night 7 pm - 7 am; available week days for follow-up; general cardiology consult coverage weekend days) SUBJ:  No acute events. PE identified with RV strain/dysfunction noted on ECHO. IVC filter placed. Planned for OR tomorrow 8/6/2019 for surgical intervention of hip.     MEDICATIONS  (STANDING):  acetaminophen   Tablet .. 975 milliGRAM(s) Oral every 8 hours  famotidine    Tablet 20 milliGRAM(s) Oral two times a day  heparin  Infusion.  Unit(s)/Hr (10 mL/Hr) IV Continuous <Continuous>  lactated ringers. 1000 milliLiter(s) (75 mL/Hr) IV Continuous <Continuous>  sodium chloride 0.9%. 1000 milliLiter(s) (100 mL/Hr) IV Continuous <Continuous>  valsartan 80 milliGRAM(s) Oral daily    MEDICATIONS  (PRN):  heparin  Injectable 4000 Unit(s) IV Push every 6 hours PRN For aPTT less than 40  heparin  Injectable 2000 Unit(s) IV Push every 6 hours PRN For aPTT between 40 - 57  ondansetron Injectable 4 milliGRAM(s) IV Push every 6 hours PRN Nausea and/or Vomiting  oxyCODONE    IR 5 milliGRAM(s) Oral every 6 hours PRN Severe Pain (7 - 10)  oxyCODONE    IR 2.5 milliGRAM(s) Oral every 4 hours PRN Moderate Pain (4 - 6)  traMADol 25 milliGRAM(s) Oral every 4 hours PRN Mild Pain (1 - 3)    Vital Signs Last 24 Hrs  T(C): 36.9 (05 Aug 2019 11:53), Max: 36.9 (05 Aug 2019 11:53)  T(F): 98.4 (05 Aug 2019 11:53), Max: 98.4 (05 Aug 2019 11:53)  HR: 71 (05 Aug 2019 11:53) (71 - 86)  BP: 153/79 (05 Aug 2019 11:53) (112/70 - 153/79)  RR: 18 (05 Aug 2019 11:53) (17 - 19)  SpO2: 96% (05 Aug 2019 11:53) (96% - 97%)    REVIEW OF SYSTEMS:  As per HPI, otherwise unremarkable.     PHYSICAL EXAM:  · CONSTITUTIONAL: Well-developed, well nourished      · EYES: no drainage or redness  · NECK: supple  ·RESPIRATORY: airway patent; respirations non-labored; clear to auscultation bilaterally.  · CARDIOVASCULAR: regular rate and rhythm  . GASTROINTESTINAL:  no distention  · EXTREMITIES: No cyanosis, clubbing. Positive edema. Limited mobility.   · VASCULAR:  Equal and normal pulses (radial)  ·NEUROLOGICAL:  Alert and oriented x 3  · SKIN: No lesions; no rash  . LYMPH NODES: No lymphadedenopathy  · MUSCULOSKELETAL:  No calf tenderness  no joint swelling	    TTE: 8/2/2019  Conclusions:  1. Normal mitral valve. Minimal mitral regurgitation.  2. Calcified, possible bicuspid aortic valve. Peak  transaortic valve gradient equals 7 mm Hg, estimated aortic  valve area equals 2 sqcm (by continuity equation),  consistent with mild aortic stenosis. Mild aortic regurgitation.  3. Proximal septal thickening (proximal septum 1.1cm)  otherwise normal left ventricular internal dimensions and wall thickness.  4. Hyperdynamic left ventricular systolic function.  Flattening of the interventricular septum in both systole  and diastole is  consistent with right ventricular pressure overload.  5. Right ventricular enlargement with normal right  ventricular systolic function.  6. Normal pericardium with no pericardial effusion.  *** No previous Echo exam.    CTA Chest 8/3/2019  IMPRESSION:   Acute pulmonary emboli involving subsegmental vessels of the right upper   and right lower lobe.    Findings were discussed with Dr. Dubon  8/3/2019 1:32 PM by Dr. King   with read back confirmation.    U/S LE 8/3/2019  IMPRESSION:   No evidence of deep venous thrombosis in either lower extremity.    LABS:   CBC Full  -  ( 05 Aug 2019 10:16 )  WBC Count : 11.1 K/uL  RBC Count : 3.52 M/uL  Hemoglobin : 10.5 g/dL  Hematocrit : 29.9 %  Platelet Count - Automated : 443 K/uL  Mean Cell Volume : 85.0 fl  Mean Cell Hemoglobin : 29.7 pg  Mean Cell Hemoglobin Concentration : 35.0 gm/dL    08-04    138  |  106  |  28<H>  ----------------------------<  102<H>  4.0   |  21<L>  |  1.37<H>    Ca    9.1      04 Aug 2019 05:40    IMPRESSION AND PLAN:  78y Female patient PMH HTN, LLE DVT, osteoporosis c/b spinal compression fracture,L tibial fracture, R wrist fracture, and L femur fracture s/p THR presenting with right hip fracture s/p mechanical fall complicated by acute PE and RV strain.     1) Pre-operative Optimization   RCRI 0  METS>4  Intermediate risk operation   No absolute contraindications including active chest pain, decompensated heart failure, and/or life threatening arrythmia. (noted NSVT in the setting of induction)     ·	No further cardiac testing prior to orthopedic surgical intervention.   ·	Continue medical management including pain control.   ·	Hold valsartan morning of operation to allow for more blood pressure room.   ·	When possible, avoid interventions/medications that will significantly reduce pre-load such as aggressive diuresis, vasodilators and so forth in the perioperative period because her RV is temporarily impaired in the setting of PE which diminishes RV output, dependants on increased preload to maintain cardiac output.     2) Acute PE  Setting of right hip fracture  RV strain   Hemodynamically stable.   s/p IVC filter placement 8/4/2019    ·	Continue anticoagulation with heparin when safe from a surgical standpoint.   ·	Upon discharge, she can be started on apixaban 10 mg BID for one week followed by 5 mg BID for at least three months.   ·	Follow up ECHO in about 6-8 weeks while on anticoagulation t o monitor RV recovery.     Yas Sorenson MD, MPH, BENTLEY  Cardiovascular Specialist Attending  [facility]  C: [phone]  E: [email or web address]  (Cardiology nocturnist available every night 7 pm - 7 am; available week days for follow-up; general cardiology consult coverage weekend days)

## 2019-08-05 NOTE — PROGRESS NOTE ADULT - SUBJECTIVE AND OBJECTIVE BOX
Patient is a 78y old  Female who presents with a chief complaint of Right femoral neck fracture.  The patient  was in good health on vacation in [place].  She was walking on [address] and tripped and lost her balance and landed on her right hip.  She was told of the fracture and within three days, she has been able to return home to [place].  The x-rays have confirmed a right femoral neck fracture that requires ORIF.  The patient is in pain secondary to the fracture, but otherwise she had no significant complaints. The patient  went to the operating room  for surgical repair of her hip-fracture August 2, 2019 and the procedure  was  aborted.  Prior to induction on cardiac monitoring, she was found to have a 6 beat run of wide-complex tachycardia.  She also had frequent VPCs.  The patient was seen by cardiology that evening, and cardiac enzymes were obtained and negative.  The patient was sent for a Doppler study of the leg which showed no evidence for DVT bilaterally.  She then had a CT angiogram of the lungs which shows positive pulmonary emboli in the right upper and right lower lobes.  It is likely that the thrombus traveled to the lung, with movement prior to surgery and caused hypoxemia with sudden onset of cardiac arrhythmia.  Cardiology findings no other active problems or ischemia.  No additional testing or cardiac treatments to be rendered at this time and the patient has received cardiology clearance for surgery. The patient continues on telemetry.  She has had no further beats of ventricular tachycardia.  The patient still requires surgical intervention for her fractured hip and is now receiving full dose IV heparin, with no fall in hematocrit. The patient is  scheduled to have a temporary inferior vena cava filter placed 08/04/19 before the surgical procedure and is receiving a steroid prep, because of an iodine dye allergy, prior to the procedure. Right hip ORIF has been delayed until filter is placed and surgery rescheduled electively for 8/6/19.       MEDICATIONS  (STANDING):  acetaminophen   Tablet .. 975 milliGRAM(s) Oral every 8 hours  famotidine    Tablet 20 milliGRAM(s) Oral two times a day  heparin  Infusion.  Unit(s)/Hr (10 mL/Hr) IV Continuous <Continuous>  lactated ringers. 1000 milliLiter(s) (75 mL/Hr) IV Continuous <Continuous>  sodium chloride 0.9%. 1000 milliLiter(s) (100 mL/Hr) IV Continuous <Continuous>  valsartan 80 milliGRAM(s) Oral daily    MEDICATIONS  (PRN):  heparin  Injectable 4000 Unit(s) IV Push every 6 hours PRN For aPTT less than 40  heparin  Injectable 2000 Unit(s) IV Push every 6 hours PRN For aPTT between 40 - 57  ondansetron Injectable 4 milliGRAM(s) IV Push every 6 hours PRN Nausea and/or Vomiting  oxyCODONE    IR 5 milliGRAM(s) Oral every 6 hours PRN Severe Pain (7 - 10)  oxyCODONE    IR 2.5 milliGRAM(s) Oral every 4 hours PRN Moderate Pain (4 - 6)  traMADol 25 milliGRAM(s) Oral every 4 hours PRN Mild Pain (1 - 3)          VITALS:   T(C): 36.7 (08-05-19 @ 20:50), Max: 36.9 (08-05-19 @ 11:53)  HR: 72 (08-05-19 @ 20:50) (71 - 81)  BP: 137/72 (08-05-19 @ 20:50) (137/72 - 153/79)  RR: 18 (08-05-19 @ 20:50) (17 - 18)  SpO2: 98% (08-05-19 @ 20:50) (96% - 98%)  Wt(kg): --    PHYSICAL EXAM:  GENERAL: NAD, well nourished and conversant  HEAD:  Atraumatic  EYES: EOM, PERRLA, conjunctiva pink and sclera white  ENT: No tonsillar erythema, exudates, or enlargement, moist mucous membranes, good dentition, no lesions  NECK: Supple, No JVD, normal thyroid, carotids with normal upstrokes and no bruits  CHEST/LUNG: Clear to auscultation bilaterally, No rales, rhonchi, wheezing, or rubs  HEART: Regular rate and rhythm, No murmurs, rubs, or gallops  ABDOMEN: Soft, nondistended, no masses, guarding, tenderness or rebound, bowel sounds present  EXTREMITIES:  2+ Peripheral Pulses, No clubbing, cyanosis, or edema. s/p re[aojf pf ;ef  LYMPH: No lymphadenopathy noted  SKIN: No rashes or lesions  NERVOUS SYSTEM:  Alert & Oriented X3, normal cognitive function. Motor Strength 5/5 right upper and right lower.  5/5 left upper and left lower extremities, DTRs 2+ intact and symmetric      LABS:        CBC Full  -  ( 05 Aug 2019 10:16 )  WBC Count : 11.1 K/uL  RBC Count : 3.52 M/uL  Hemoglobin : 10.5 g/dL  Hematocrit : 29.9 %  Platelet Count - Automated : 443 K/uL  Mean Cell Volume : 85.0 fl  Mean Cell Hemoglobin : 29.7 pg  Mean Cell Hemoglobin Concentration : 35.0 gm/dL  Auto Neutrophil # : x  Auto Lymphocyte # : x  Auto Monocyte # : x  Auto Eosinophil # : x  Auto Basophil # : x  Auto Neutrophil % : x  Auto Lymphocyte % : x  Auto Monocyte % : x  Auto Eosinophil % : x  Auto Basophil % : x    08-04    138  |  106  |  28<H>  ----------------------------<  102<H>  4.0   |  21<L>  |  1.37<H>    Ca    9.1      04 Aug 2019 05:40        PT/INR - ( 04 Aug 2019 05:40 )   PT: 11.3 sec;   INR: 0.99 ratio         PTT - ( 05 Aug 2019 17:27 )  PTT:74.3 sec    CAPILLARY BLOOD GLUCOSE          RADIOLOGY & ADDITIONAL TESTS:

## 2019-08-06 LAB
ANION GAP SERPL CALC-SCNC: 10 MMOL/L — SIGNIFICANT CHANGE UP (ref 5–17)
ANION GAP SERPL CALC-SCNC: 13 MMOL/L — SIGNIFICANT CHANGE UP (ref 5–17)
ANION GAP SERPL CALC-SCNC: 13 MMOL/L — SIGNIFICANT CHANGE UP (ref 5–17)
APTT BLD: 42.1 SEC — HIGH (ref 27.5–36.3)
APTT BLD: 44.8 SEC — HIGH (ref 27.5–36.3)
BLD GP AB SCN SERPL QL: NEGATIVE — SIGNIFICANT CHANGE UP
BUN SERPL-MCNC: 17 MG/DL — SIGNIFICANT CHANGE UP (ref 7–23)
BUN SERPL-MCNC: 21 MG/DL — SIGNIFICANT CHANGE UP (ref 7–23)
BUN SERPL-MCNC: 22 MG/DL — SIGNIFICANT CHANGE UP (ref 7–23)
CALCIUM SERPL-MCNC: 8 MG/DL — LOW (ref 8.4–10.5)
CALCIUM SERPL-MCNC: 8.5 MG/DL — SIGNIFICANT CHANGE UP (ref 8.4–10.5)
CALCIUM SERPL-MCNC: 8.9 MG/DL — SIGNIFICANT CHANGE UP (ref 8.4–10.5)
CHLORIDE SERPL-SCNC: 105 MMOL/L — SIGNIFICANT CHANGE UP (ref 96–108)
CHLORIDE SERPL-SCNC: 106 MMOL/L — SIGNIFICANT CHANGE UP (ref 96–108)
CHLORIDE SERPL-SCNC: 110 MMOL/L — HIGH (ref 96–108)
CO2 SERPL-SCNC: 21 MMOL/L — LOW (ref 22–31)
CO2 SERPL-SCNC: 21 MMOL/L — LOW (ref 22–31)
CO2 SERPL-SCNC: 22 MMOL/L — SIGNIFICANT CHANGE UP (ref 22–31)
CREAT SERPL-MCNC: 0.77 MG/DL — SIGNIFICANT CHANGE UP (ref 0.5–1.3)
CREAT SERPL-MCNC: 0.92 MG/DL — SIGNIFICANT CHANGE UP (ref 0.5–1.3)
CREAT SERPL-MCNC: 0.97 MG/DL — SIGNIFICANT CHANGE UP (ref 0.5–1.3)
GAS PNL BLDA: SIGNIFICANT CHANGE UP
GLUCOSE SERPL-MCNC: 147 MG/DL — HIGH (ref 70–99)
GLUCOSE SERPL-MCNC: 88 MG/DL — SIGNIFICANT CHANGE UP (ref 70–99)
GLUCOSE SERPL-MCNC: 92 MG/DL — SIGNIFICANT CHANGE UP (ref 70–99)
HCT VFR BLD CALC: 28.3 % — LOW (ref 34.5–45)
HCT VFR BLD CALC: 30.3 % — LOW (ref 34.5–45)
HGB BLD-MCNC: 10.4 G/DL — LOW (ref 11.5–15.5)
HGB BLD-MCNC: 9.2 G/DL — LOW (ref 11.5–15.5)
INR BLD: 1 RATIO — SIGNIFICANT CHANGE UP (ref 0.88–1.16)
MCHC RBC-ENTMCNC: 27.9 PG — SIGNIFICANT CHANGE UP (ref 27–34)
MCHC RBC-ENTMCNC: 29.5 PG — SIGNIFICANT CHANGE UP (ref 27–34)
MCHC RBC-ENTMCNC: 32.7 GM/DL — SIGNIFICANT CHANGE UP (ref 32–36)
MCHC RBC-ENTMCNC: 34.5 GM/DL — SIGNIFICANT CHANGE UP (ref 32–36)
MCV RBC AUTO: 85.4 FL — SIGNIFICANT CHANGE UP (ref 80–100)
MCV RBC AUTO: 85.4 FL — SIGNIFICANT CHANGE UP (ref 80–100)
PLATELET # BLD AUTO: 394 K/UL — SIGNIFICANT CHANGE UP (ref 150–400)
PLATELET # BLD AUTO: 459 K/UL — HIGH (ref 150–400)
POTASSIUM SERPL-MCNC: 3.4 MMOL/L — LOW (ref 3.5–5.3)
POTASSIUM SERPL-MCNC: 3.5 MMOL/L — SIGNIFICANT CHANGE UP (ref 3.5–5.3)
POTASSIUM SERPL-MCNC: 3.8 MMOL/L — SIGNIFICANT CHANGE UP (ref 3.5–5.3)
POTASSIUM SERPL-SCNC: 3.4 MMOL/L — LOW (ref 3.5–5.3)
POTASSIUM SERPL-SCNC: 3.5 MMOL/L — SIGNIFICANT CHANGE UP (ref 3.5–5.3)
POTASSIUM SERPL-SCNC: 3.8 MMOL/L — SIGNIFICANT CHANGE UP (ref 3.5–5.3)
PROTHROM AB SERPL-ACNC: 11.4 SEC — SIGNIFICANT CHANGE UP (ref 10–12.9)
RBC # BLD: 3.31 M/UL — LOW (ref 3.8–5.2)
RBC # BLD: 3.54 M/UL — LOW (ref 3.8–5.2)
RBC # FLD: 14.1 % — SIGNIFICANT CHANGE UP (ref 10.3–14.5)
RBC # FLD: 14.1 % — SIGNIFICANT CHANGE UP (ref 10.3–14.5)
RH IG SCN BLD-IMP: POSITIVE — SIGNIFICANT CHANGE UP
SODIUM SERPL-SCNC: 140 MMOL/L — SIGNIFICANT CHANGE UP (ref 135–145)
SODIUM SERPL-SCNC: 140 MMOL/L — SIGNIFICANT CHANGE UP (ref 135–145)
SODIUM SERPL-SCNC: 141 MMOL/L — SIGNIFICANT CHANGE UP (ref 135–145)
WBC # BLD: 17.2 K/UL — HIGH (ref 3.8–10.5)
WBC # BLD: 8.9 K/UL — SIGNIFICANT CHANGE UP (ref 3.8–10.5)
WBC # FLD AUTO: 17.2 K/UL — HIGH (ref 3.8–10.5)
WBC # FLD AUTO: 8.9 K/UL — SIGNIFICANT CHANGE UP (ref 3.8–10.5)

## 2019-08-06 PROCEDURE — 73502 X-RAY EXAM HIP UNI 2-3 VIEWS: CPT | Mod: 26,RT

## 2019-08-06 RX ORDER — POTASSIUM CHLORIDE 20 MEQ
40 PACKET (EA) ORAL ONCE
Refills: 0 | Status: COMPLETED | OUTPATIENT
Start: 2019-08-06 | End: 2019-08-06

## 2019-08-06 RX ORDER — SODIUM CHLORIDE 9 MG/ML
1000 INJECTION, SOLUTION INTRAVENOUS
Refills: 0 | Status: DISCONTINUED | OUTPATIENT
Start: 2019-08-06 | End: 2019-08-06

## 2019-08-06 RX ORDER — CELECOXIB 200 MG/1
200 CAPSULE ORAL EVERY 12 HOURS
Refills: 0 | Status: DISCONTINUED | OUTPATIENT
Start: 2019-08-06 | End: 2019-08-09

## 2019-08-06 RX ORDER — OXYCODONE HYDROCHLORIDE 5 MG/1
10 TABLET ORAL EVERY 4 HOURS
Refills: 0 | Status: DISCONTINUED | OUTPATIENT
Start: 2019-08-06 | End: 2019-08-09

## 2019-08-06 RX ORDER — PANTOPRAZOLE SODIUM 20 MG/1
40 TABLET, DELAYED RELEASE ORAL
Refills: 0 | Status: DISCONTINUED | OUTPATIENT
Start: 2019-08-06 | End: 2019-08-09

## 2019-08-06 RX ORDER — FENTANYL CITRATE 50 UG/ML
25 INJECTION INTRAVENOUS
Refills: 0 | Status: DISCONTINUED | OUTPATIENT
Start: 2019-08-06 | End: 2019-08-06

## 2019-08-06 RX ORDER — SODIUM CHLORIDE 9 MG/ML
1000 INJECTION, SOLUTION INTRAVENOUS
Refills: 0 | Status: DISCONTINUED | OUTPATIENT
Start: 2019-08-06 | End: 2019-08-08

## 2019-08-06 RX ORDER — FOLIC ACID 0.8 MG
1 TABLET ORAL DAILY
Refills: 0 | Status: DISCONTINUED | OUTPATIENT
Start: 2019-08-06 | End: 2019-08-09

## 2019-08-06 RX ORDER — ONDANSETRON 8 MG/1
4 TABLET, FILM COATED ORAL EVERY 6 HOURS
Refills: 0 | Status: DISCONTINUED | OUTPATIENT
Start: 2019-08-06 | End: 2019-08-06

## 2019-08-06 RX ORDER — TRAMADOL HYDROCHLORIDE 50 MG/1
50 TABLET ORAL EVERY 6 HOURS
Refills: 0 | Status: DISCONTINUED | OUTPATIENT
Start: 2019-08-06 | End: 2019-08-09

## 2019-08-06 RX ORDER — ONDANSETRON 8 MG/1
4 TABLET, FILM COATED ORAL EVERY 6 HOURS
Refills: 0 | Status: DISCONTINUED | OUTPATIENT
Start: 2019-08-06 | End: 2019-08-09

## 2019-08-06 RX ORDER — SODIUM CHLORIDE 9 MG/ML
500 INJECTION INTRAMUSCULAR; INTRAVENOUS; SUBCUTANEOUS ONCE
Refills: 0 | Status: COMPLETED | OUTPATIENT
Start: 2019-08-06 | End: 2019-08-06

## 2019-08-06 RX ORDER — SENNA PLUS 8.6 MG/1
2 TABLET ORAL AT BEDTIME
Refills: 0 | Status: DISCONTINUED | OUTPATIENT
Start: 2019-08-06 | End: 2019-08-09

## 2019-08-06 RX ORDER — OXYCODONE HYDROCHLORIDE 5 MG/1
5 TABLET ORAL EVERY 4 HOURS
Refills: 0 | Status: DISCONTINUED | OUTPATIENT
Start: 2019-08-06 | End: 2019-08-09

## 2019-08-06 RX ORDER — APIXABAN 2.5 MG/1
10 TABLET, FILM COATED ORAL
Refills: 0 | Status: DISCONTINUED | OUTPATIENT
Start: 2019-08-07 | End: 2019-08-09

## 2019-08-06 RX ORDER — CEFAZOLIN SODIUM 1 G
2000 VIAL (EA) INJECTION EVERY 8 HOURS
Refills: 0 | Status: COMPLETED | OUTPATIENT
Start: 2019-08-06 | End: 2019-08-07

## 2019-08-06 RX ORDER — APIXABAN 2.5 MG/1
10 TABLET, FILM COATED ORAL
Refills: 0 | Status: DISCONTINUED | OUTPATIENT
Start: 2019-08-06 | End: 2019-08-06

## 2019-08-06 RX ORDER — SODIUM CHLORIDE 9 MG/ML
500 INJECTION INTRAMUSCULAR; INTRAVENOUS; SUBCUTANEOUS ONCE
Refills: 0 | Status: DISCONTINUED | OUTPATIENT
Start: 2019-08-06 | End: 2019-08-09

## 2019-08-06 RX ORDER — ACETAMINOPHEN 500 MG
1000 TABLET ORAL ONCE
Refills: 0 | Status: COMPLETED | OUTPATIENT
Start: 2019-08-06 | End: 2019-08-06

## 2019-08-06 RX ORDER — DOCUSATE SODIUM 100 MG
100 CAPSULE ORAL THREE TIMES A DAY
Refills: 0 | Status: DISCONTINUED | OUTPATIENT
Start: 2019-08-06 | End: 2019-08-09

## 2019-08-06 RX ORDER — ACETAMINOPHEN 500 MG
1000 TABLET ORAL ONCE
Refills: 0 | Status: DISCONTINUED | OUTPATIENT
Start: 2019-08-06 | End: 2019-08-09

## 2019-08-06 RX ORDER — ACETAMINOPHEN 500 MG
650 TABLET ORAL EVERY 6 HOURS
Refills: 0 | Status: DISCONTINUED | OUTPATIENT
Start: 2019-08-06 | End: 2019-08-06

## 2019-08-06 RX ORDER — APIXABAN 2.5 MG/1
5 TABLET, FILM COATED ORAL
Refills: 0 | Status: CANCELLED | OUTPATIENT
Start: 2019-08-14 | End: 2019-08-09

## 2019-08-06 RX ORDER — VALSARTAN 80 MG/1
80 TABLET ORAL DAILY
Refills: 0 | Status: DISCONTINUED | OUTPATIENT
Start: 2019-08-06 | End: 2019-08-09

## 2019-08-06 RX ORDER — KETOROLAC TROMETHAMINE 30 MG/ML
15 SYRINGE (ML) INJECTION EVERY 8 HOURS
Refills: 0 | Status: DISCONTINUED | OUTPATIENT
Start: 2019-08-06 | End: 2019-08-08

## 2019-08-06 RX ORDER — ASCORBIC ACID 60 MG
500 TABLET,CHEWABLE ORAL
Refills: 0 | Status: DISCONTINUED | OUTPATIENT
Start: 2019-08-06 | End: 2019-08-09

## 2019-08-06 RX ORDER — METOCLOPRAMIDE HCL 10 MG
10 TABLET ORAL ONCE
Refills: 0 | Status: COMPLETED | OUTPATIENT
Start: 2019-08-06 | End: 2019-08-06

## 2019-08-06 RX ORDER — FERROUS SULFATE 325(65) MG
325 TABLET ORAL
Refills: 0 | Status: DISCONTINUED | OUTPATIENT
Start: 2019-08-06 | End: 2019-08-09

## 2019-08-06 RX ADMIN — Medication 15 MILLIGRAM(S): at 16:09

## 2019-08-06 RX ADMIN — SODIUM CHLORIDE 500 MILLILITER(S): 9 INJECTION INTRAMUSCULAR; INTRAVENOUS; SUBCUTANEOUS at 13:47

## 2019-08-06 RX ADMIN — Medication 975 MILLIGRAM(S): at 06:15

## 2019-08-06 RX ADMIN — Medication 10 MILLIGRAM(S): at 20:24

## 2019-08-06 RX ADMIN — Medication 15 MILLIGRAM(S): at 21:57

## 2019-08-06 RX ADMIN — Medication 40 MILLIEQUIVALENT(S): at 04:09

## 2019-08-06 RX ADMIN — Medication 400 MILLIGRAM(S): at 18:37

## 2019-08-06 RX ADMIN — Medication 975 MILLIGRAM(S): at 06:12

## 2019-08-06 RX ADMIN — ONDANSETRON 4 MILLIGRAM(S): 8 TABLET, FILM COATED ORAL at 16:08

## 2019-08-06 RX ADMIN — VALSARTAN 80 MILLIGRAM(S): 80 TABLET ORAL at 06:12

## 2019-08-06 RX ADMIN — Medication 100 MILLIGRAM(S): at 21:57

## 2019-08-06 RX ADMIN — SODIUM CHLORIDE 80 MILLILITER(S): 9 INJECTION, SOLUTION INTRAVENOUS at 15:07

## 2019-08-06 RX ADMIN — Medication 1 TABLET(S): at 18:37

## 2019-08-06 RX ADMIN — SODIUM CHLORIDE 75 MILLILITER(S): 9 INJECTION, SOLUTION INTRAVENOUS at 00:05

## 2019-08-06 RX ADMIN — Medication 500 MILLIGRAM(S): at 18:37

## 2019-08-06 RX ADMIN — Medication 325 MILLIGRAM(S): at 18:37

## 2019-08-06 RX ADMIN — Medication 100 MILLIGRAM(S): at 18:38

## 2019-08-06 RX ADMIN — FAMOTIDINE 20 MILLIGRAM(S): 10 INJECTION INTRAVENOUS at 06:12

## 2019-08-06 RX ADMIN — Medication 1 MILLIGRAM(S): at 18:37

## 2019-08-06 NOTE — PRE-ANESTHESIA EVALUATION ADULT - NSANTHOSAYNRD_GEN_A_CORE
No. EUN screening performed.  STOP BANG Legend: 0-2 = LOW Risk; 3-4 = INTERMEDIATE Risk; 5-8 = HIGH Risk
No. EUN screening performed.  STOP BANG Legend: 0-2 = LOW Risk; 3-4 = INTERMEDIATE Risk; 5-8 = HIGH Risk

## 2019-08-06 NOTE — PROGRESS NOTE ADULT - SUBJECTIVE AND OBJECTIVE BOX
Patient is a 78y old  Female who presents with a chief complaint of Right femoral neck fracture.  The patient  was in good health on vacation in [place].  She was walking on [address] and tripped and lost her balance and landed on her right hip.  She was told of the fracture and within three days, she has been able to return home to [place].  The x-rays have confirmed a right femoral neck fracture that requires ORIF.  The patient is in pain secondary to the fracture, but otherwise she had no significant complaints. The patient  went to the operating room  for surgical repair of her hip-fracture August 2, 2019 and the procedure  was  aborted.  Prior to induction on cardiac monitoring, she was found to have a 6 beat run of wide-complex tachycardia.  She also had frequent VPCs.  The patient was seen by cardiology that evening, and cardiac enzymes were obtained and negative.  The patient was sent for a Doppler study of the leg which showed no evidence for DVT bilaterally.  She then had a CT angiogram of the lungs which shows positive pulmonary emboli in the right upper and right lower lobes.  It is likely that the thrombus traveled to the lung, with movement prior to surgery and caused hypoxemia with sudden onset of cardiac arrhythmia.  Cardiology findings no other active problems or ischemia.  No additional testing or cardiac treatments to be rendered at this time and the patient has received cardiology clearance for surgery. The patient continues on telemetry.  She has had no further beats of ventricular tachycardia.  The patient still requires surgical intervention for her fractured hip and is now receiving full dose IV heparin, with no fall in hematocrit.  08/04/19 the patient received a steroid prep, because of an iodine dye allergy and then the patient had a temporary inferior vena cava filter placed.  The patient underwent right hemiarthroplasty  ORIF on 08/06/19 after been the temporary IVC filter was place, with no hemodynamic complications. Seen post-op and complains of generalized weakness.      MEDICATIONS  (STANDING):  acetaminophen   Tablet .. 975 milliGRAM(s) Oral every 8 hours  famotidine    Tablet 20 milliGRAM(s) Oral two times a day  heparin  Infusion.  Unit(s)/Hr (10 mL/Hr) IV Continuous <Continuous>  lactated ringers. 1000 milliLiter(s) (75 mL/Hr) IV Continuous <Continuous>  sodium chloride 0.9%. 1000 milliLiter(s) (100 mL/Hr) IV Continuous <Continuous>  valsartan 80 milliGRAM(s) Oral daily    MEDICATIONS  (PRN):  heparin  Injectable 4000 Unit(s) IV Push every 6 hours PRN For aPTT less than 40  heparin  Injectable 2000 Unit(s) IV Push every 6 hours PRN For aPTT between 40 - 57  ondansetron Injectable 4 milliGRAM(s) IV Push every 6 hours PRN Nausea and/or Vomiting  oxyCODONE    IR 5 milliGRAM(s) Oral every 6 hours PRN Severe Pain (7 - 10)  oxyCODONE    IR 2.5 milliGRAM(s) Oral every 4 hours PRN Moderate Pain (4 - 6)  traMADol 25 milliGRAM(s) Oral every 4 hours PRN Mild Pain (1 - 3)          VITALS:   T(C): 36.7 (08-05-19 @ 20:50), Max: 36.9 (08-05-19 @ 11:53)  HR: 72 (08-05-19 @ 20:50) (71 - 81)  BP: 137/72 (08-05-19 @ 20:50) (137/72 - 153/79)  RR: 18 (08-05-19 @ 20:50) (17 - 18)  SpO2: 98% (08-05-19 @ 20:50) (96% - 98%)  Wt(kg): --    PHYSICAL EXAM:  GENERAL: NAD, well nourished and conversant  HEAD:  Atraumatic  EYES: EOM, PERRLA, conjunctiva pink and sclera white  ENT: No tonsillar erythema, exudates, or enlargement, moist mucous membranes, good dentition, no lesions  NECK: Supple, No JVD, normal thyroid, carotids with normal upstrokes and no bruits  CHEST/LUNG: Clear to auscultation bilaterally, No rales, rhonchi, wheezing, or rubs  HEART: Regular rate and rhythm, No murmurs, rubs, or gallops  ABDOMEN: Soft, nondistended, no masses, guarding, tenderness or rebound, bowel sounds present  EXTREMITIES:  2+ Peripheral Pulses, No clubbing, cyanosis, or edema. s/p re[aojf pf ;ef  LYMPH: No lymphadenopathy noted  SKIN: No rashes or lesions  NERVOUS SYSTEM:  Alert & Oriented X3, normal cognitive function. Motor Strength 5/5 right upper and right lower.  5/5 left upper and left lower extremities, DTRs 2+ intact and symmetric      LABS:        CBC Full  -  ( 05 Aug 2019 10:16 )  WBC Count : 11.1 K/uL  RBC Count : 3.52 M/uL  Hemoglobin : 10.5 g/dL  Hematocrit : 29.9 %  Platelet Count - Automated : 443 K/uL  Mean Cell Volume : 85.0 fl  Mean Cell Hemoglobin : 29.7 pg  Mean Cell Hemoglobin Concentration : 35.0 gm/dL  Auto Neutrophil # : x  Auto Lymphocyte # : x  Auto Monocyte # : x  Auto Eosinophil # : x  Auto Basophil # : x  Auto Neutrophil % : x  Auto Lymphocyte % : x  Auto Monocyte % : x  Auto Eosinophil % : x  Auto Basophil % : x    08-04    138  |  106  |  28<H>  ----------------------------<  102<H>  4.0   |  21<L>  |  1.37<H>    Ca    9.1      04 Aug 2019 05:40        PT/INR - ( 04 Aug 2019 05:40 )   PT: 11.3 sec;   INR: 0.99 ratio         PTT - ( 05 Aug 2019 17:27 )  PTT:74.3 sec    CAPILLARY BLOOD GLUCOSE          RADIOLOGY & ADDITIONAL TESTS:

## 2019-08-06 NOTE — PRE-ANESTHESIA EVALUATION ADULT - NSANTHPMHFT_GEN_ALL_CORE
Labs, images, notes, consults reviewed.  In brief:     79 yo Female.  PMHx HTN, LLE DVT, osteoporosis c/b spinal compression fracture,L tibial fracture, R wrist fracture, and L femur fracture s/p THR presenting with right hip fracture s/p mechanical fall complicated by acute PE and RV strain.   Per notes, Prior to induction on cardiac monitoring, she was found to have a 6 beat run of wide-complex tachycardia + frequent VPCs.  Doppler study of the leg showed no evidence for DVT bilaterally, cardiac enzymes negative. CT angiogram of the lungs which shows positive pulmonary emboli in the right upper and right lower lobes. No further beats of ventricular tachycardia.  The patient still requires surgical intervention for her fractured hip and is now receiving full dose IV heparin, with no fall in hematocrit. Now s/p IVC filter. Cleared by cardiology for sx.    TTE: 8/2/2019  Conclusions:  1. Normal mitral valve. Minimal mitral regurgitation.  2. Calcified, possible bicuspid aortic valve. Peak  transaortic valve gradient equals 7 mm Hg, estimated aortic  valve area equals 2 sqcm (by continuity equation),  consistent with mild aortic stenosis. Mild aortic regurgitation.  3. Proximal septal thickening (proximal septum 1.1cm)  otherwise normal left ventricular internal dimensions and wall thickness.  4. Hyperdynamic left ventricular systolic function.  Flattening of the interventricular septum in both systole  and diastole is consistent with right ventricular pressure overload.  5. Right ventricular enlargement with normal right  ventricular systolic function.  6. Normal pericardium with no pericardial effusion.  *** No previous Echo exam.    CTA Chest 8/3/2019  IMPRESSION:   Acute pulmonary emboli involving subsegmental vessels of the right upper   and right lower lobe.
Prior history of DVT after prior fracture, on rivaroxaban for short period several years ago. No recent anticoagulation. Denies CP, SOB. METs > 4

## 2019-08-06 NOTE — PROGRESS NOTE ADULT - ASSESSMENT
A/P: 78F w/ R FN Fx  Plan for OR today  NPO  IVF while NPO  Hold all chemical DVT ppx for OR  Encourage incentive spirometry  PT/OT post op  Analgesia  SCD  NWB RLE  Will discuss with attending and advise if plan changes

## 2019-08-06 NOTE — PROGRESS NOTE ADULT - ASSESSMENT
The patient  went to the operating room  for surgical repair of her hip-fracture August 2, 2019 and the procedure  was  aborted.  Prior to induction on cardiac monitoring, she was found to have a 6 beat run of wide-complex tachycardia.  She also had frequent VPCs.  The patient was seen by cardiology that evening, and cardiac enzymes were obtained and negative.  The patient was sent for a Doppler study of the leg which showed no evidence for DVT bilaterally.  She then had a CT angiogram of the lungs which shows positive pulmonary emboli in the right upper and right lower lobes.  Patient currently rate controlled. Denies any shortness of breath. Leg pain has been controlled with pain meds.  08/04/19 the patient received a steroid prep, because of an iodine dye allergy and then the patient had a temporary inferior vena cava filter placed.  The patient underwent right hemiarthroplasty  ORIF on 08/06/19 after been the temporary IVC filter was place, with no hemodynamic complications. Seen post-op and complains of generalized weakness.

## 2019-08-06 NOTE — PROGRESS NOTE ADULT - SUBJECTIVE AND OBJECTIVE BOX
Patient seen and examined at bedside. Reports no acute complaints at this time. Pain is well controlled. No acute events overnight. Pt is s/p IVCF placement on 8/4.    PHYSICAL EXAM:  Vital Signs Last 24 Hrs  T(C): 36.5 (06 Aug 2019 04:30), Max: 36.9 (05 Aug 2019 11:53)  T(F): 97.7 (06 Aug 2019 04:30), Max: 98.4 (05 Aug 2019 11:53)  HR: 70 (06 Aug 2019 04:30) (70 - 76)  BP: 150/79 (06 Aug 2019 04:30) (137/72 - 153/79)  BP(mean): --  RR: 18 (06 Aug 2019 04:30) (18 - 18)  SpO2: 96% (06 Aug 2019 04:30) (96% - 98%)    Gen: NAD, AAOx3    Right Lower Extremity:  Skin intact, no erythema or ecchymosis  +EHL/FHL/TA/GS  SILT L3-S1  +DP/PT Pulses  Compartments soft  No calf TTP B/L

## 2019-08-06 NOTE — CHART NOTE - NSCHARTNOTEFT_GEN_A_CORE
Resting without complaints.   in RR in NA  Pain appears well controlled   No Chest Pain, SOB, N/V.    T(C): 36.8 (08-06-19 @ 13:15), Max: 36.8 (08-06-19 @ 13:15)  HR: 83 (08-06-19 @ 13:45) (70 - 100)  BP: 124/72 (08-06-19 @ 13:45) (123/74 - 150/79)  RR: 16 (08-06-19 @ 13:45) (16 - 18)  SpO2: 98% (08-06-19 @ 13:45) (96% - 100%)  Wt(kg): --    Exam:  Alert and [place], No Acute Distress  Card: +S1/S2, RRR  Pulm: CTAB  Abdomen soft / benign  Mendieta  [Y ]   EXT   RLE       Dressing (s) C/D  [x ]           Calves soft       (+) DF  PT;  EHL / FHL  5/5        No Sensory Deficits noted        2+ pulses(DP / PT)                            9.2<L>  17.2<H> )-----------( 394      ( 06 Aug 2019 14:03 )             28.3<L>     08-06    140  |  106  |  17  ----------------------------<  147<H>  3.8   |  21<L>  |  0.77      A/P: S/p R Total hip arthroplasty    -PT/OT-WBAT-  -Chk AM Labs  -DVT PPx      Cards note appreciated: Eliquis 10 mg  BID x 7 d , then 5 mg BID x 3 mos 2/2 PE      cont IVCF  -Pain Control PO/IV Pain Rx:  IV tylenol / oxycodone  -Continue Current Tx  -Return to telemetry: Cards / medicine follow up      ***See Above  Arian GONCALVES  Orthopedics  B: [phone]  S: [phone]

## 2019-08-06 NOTE — PROGRESS NOTE ADULT - ATTENDING COMMENTS
. 08/04/19 the patient received a steroid prep, because of an iodine dye allergy and then the patient had a temporary inferior vena cava filter placed.  The patient underwent right hemiarthroplasty  ORIF on 08/06/19 after been the temporary IVC filter was place, with no hemodynamic complications. Seen post-op and complains of generalized weakness. No medical complications post-op to date and to proceed with physical therapy, as tolerated. Continues pulmonary toilet to lessen atelectasis, leg exercises as taught to lessen the risk of DVT and supervised pain medications for post-op pain control.

## 2019-08-07 RX ADMIN — Medication 100 MILLIGRAM(S): at 15:17

## 2019-08-07 RX ADMIN — SODIUM CHLORIDE 80 MILLILITER(S): 9 INJECTION, SOLUTION INTRAVENOUS at 12:18

## 2019-08-07 RX ADMIN — Medication 325 MILLIGRAM(S): at 18:55

## 2019-08-07 RX ADMIN — PANTOPRAZOLE SODIUM 40 MILLIGRAM(S): 20 TABLET, DELAYED RELEASE ORAL at 06:32

## 2019-08-07 RX ADMIN — Medication 15 MILLIGRAM(S): at 21:27

## 2019-08-07 RX ADMIN — Medication 100 MILLIGRAM(S): at 06:32

## 2019-08-07 RX ADMIN — Medication 325 MILLIGRAM(S): at 10:17

## 2019-08-07 RX ADMIN — Medication 15 MILLIGRAM(S): at 23:24

## 2019-08-07 RX ADMIN — VALSARTAN 80 MILLIGRAM(S): 80 TABLET ORAL at 06:32

## 2019-08-07 RX ADMIN — Medication 1 TABLET(S): at 12:17

## 2019-08-07 RX ADMIN — APIXABAN 10 MILLIGRAM(S): 2.5 TABLET, FILM COATED ORAL at 06:32

## 2019-08-07 RX ADMIN — Medication 100 MILLIGRAM(S): at 21:28

## 2019-08-07 RX ADMIN — Medication 325 MILLIGRAM(S): at 12:17

## 2019-08-07 RX ADMIN — Medication 1 MILLIGRAM(S): at 12:17

## 2019-08-07 RX ADMIN — Medication 500 MILLIGRAM(S): at 06:32

## 2019-08-07 RX ADMIN — Medication 15 MILLIGRAM(S): at 15:14

## 2019-08-07 RX ADMIN — Medication 15 MILLIGRAM(S): at 15:55

## 2019-08-07 RX ADMIN — Medication 500 MILLIGRAM(S): at 18:55

## 2019-08-07 RX ADMIN — OXYCODONE HYDROCHLORIDE 5 MILLIGRAM(S): 5 TABLET ORAL at 10:47

## 2019-08-07 RX ADMIN — OXYCODONE HYDROCHLORIDE 5 MILLIGRAM(S): 5 TABLET ORAL at 10:17

## 2019-08-07 RX ADMIN — APIXABAN 10 MILLIGRAM(S): 2.5 TABLET, FILM COATED ORAL at 18:55

## 2019-08-07 RX ADMIN — Medication 100 MILLIGRAM(S): at 00:24

## 2019-08-07 RX ADMIN — Medication 15 MILLIGRAM(S): at 06:32

## 2019-08-07 NOTE — OCCUPATIONAL THERAPY INITIAL EVALUATION ADULT - STANDING BALANCE: DYNAMIC, REHAB EVAL
cyanosis. No joint deformity. No clubbing. Neuro: Awake. Grossly nonfocal    Psych: Oriented x 3. No anxiety or agitation. CBC:   Recent Labs      11/05/18   1148   WBC  9.1   HGB  13.7   HCT  42.1   MCV  92.0   PLT  277     BMP:   Recent Labs      11/05/18   1148   NA  141   K  4.2   CL  109   CO2  23   BUN  25*   CREATININE  0.8     LIVER PROFILE:   Recent Labs      11/05/18   1148   AST  12*   ALT  <5*   BILITOT  <0.2   ALKPHOS  62     UA:  Recent Labs      11/05/18   1353   COLORU  Straw   PHUR  6.5   CLARITYU  Clear   SPECGRAV  1.010   LEUKOCYTESUR  Negative   UROBILINOGEN  0.2   BILIRUBINUR  Negative   BLOODU  Negative   GLUCOSEU  Negative        CARDIAC ENZYMES  Recent Labs      11/05/18   1148   TROPONINI  <0.01       U/A:    Lab Results   Component Value Date    NITRITE negative 05/16/2011    COLORU Straw 11/05/2018    CLARITYU Clear 11/05/2018    SPECGRAV 1.010 11/05/2018    LEUKOCYTESUR Negative 11/05/2018    BLOODU Negative 11/05/2018    GLUCOSEU Negative 11/05/2018    GLUCOSEU NEGATIVE 05/29/2011       ABG    Lab Results   Component Value Date    QZT4UAM 30.5 05/24/2011    BEART 6.7 05/24/2011    Y3MVNTGE 95.4 05/24/2011    PHART 7.493 05/24/2011    THGBART 11.6 05/24/2011    IIT5LJW 40.7 05/24/2011    PO2ART 71.1 05/24/2011    SYX6VNV 31.8 05/24/2011       CULTURES    Blood cx x2: pending    EKG:  I have reviewed the EKG with the following interpretation:   11/5/2018  Sinus tachycardia rate of 107  Nonspecific T wave abnormality  Abnormal ECGNo previous ECGs available     RADIOLOGY    XR CHEST PORTABLE 11/5/2018   Final Result   No evidence of acute cardiopulmonary disease. Pertinent previous results reviewed     TTE 12/30/2016  Summary   Normal left ventricle systolic function with an estimated ejection fraction   of 55%.   No regional wall motion abnormalities are seen.   Normal left ventricle diastolic filing pressure.     ASSESSMENT/PLAN:    Chest Pain  - CXR: no acute process  -
fair balance/in rw

## 2019-08-07 NOTE — OCCUPATIONAL THERAPY INITIAL EVALUATION ADULT - LIVES WITH, PROFILE
spouse/Privat house, 4 then 5 luz, 14 steps to bedroom. No bathroom on first floor, bathtub shower in bathroom

## 2019-08-07 NOTE — PHYSICAL THERAPY INITIAL EVALUATION ADULT - ADDITIONAL COMMENTS
lives in private home in [place], 54 steps to enter and 2 steps down, 19 steps to bedroom, w/ handrail/ no bathroom on main floor, did not use assistive device,, glasses all the time, hearing good, R martinez lives in private home in [place], 5 steps to enter and 2 steps down, 19 steps to bedroom, w/ handrail/ no bathroom on main floor, did not use assistive device,, glasses all the time, hearing good, R martinez

## 2019-08-07 NOTE — OCCUPATIONAL THERAPY INITIAL EVALUATION ADULT - PERTINENT HX OF CURRENT PROBLEM, REHAB EVAL
78y Female admitted to [facility] on 8/2/19 [facility] and inability to ambulate s/p  in [place] while on vacation 3 days ago.  Patient denies headstrike or LOC. Localizes pain to R hip/femur. Patient denies radiation of pain. Patient denies numbness/tingling/burning in the RLE. No other bone/joint complaints. Patient is a community ambulator at baseline with/without assistive devices. Patient has no issues w/ ADLs/IADLs.

## 2019-08-07 NOTE — PROGRESS NOTE ADULT - ASSESSMENT
A/P: 78F s/p R CARLOS POD 1  D/c [place]  Analgesia  DVT ppx  Abduction pillow  Post Precautions  WBAT  PT/OT  Encourage incentive spirometry  DC planning  Will discuss with attending and advise if plan changes

## 2019-08-07 NOTE — PHYSICAL THERAPY INITIAL EVALUATION ADULT - PERTINENT HX OF CURRENT PROBLEM, REHAB EVAL
78y Female admitted to [facility] on 8/2/19 [facility] pain and inability to ambulate s/p  in [place] while on vacation 3 days ago.  Patient denies headstrike or LOC. Localizes pain to R hip/femur. Patient denies radiation of pain. Patient denies numbness/tingling/burning in the RLE. No other bone/joint complaints. Patient is a community ambulator at baseline with/without assistive devices. Patient has no issues w/ ADLs/IADLs.

## 2019-08-07 NOTE — PHYSICAL THERAPY INITIAL EVALUATION ADULT - PLANNED THERAPY INTERVENTIONS, PT EVAL
bed mobility training/balance training/transfer training/strengthening/gait training/Goal: To negotiate one flight stairs w/ one handrail step on step independently.

## 2019-08-07 NOTE — PROGRESS NOTE ADULT - SUBJECTIVE AND OBJECTIVE BOX
Patient is a 78y old  Female who presents with a chief complaint of Right femoral neck fracture.  The patient  was in good health on vacation in [place].  She was walking on [address] and tripped and lost her balance and landed on her right hip.  She was told of the fracture and within three days, she has been able to return home to [place].  The x-rays have confirmed a right femoral neck fracture that requires ORIF.  The patient is in pain secondary to the fracture, but otherwise she had no significant complaints. The patient  went to the operating room  for surgical repair of her hip-fracture August 2, 2019 and the procedure  was  aborted.  Prior to induction on cardiac monitoring, she was found to have a 6 beat run of wide-complex tachycardia.  She also had frequent VPCs.  The patient was seen by cardiology that evening, and cardiac enzymes were obtained and negative.  The patient was sent for a Doppler study of the leg which showed no evidence for DVT bilaterally.  She then had a CT angiogram of the lungs which shows positive pulmonary emboli in the right upper and right lower lobes.  It is likely that the thrombus traveled to the lung, with movement prior to surgery and caused hypoxemia with sudden onset of cardiac arrhythmia.  Cardiology findings no other active problems or ischemia.  No additional testing or cardiac treatments to be rendered at this time and the patient has received cardiology clearance for surgery. The patient continues on telemetry.  She has had no further beats of ventricular tachycardia. Patient had a temporary inferior vena cava filter placed.  The patient underwent right hemiarthroplasty  ORIF on 08/06/19 after been the temporary IVC filter was place, with no hemodynamic complications. Seen post-op OOB to chair resting comfortable.       MEDICATIONS  (STANDING):  acetaminophen  IVPB .. 1000 milliGRAM(s) IV Intermittent once  acetaminophen  IVPB .. 1000 milliGRAM(s) IV Intermittent once  apixaban 10 milliGRAM(s) Oral two times a day  ascorbic acid 500 milliGRAM(s) Oral two times a day  celecoxib 200 milliGRAM(s) Oral every 12 hours  docusate sodium 100 milliGRAM(s) Oral three times a day  ferrous    sulfate 325 milliGRAM(s) Oral three times a day with meals  folic acid 1 milliGRAM(s) Oral daily  ketorolac   Injectable 15 milliGRAM(s) IV Push every 8 hours  lactated ringers. 1000 milliLiter(s) (80 mL/Hr) IV Continuous <Continuous>  multivitamin 1 Tablet(s) Oral daily  pantoprazole    Tablet 40 milliGRAM(s) Oral before breakfast  sodium chloride 0.9% Bolus 500 milliLiter(s) IV Bolus once  sodium chloride 0.9% Bolus 500 milliLiter(s) IV Bolus once  valsartan 80 milliGRAM(s) Oral daily    MEDICATIONS  (PRN):  aluminum hydroxide/magnesium hydroxide/simethicone Suspension 30 milliLiter(s) Oral four times a day PRN Indigestion  ondansetron Injectable 4 milliGRAM(s) IV Push every 6 hours PRN Nausea and/or Vomiting  oxyCODONE    IR 5 milliGRAM(s) Oral every 4 hours PRN Moderate Pain (4 - 6)  oxyCODONE    IR 10 milliGRAM(s) Oral every 4 hours PRN Severe Pain (7 - 10)  senna 2 Tablet(s) Oral at bedtime PRN Constipation  traMADol 50 milliGRAM(s) Oral every 6 hours PRN Mild Pain (1 - 3)          VITALS:   T(C): 36.3 (08-07-19 @ 21:08), Max: 36.8 (08-07-19 @ 05:01)  HR: 86 (08-07-19 @ 21:08) (80 - 92)  BP: 104/69 (08-07-19 @ 21:08) (93/58 - 116/72)  RR: 18 (08-07-19 @ 21:08) (18 - 18)  SpO2: 95% (08-07-19 @ 21:08) (95% - 96%)  Wt(kg): --    PHYSICAL EXAM:  GENERAL: NAD, well nourished and conversant  HEAD:  Atraumatic  EYES: EOM, PERRLA, conjunctiva pink and sclera white  ENT: No tonsillar erythema, exudates, or enlargement, moist mucous membranes, good dentition, no lesions  NECK: Supple, No JVD, normal thyroid, carotids with normal upstrokes and no bruits  CHEST/LUNG: Clear to auscultation bilaterally, No rales, rhonchi, wheezing, or rubs  HEART: Regular rate and rhythm, No murmurs, rubs, or gallops  ABDOMEN: Soft, nondistended, no masses, guarding, tenderness or rebound, bowel sounds present  EXTREMITIES:  2+ Peripheral Pulses, No clubbing, cyanosis, or edema. s/p re[aojf pf ;ef  LYMPH: No lymphadenopathy noted  SKIN: No rashes or lesions  NERVOUS SYSTEM:  Alert & Oriented X3, normal cognitive function. Motor Strength 5/5 right upper and right lower.  5/5 left upper and left lower extremities, DTRs 2+ intact and symmetric      LABS:  ABG - ( 06 Aug 2019 10:39 )  pH, Arterial: 7.40  pH, Blood: x     /  pCO2: 38    /  pO2: 157   / HCO3: 24    / Base Excess: -.4   /  SaO2: 99                    CBC Full  -  ( 06 Aug 2019 14:03 )  WBC Count : 17.2 K/uL  RBC Count : 3.31 M/uL  Hemoglobin : 9.2 g/dL  Hematocrit : 28.3 %  Platelet Count - Automated : 394 K/uL  Mean Cell Volume : 85.4 fl  Mean Cell Hemoglobin : 27.9 pg  Mean Cell Hemoglobin Concentration : 32.7 gm/dL  Auto Neutrophil # : x  Auto Lymphocyte # : x  Auto Monocyte # : x  Auto Eosinophil # : x  Auto Basophil # : x  Auto Neutrophil % : x  Auto Lymphocyte % : x  Auto Monocyte % : x  Auto Eosinophil % : x  Auto Basophil % : x    08-06    140  |  106  |  17  ----------------------------<  147<H>  3.8   |  21<L>  |  0.77    Ca    8.0<L>      06 Aug 2019 14:03        PT/INR - ( 06 Aug 2019 04:26 )   PT: 11.4 sec;   INR: 1.00 ratio         PTT - ( 06 Aug 2019 08:00 )  PTT:44.8 sec    CAPILLARY BLOOD GLUCOSE          RADIOLOGY & ADDITIONAL TESTS:

## 2019-08-07 NOTE — PHYSICAL THERAPY INITIAL EVALUATION ADULT - GENERAL OBSERVATIONS, REHAB EVAL
Rec'd in bed, R hip incision w/ dressing clean dry and intact, + hip abd pillow, sequentials and IV lock, NAD< agreeable to PT

## 2019-08-07 NOTE — PROGRESS NOTE ADULT - ASSESSMENT
The patient  went to the operating room  for surgical repair of her hip-fracture August 2, 2019 and the procedure  was  aborted.  Prior to induction on cardiac monitoring, she was found to have a 6 beat run of wide-complex tachycardia.  She also had frequent VPCs.  The patient was seen by cardiology that evening, and cardiac enzymes were obtained and negative.  The patient was sent for a Doppler study of the leg which showed no evidence for DVT bilaterally.  She then had a CT angiogram of the lungs which shows positive pulmonary emboli in the right upper and right lower lobes.  Patient currently rate controlled. Denies any shortness of breath. Leg pain has been controlled with pain meds.  08/04/19 the patient received a steroid prep, because of an iodine dye allergy and then the patient had a temporary inferior vena cava filter placed.  The patient underwent right hemiarthroplasty  ORIF on 08/06/19 after been the temporary IVC filter was place. Patient has been participating with physical therapy. has been OOB to chair. Will eventually need IVC filter removed.

## 2019-08-07 NOTE — PROGRESS NOTE ADULT - SUBJECTIVE AND OBJECTIVE BOX
Patient seen and examined at bedside. Reports no acute complaints at this time. Pain is well controlled. No acute events overnight.    PHYSICAL EXAM:  Vital Signs Last 24 Hrs  T(C): 36.8 (07 Aug 2019 05:01), Max: 36.8 (06 Aug 2019 13:15)  T(F): 98.3 (07 Aug 2019 05:01), Max: 98.3 (07 Aug 2019 05:01)  HR: 84 (07 Aug 2019 05:01) (70 - 100)  BP: 114/67 (07 Aug 2019 05:01) (100/67 - 150/79)  BP(mean): 92 (06 Aug 2019 16:30) (92 - 113)  RR: 18 (07 Aug 2019 05:01) (16 - 19)  SpO2: 96% (07 Aug 2019 05:01) (94% - 100%)    Gen: NAD, AAOx3    Right Lower Extremity:  Abduction pillow in place  Dressing clean dry intact  +EHL/FHL/TA/GS  SILT L3-S1  +DP/PT Pulses  Compartments soft  No calf TTP B/L

## 2019-08-07 NOTE — OCCUPATIONAL THERAPY INITIAL EVALUATION ADULT - GENERAL OBSERVATIONS, REHAB EVAL
Rec'd in chair  R hip incision w/ dressing clean dry and intact, + hip abd pillow,  IV lock, NAD< agreeable to OT

## 2019-08-07 NOTE — PHYSICAL THERAPY INITIAL EVALUATION ADULT - ACTIVE RANGE OF MOTION EXAMINATION, REHAB EVAL
hip FL RLE to 90 2/ 2 to hip precautions/bilateral upper extremity Active ROM was WFL (within functional limits)/bilateral  lower extremity Active ROM was WFL (within functional limits)

## 2019-08-08 ENCOUNTER — TRANSCRIPTION ENCOUNTER (OUTPATIENT)
Age: 79
End: 2019-08-08

## 2019-08-08 LAB
ANION GAP SERPL CALC-SCNC: 11 MMOL/L — SIGNIFICANT CHANGE UP (ref 5–17)
BUN SERPL-MCNC: 30 MG/DL — HIGH (ref 7–23)
CALCIUM SERPL-MCNC: 8.3 MG/DL — LOW (ref 8.4–10.5)
CHLORIDE SERPL-SCNC: 103 MMOL/L — SIGNIFICANT CHANGE UP (ref 96–108)
CO2 SERPL-SCNC: 23 MMOL/L — SIGNIFICANT CHANGE UP (ref 22–31)
CREAT SERPL-MCNC: 1.15 MG/DL — SIGNIFICANT CHANGE UP (ref 0.5–1.3)
GLUCOSE SERPL-MCNC: 103 MG/DL — HIGH (ref 70–99)
HCT VFR BLD CALC: 22.1 % — LOW (ref 34.5–45)
HCT VFR BLD CALC: 25.1 % — LOW (ref 34.5–45)
HGB BLD-MCNC: 7.1 G/DL — LOW (ref 11.5–15.5)
HGB BLD-MCNC: 8 G/DL — LOW (ref 11.5–15.5)
MCHC RBC-ENTMCNC: 27.6 PG — SIGNIFICANT CHANGE UP (ref 27–34)
MCHC RBC-ENTMCNC: 32.1 GM/DL — SIGNIFICANT CHANGE UP (ref 32–36)
MCV RBC AUTO: 86 FL — SIGNIFICANT CHANGE UP (ref 80–100)
PLATELET # BLD AUTO: 379 K/UL — SIGNIFICANT CHANGE UP (ref 150–400)
POTASSIUM SERPL-MCNC: 3.9 MMOL/L — SIGNIFICANT CHANGE UP (ref 3.5–5.3)
POTASSIUM SERPL-SCNC: 3.9 MMOL/L — SIGNIFICANT CHANGE UP (ref 3.5–5.3)
RBC # BLD: 2.57 M/UL — LOW (ref 3.8–5.2)
RBC # FLD: 15.6 % — HIGH (ref 10.3–14.5)
SODIUM SERPL-SCNC: 137 MMOL/L — SIGNIFICANT CHANGE UP (ref 135–145)
WBC # BLD: 8.12 K/UL — SIGNIFICANT CHANGE UP (ref 3.8–10.5)
WBC # FLD AUTO: 8.12 K/UL — SIGNIFICANT CHANGE UP (ref 3.8–10.5)

## 2019-08-08 RX ORDER — APIXABAN 2.5 MG/1
2 TABLET, FILM COATED ORAL
Qty: 0 | Refills: 0 | DISCHARGE
Start: 2019-08-08

## 2019-08-08 RX ORDER — PANTOPRAZOLE SODIUM 20 MG/1
1 TABLET, DELAYED RELEASE ORAL
Qty: 0 | Refills: 0 | DISCHARGE
Start: 2019-08-08

## 2019-08-08 RX ORDER — APIXABAN 2.5 MG/1
1 TABLET, FILM COATED ORAL
Qty: 0 | Refills: 0 | DISCHARGE
Start: 2019-08-08

## 2019-08-08 RX ADMIN — Medication 325 MILLIGRAM(S): at 12:26

## 2019-08-08 RX ADMIN — CELECOXIB 200 MILLIGRAM(S): 200 CAPSULE ORAL at 05:39

## 2019-08-08 RX ADMIN — Medication 100 MILLIGRAM(S): at 15:10

## 2019-08-08 RX ADMIN — Medication 100 MILLIGRAM(S): at 05:33

## 2019-08-08 RX ADMIN — Medication 15 MILLIGRAM(S): at 05:33

## 2019-08-08 RX ADMIN — Medication 325 MILLIGRAM(S): at 08:50

## 2019-08-08 RX ADMIN — Medication 500 MILLIGRAM(S): at 05:33

## 2019-08-08 RX ADMIN — CELECOXIB 200 MILLIGRAM(S): 200 CAPSULE ORAL at 05:33

## 2019-08-08 RX ADMIN — Medication 100 MILLIGRAM(S): at 21:45

## 2019-08-08 RX ADMIN — PANTOPRAZOLE SODIUM 40 MILLIGRAM(S): 20 TABLET, DELAYED RELEASE ORAL at 05:33

## 2019-08-08 RX ADMIN — Medication 500 MILLIGRAM(S): at 18:10

## 2019-08-08 RX ADMIN — APIXABAN 10 MILLIGRAM(S): 2.5 TABLET, FILM COATED ORAL at 18:09

## 2019-08-08 RX ADMIN — Medication 1 MILLIGRAM(S): at 12:26

## 2019-08-08 RX ADMIN — Medication 15 MILLIGRAM(S): at 05:39

## 2019-08-08 RX ADMIN — CELECOXIB 200 MILLIGRAM(S): 200 CAPSULE ORAL at 18:39

## 2019-08-08 RX ADMIN — Medication 325 MILLIGRAM(S): at 18:10

## 2019-08-08 RX ADMIN — Medication 1 TABLET(S): at 12:26

## 2019-08-08 RX ADMIN — CELECOXIB 200 MILLIGRAM(S): 200 CAPSULE ORAL at 18:09

## 2019-08-08 RX ADMIN — SODIUM CHLORIDE 80 MILLILITER(S): 9 INJECTION, SOLUTION INTRAVENOUS at 08:51

## 2019-08-08 RX ADMIN — APIXABAN 10 MILLIGRAM(S): 2.5 TABLET, FILM COATED ORAL at 05:33

## 2019-08-08 NOTE — CHART NOTE - NSCHARTNOTEFT_GEN_A_CORE
Plan for filter removal after rehab. Pt on eliquis which cannot be switched to a heparin drip or on hold at this time per Ortho and cardiology. Follow up information given to pt. Call to make appt at [phone].

## 2019-08-08 NOTE — DISCHARGE NOTE PROVIDER - NSDCFUADDAPPT_GEN_ALL_CORE_FT
FOLLOW-UP:  1. Follow-up with Dr. Garay within 1-2 weeks of discharge.  Please call office for appointment  2. Follow up with Dr. Ruiz to schedule removal of IVC filter. Call office to make appointment. Stop eliquis 2 days prior to procedure  3. Please follow up with your primary care physician within 2 weeks regarding your hospitalization. Call his/her office to make an appointment.

## 2019-08-08 NOTE — PROGRESS NOTE ADULT - ASSESSMENT
The patient  went to the operating room  for surgical repair of her hip-fracture August 2, 2019 and the procedure  was  aborted.  Prior to induction on cardiac monitoring, she was found to have a 6 beat run of wide-complex tachycardia.  She also had frequent VPCs.  The patient was seen by cardiology that evening, and cardiac enzymes were obtained and negative.  The patient was sent for a Doppler study of the leg which showed no evidence for DVT bilaterally.  She then had a CT angiogram of the lungs which shows positive pulmonary emboli in the right upper and right lower lobes.  Patient currently rate controlled. Denies any shortness of breath. Leg pain has been controlled with pain meds.  08/04/19 the patient received a steroid prep, because of an iodine dye allergy and then the patient had a temporary inferior vena cava filter placed.  The patient underwent right hemiarthroplasty  ORIF on 08/06/19 after been the temporary IVC filter was place. Patient has been participating with physical therapy. has been OOB to chair. Will eventually need IVC filter removed after rehab. Patient can have filter in for up to a year. Patient transfused today . will continue to follow H&H and transfuse as needed.

## 2019-08-08 NOTE — PROGRESS NOTE ADULT - SUBJECTIVE AND OBJECTIVE BOX
Patient seen and examined at bedside. Reports no acute complaints at this time. Pain is well controlled. Had some PAT's overnight (4 sec) w/ elevated -180s    PHYSICAL EXAM:  Vital Signs Last 24 Hrs  T(C): 36.7 (08 Aug 2019 03:56), Max: 36.7 (07 Aug 2019 12:01)  T(F): 98 (08 Aug 2019 03:56), Max: 98 (07 Aug 2019 12:01)  HR: 82 (08 Aug 2019 03:56) (79 - 92)  BP: 106/67 (08 Aug 2019 03:56) (93/58 - 116/72)  BP(mean): --  RR: 18 (08 Aug 2019 03:56) (18 - 18)  SpO2: 95% (08 Aug 2019 03:56) (95% - 97%)    Gen: NAD, AAOx3    Right Lower Extremity:  Abduction pillow in place  Dressing clean dry intact  +EHL/FHL/TA/GS  SILT L3-S1  +DP/PT Pulses  Compartments soft  No calf TTP B/L

## 2019-08-08 NOTE — PROVIDER CONTACT NOTE (OTHER) - ASSESSMENT
Patient sleeping during event. V/s 111/70, 79, 97.5, 97%, 18. Patient is asymptomatic, no complaints of sob, dyspnea, palpitations or chest pain.

## 2019-08-08 NOTE — DISCHARGE NOTE PROVIDER - PROVIDER TOKENS
PROVIDER:[TOKEN:[[id number]],FOLLOWUP:[2 weeks]] PROVIDER:[TOKEN:[[id number]],FOLLOWUP:[2 weeks]],PROVIDER:[TOKEN:[[id number]]]

## 2019-08-08 NOTE — CHART NOTE - NSCHARTNOTEFT_GEN_A_CORE
case discussed with IR, cardiologist, and Dr. Garay  plan is to keep IVCF and for it to get removed as outpatient in 2-3 weeks by Dr. Ruiz  will need to hold Eliquis 2 days before planned procedure  patient to be going to rehab facility

## 2019-08-08 NOTE — DISCHARGE NOTE PROVIDER - CARE PROVIDERS DIRECT ADDRESSES
,DirectAddress_Unknown ,DirectAddress_Unknown,[email or web address]

## 2019-08-08 NOTE — PROGRESS NOTE ADULT - SUBJECTIVE AND OBJECTIVE BOX
Patient is a 78y old  Female who presents with a chief complaint of Right femoral neck fracture.  The patient  was in good health on vacation in [place].  She was walking on [address] and tripped and lost her balance and landed on her right hip.  She was told of the fracture and within three days, she has been able to return home to [place].  The x-rays have confirmed a right femoral neck fracture that requires ORIF.  The patient is in pain secondary to the fracture, but otherwise she had no significant complaints. The patient  went to the operating room  for surgical repair of her hip-fracture August 2, 2019 and the procedure  was  aborted.  Prior to induction on cardiac monitoring, she was found to have a 6 beat run of wide-complex tachycardia.  She also had frequent VPCs.  The patient was seen by cardiology that evening, and cardiac enzymes were obtained and negative.  The patient was sent for a Doppler study of the leg which showed no evidence for DVT bilaterally.  She then had a CT angiogram of the lungs which shows positive pulmonary emboli in the right upper and right lower lobes.  It is likely that the thrombus traveled to the lung, with movement prior to surgery and caused hypoxemia with sudden onset of cardiac arrhythmia.  Cardiology findings no other active problems or ischemia.  No additional testing or cardiac treatments to be rendered at this time and the patient has received cardiology clearance for surgery. The patient continues on telemetry.  She has had no further beats of ventricular tachycardia. Patient had a temporary inferior vena cava filter placed.  The patient underwent right hemiarthroplasty  ORIF on 08/06/19 after been the temporary IVC filter was place, with no hemodynamic complications. Seen post-op OOB to chair resting comfortable. Patient found to be anemic. Physical therapy was held and Patient was transfused. Denies any chest pain or SOB    MEDICATIONS  (STANDING):  acetaminophen  IVPB .. 1000 milliGRAM(s) IV Intermittent once  acetaminophen  IVPB .. 1000 milliGRAM(s) IV Intermittent once  apixaban 10 milliGRAM(s) Oral two times a day  ascorbic acid 500 milliGRAM(s) Oral two times a day  celecoxib 200 milliGRAM(s) Oral every 12 hours  docusate sodium 100 milliGRAM(s) Oral three times a day  ferrous    sulfate 325 milliGRAM(s) Oral three times a day with meals  folic acid 1 milliGRAM(s) Oral daily  multivitamin 1 Tablet(s) Oral daily  pantoprazole    Tablet 40 milliGRAM(s) Oral before breakfast  sodium chloride 0.9% Bolus 500 milliLiter(s) IV Bolus once  sodium chloride 0.9% Bolus 500 milliLiter(s) IV Bolus once  valsartan 80 milliGRAM(s) Oral daily    MEDICATIONS  (PRN):  aluminum hydroxide/magnesium hydroxide/simethicone Suspension 30 milliLiter(s) Oral four times a day PRN Indigestion  ondansetron Injectable 4 milliGRAM(s) IV Push every 6 hours PRN Nausea and/or Vomiting  oxyCODONE    IR 5 milliGRAM(s) Oral every 4 hours PRN Moderate Pain (4 - 6)  oxyCODONE    IR 10 milliGRAM(s) Oral every 4 hours PRN Severe Pain (7 - 10)  senna 2 Tablet(s) Oral at bedtime PRN Constipation  traMADol 50 milliGRAM(s) Oral every 6 hours PRN Mild Pain (1 - 3)      PHYSICAL EXAM:  GENERAL: NAD, well nourished and conversant  HEAD:  Atraumatic  EYES: EOM, PERRLA, conjunctiva pink and sclera white  ENT: No tonsillar erythema, exudates, or enlargement, moist mucous membranes, good dentition, no lesions  NECK: Supple, No JVD, normal thyroid, carotids with normal upstrokes and no bruits  CHEST/LUNG: Clear to auscultation bilaterally, No rales, rhonchi, wheezing, or rubs  HEART: Regular rate and rhythm, No murmurs, rubs, or gallops  ABDOMEN: Soft, nondistended, no masses, guarding, tenderness or rebound, bowel sounds present  EXTREMITIES:  2+ Peripheral Pulses, No clubbing, cyanosis, or edema. s/p re[aojf pf ;ef  LYMPH: No lymphadenopathy noted  SKIN: No rashes or lesions  NERVOUS SYSTEM:  Alert & Oriented X3, normal cognitive function. Motor Strength 5/5 right upper and right lower.  5/5 left upper and left lower extremities, DTRs 2+ intact and symmetric    VITALS:   T(C): 36.6 (08-08-19 @ 20:47), Max: 36.8 (08-08-19 @ 11:49)  HR: 81 (08-08-19 @ 20:47) (79 - 84)  BP: 120/72 (08-08-19 @ 20:47) (106/67 - 120/72)  RR: 18 (08-08-19 @ 20:47) (18 - 18)  SpO2: 98% (08-08-19 @ 20:47) (95% - 98%)  Wt(kg): --        LABS:        CBC Full  -  ( 08 Aug 2019 10:56 )  WBC Count : x  RBC Count : x  Hemoglobin : 8.0 g/dL  Hematocrit : 25.1 %  Platelet Count - Automated : x  Mean Cell Volume : x  Mean Cell Hemoglobin : x  Mean Cell Hemoglobin Concentration : x  Auto Neutrophil # : x  Auto Lymphocyte # : x  Auto Monocyte # : x  Auto Eosinophil # : x  Auto Basophil # : x  Auto Neutrophil % : x  Auto Lymphocyte % : x  Auto Monocyte % : x  Auto Eosinophil % : x  Auto Basophil % : x    08-08    137  |  103  |  30<H>  ----------------------------<  103<H>  3.9   |  23  |  1.15    Ca    8.3<L>      08 Aug 2019 07:14            CAPILLARY BLOOD GLUCOSE          RADIOLOGY & ADDITIONAL TESTS:

## 2019-08-08 NOTE — DISCHARGE NOTE PROVIDER - HOSPITAL COURSE
Hospital Course:         The patient is a 79y Female status post Total Hip Arthroplasty of the Right hip for a right femoral neck fracture after sustaining a mechanical fall at home.. Pt sustained injury from a fall and was admitted through the ED. Prior to surgery Patient was medically Optimized. Preoperatively, the patient was found to have pulmonary emboli in the right upper and lower lung, and interventional radiology was consulted for IVCF placement. Patient was also noted to be having PAT's and a cardiology consult was placed. On 8/6/19, the patient was taken to the operating room for surgical fixation. Prophylactic antibiotics were started within 30 minutes before the procedure and continued for 24 hours.  There were no complications during the procedure.  The patient was transferred to recovery room in stable condition and subsequently to the orthopedic floor.  Patient was placed on Apixaban for DVT/PE prophylaxis per cardiology recommendations.  All home medications were continued.  Physical therapy daily and daily labs were followed.  The dressing was kept clean, dry, intact. Dressing was changed prior to discharge if applicable. The rest of the hospital stay was unremarkable and patient was discharged in stable condition to follow up as outpatient. Hospital Course:         The patient is a 79y Female status post Total Hip Arthroplasty of the Right hip for a right femoral neck fracture after sustaining a mechanical fall at home.. Pt sustained injury from a fall and was admitted through the ED. Prior to surgery Patient was medically Optimized. Preoperatively, the patient was found to have pulmonary emboli in the right upper and lower lung, and interventional radiology was consulted for IVCF placement. Patient was also noted to be having PAT's and a cardiology consult was placed. On 8/6/19, the patient was taken to the operating room for surgical fixation. Prophylactic antibiotics were started within 30 minutes before the procedure and continued for 24 hours.  There were no complications during the procedure.  The patient was transferred to recovery room in stable condition and subsequently to the orthopedic floor.  Patient was placed on Apixaban for DVT/PE prophylaxis per cardiology recommendations.  All home medications were continued.  Physical therapy daily and daily labs were followed.  The dressing was kept clean, dry, intact. Dressing was changed prior to discharge if applicable. The rest of the hospital stay was unremarkable and patient was discharged in stable condition to follow up as outpatient. She was instructed to follow up with IR to remove IVCF and to stop eliquis 2 days before the procedure as scheduled with IR

## 2019-08-08 NOTE — DISCHARGE NOTE PROVIDER - NSDCCPCAREPLAN_GEN_ALL_CORE_FT
PRINCIPAL DISCHARGE DIAGNOSIS  Diagnosis: Hip fracture  Assessment and Plan of Treatment: Discharge Instructions Total Hip Arthroplasty  1. Diet: Resume previous diet  2. Activity: WBAT. Rolling walker. Posterior Hip Dislocation Precautions. Abduction Pillow while in bed and Chair. Daily Physical Therapy.  No ROM exercises, no active heel lifts.  3. Call with: fever over 101, wound redness, drainage or open area, calf pain/calf swelling.  4. Wound Care: Remove old and Place new dressing to hip wound every 7days.   5. Avoid direct water beating on bandage. Sponge bath until cleared by your surgeon  6. DVT PE Prophylaxis: Apixaban. See Anticoagulation Instructions. See Med Rec.  7.  Follow Up: Dr. Garay 2 weeks in office. Call to schedule.   8. Pain Medication: eRX sent to your pharmacy for  if you go home.

## 2019-08-08 NOTE — DISCHARGE NOTE PROVIDER - CARE PROVIDER_API CALL
Rolan Garay (MD)  Orthopaedic Surgery  [address] [place] [address], [address]  [place] [address]  Phone: [phone]  Fax: [phone]  Follow Up Time: 2 weeks Rolan Garay)  Orthopaedic Surgery  [address] [place] [address], [address]  [place] [address]  Phone: [phone]  Fax: [phone]  Follow Up Time: 2 weeks    Ayo Ruiz)  Diagnostic Radiology; VascularIntervent Radiology  [address] [place] [id number]  Phone: [phone]  Fax: [phone]  Follow Up Time:

## 2019-08-08 NOTE — PROGRESS NOTE ADULT - ASSESSMENT
A/P: 78F s/p R CARLOS POD 2  Analgesia  Cardio recs appreciated  DVT ppx  Abduction pillow  Post Precautions  WBAT  PT/OT  Encourage incentive spirometry  DC planning  Will discuss with attending and advise if plan changes

## 2019-08-09 ENCOUNTER — TRANSCRIPTION ENCOUNTER (OUTPATIENT)
Age: 79
End: 2019-08-09

## 2019-08-09 VITALS
HEART RATE: 74 BPM | RESPIRATION RATE: 18 BRPM | OXYGEN SATURATION: 93 % | DIASTOLIC BLOOD PRESSURE: 57 MMHG | SYSTOLIC BLOOD PRESSURE: 93 MMHG

## 2019-08-09 LAB
ANION GAP SERPL CALC-SCNC: 13 MMOL/L — SIGNIFICANT CHANGE UP (ref 5–17)
BUN SERPL-MCNC: 25 MG/DL — HIGH (ref 7–23)
CALCIUM SERPL-MCNC: 8.3 MG/DL — LOW (ref 8.4–10.5)
CHLORIDE SERPL-SCNC: 106 MMOL/L — SIGNIFICANT CHANGE UP (ref 96–108)
CO2 SERPL-SCNC: 21 MMOL/L — LOW (ref 22–31)
CREAT SERPL-MCNC: 1.06 MG/DL — SIGNIFICANT CHANGE UP (ref 0.5–1.3)
GLUCOSE SERPL-MCNC: 95 MG/DL — SIGNIFICANT CHANGE UP (ref 70–99)
HCT VFR BLD CALC: 24 % — LOW (ref 34.5–45)
HCT VFR BLD CALC: 29.2 % — LOW (ref 34.5–45)
HGB BLD-MCNC: 7.8 G/DL — LOW (ref 11.5–15.5)
HGB BLD-MCNC: 9.8 G/DL — LOW (ref 11.5–15.5)
MCHC RBC-ENTMCNC: 26.9 PG — LOW (ref 27–34)
MCHC RBC-ENTMCNC: 28.6 PG — SIGNIFICANT CHANGE UP (ref 27–34)
MCHC RBC-ENTMCNC: 32.5 GM/DL — SIGNIFICANT CHANGE UP (ref 32–36)
MCHC RBC-ENTMCNC: 33.4 GM/DL — SIGNIFICANT CHANGE UP (ref 32–36)
MCV RBC AUTO: 82.8 FL — SIGNIFICANT CHANGE UP (ref 80–100)
MCV RBC AUTO: 85.6 FL — SIGNIFICANT CHANGE UP (ref 80–100)
PLATELET # BLD AUTO: 411 K/UL — HIGH (ref 150–400)
PLATELET # BLD AUTO: 441 K/UL — HIGH (ref 150–400)
POTASSIUM SERPL-MCNC: 3.8 MMOL/L — SIGNIFICANT CHANGE UP (ref 3.5–5.3)
POTASSIUM SERPL-SCNC: 3.8 MMOL/L — SIGNIFICANT CHANGE UP (ref 3.5–5.3)
RBC # BLD: 2.9 M/UL — LOW (ref 3.8–5.2)
RBC # BLD: 3.41 M/UL — LOW (ref 3.8–5.2)
RBC # FLD: 14 % — SIGNIFICANT CHANGE UP (ref 10.3–14.5)
RBC # FLD: 15.3 % — HIGH (ref 10.3–14.5)
SODIUM SERPL-SCNC: 140 MMOL/L — SIGNIFICANT CHANGE UP (ref 135–145)
WBC # BLD: 13.5 K/UL — HIGH (ref 3.8–10.5)
WBC # BLD: 14.46 K/UL — HIGH (ref 3.8–10.5)
WBC # FLD AUTO: 13.5 K/UL — HIGH (ref 3.8–10.5)
WBC # FLD AUTO: 14.46 K/UL — HIGH (ref 3.8–10.5)

## 2019-08-09 PROCEDURE — 82947 ASSAY GLUCOSE BLOOD QUANT: CPT

## 2019-08-09 PROCEDURE — 82803 BLOOD GASES ANY COMBINATION: CPT

## 2019-08-09 PROCEDURE — C1713: CPT

## 2019-08-09 PROCEDURE — 82435 ASSAY OF BLOOD CHLORIDE: CPT

## 2019-08-09 PROCEDURE — 82565 ASSAY OF CREATININE: CPT

## 2019-08-09 PROCEDURE — 83605 ASSAY OF LACTIC ACID: CPT

## 2019-08-09 PROCEDURE — 93970 EXTREMITY STUDY: CPT

## 2019-08-09 PROCEDURE — 97161 PT EVAL LOW COMPLEX 20 MIN: CPT

## 2019-08-09 PROCEDURE — 99285 EMERGENCY DEPT VISIT HI MDM: CPT | Mod: 25

## 2019-08-09 PROCEDURE — C1769: CPT

## 2019-08-09 PROCEDURE — C1887: CPT

## 2019-08-09 PROCEDURE — 71045 X-RAY EXAM CHEST 1 VIEW: CPT

## 2019-08-09 PROCEDURE — 73552 X-RAY EXAM OF FEMUR 2/>: CPT

## 2019-08-09 PROCEDURE — 84484 ASSAY OF TROPONIN QUANT: CPT

## 2019-08-09 PROCEDURE — 97116 GAIT TRAINING THERAPY: CPT

## 2019-08-09 PROCEDURE — 85014 HEMATOCRIT: CPT

## 2019-08-09 PROCEDURE — 93306 TTE W/DOPPLER COMPLETE: CPT

## 2019-08-09 PROCEDURE — 85018 HEMOGLOBIN: CPT

## 2019-08-09 PROCEDURE — 81001 URINALYSIS AUTO W/SCOPE: CPT

## 2019-08-09 PROCEDURE — 84132 ASSAY OF SERUM POTASSIUM: CPT

## 2019-08-09 PROCEDURE — 82553 CREATINE MB FRACTION: CPT

## 2019-08-09 PROCEDURE — C1894: CPT

## 2019-08-09 PROCEDURE — C1880: CPT

## 2019-08-09 PROCEDURE — 97530 THERAPEUTIC ACTIVITIES: CPT

## 2019-08-09 PROCEDURE — 36430 TRANSFUSION BLD/BLD COMPNT: CPT

## 2019-08-09 PROCEDURE — 82330 ASSAY OF CALCIUM: CPT

## 2019-08-09 PROCEDURE — 71275 CT ANGIOGRAPHY CHEST: CPT

## 2019-08-09 PROCEDURE — 80053 COMPREHEN METABOLIC PANEL: CPT

## 2019-08-09 PROCEDURE — 73502 X-RAY EXAM HIP UNI 2-3 VIEWS: CPT

## 2019-08-09 PROCEDURE — 97165 OT EVAL LOW COMPLEX 30 MIN: CPT

## 2019-08-09 PROCEDURE — 84100 ASSAY OF PHOSPHORUS: CPT

## 2019-08-09 PROCEDURE — 85610 PROTHROMBIN TIME: CPT

## 2019-08-09 PROCEDURE — 80048 BASIC METABOLIC PNL TOTAL CA: CPT

## 2019-08-09 PROCEDURE — 86901 BLOOD TYPING SEROLOGIC RH(D): CPT

## 2019-08-09 PROCEDURE — C1776: CPT

## 2019-08-09 PROCEDURE — 85027 COMPLETE CBC AUTOMATED: CPT

## 2019-08-09 PROCEDURE — 97110 THERAPEUTIC EXERCISES: CPT

## 2019-08-09 PROCEDURE — 86900 BLOOD TYPING SEROLOGIC ABO: CPT

## 2019-08-09 PROCEDURE — 72170 X-RAY EXAM OF PELVIS: CPT

## 2019-08-09 PROCEDURE — 82550 ASSAY OF CK (CPK): CPT

## 2019-08-09 PROCEDURE — 84295 ASSAY OF SERUM SODIUM: CPT

## 2019-08-09 PROCEDURE — 76000 FLUOROSCOPY <1 HR PHYS/QHP: CPT

## 2019-08-09 PROCEDURE — P9016: CPT

## 2019-08-09 PROCEDURE — 83735 ASSAY OF MAGNESIUM: CPT

## 2019-08-09 PROCEDURE — 86850 RBC ANTIBODY SCREEN: CPT

## 2019-08-09 PROCEDURE — 86923 COMPATIBILITY TEST ELECTRIC: CPT

## 2019-08-09 PROCEDURE — 85730 THROMBOPLASTIN TIME PARTIAL: CPT

## 2019-08-09 PROCEDURE — 96374 THER/PROPH/DIAG INJ IV PUSH: CPT

## 2019-08-09 PROCEDURE — 37191 INS ENDOVAS VENA CAVA FILTR: CPT

## 2019-08-09 PROCEDURE — 93005 ELECTROCARDIOGRAM TRACING: CPT

## 2019-08-09 RX ADMIN — Medication 1 MILLIGRAM(S): at 11:39

## 2019-08-09 RX ADMIN — Medication 325 MILLIGRAM(S): at 17:05

## 2019-08-09 RX ADMIN — Medication 325 MILLIGRAM(S): at 11:39

## 2019-08-09 RX ADMIN — CELECOXIB 200 MILLIGRAM(S): 200 CAPSULE ORAL at 05:49

## 2019-08-09 RX ADMIN — Medication 100 MILLIGRAM(S): at 05:49

## 2019-08-09 RX ADMIN — CELECOXIB 200 MILLIGRAM(S): 200 CAPSULE ORAL at 17:05

## 2019-08-09 RX ADMIN — APIXABAN 10 MILLIGRAM(S): 2.5 TABLET, FILM COATED ORAL at 17:05

## 2019-08-09 RX ADMIN — Medication 325 MILLIGRAM(S): at 09:26

## 2019-08-09 RX ADMIN — Medication 1 TABLET(S): at 11:40

## 2019-08-09 RX ADMIN — PANTOPRAZOLE SODIUM 40 MILLIGRAM(S): 20 TABLET, DELAYED RELEASE ORAL at 05:48

## 2019-08-09 RX ADMIN — VALSARTAN 80 MILLIGRAM(S): 80 TABLET ORAL at 05:48

## 2019-08-09 RX ADMIN — Medication 500 MILLIGRAM(S): at 17:05

## 2019-08-09 RX ADMIN — Medication 500 MILLIGRAM(S): at 05:49

## 2019-08-09 RX ADMIN — APIXABAN 10 MILLIGRAM(S): 2.5 TABLET, FILM COATED ORAL at 05:48

## 2019-08-09 RX ADMIN — Medication 100 MILLIGRAM(S): at 17:08

## 2019-08-09 NOTE — PROGRESS NOTE ADULT - PROVIDER SPECIALTY LIST ADULT
Cardiology
Cardiology
Internal Medicine
Intervent Radiology
Orthopedics
Internal Medicine
Internal Medicine

## 2019-08-09 NOTE — PROGRESS NOTE ADULT - SUBJECTIVE AND OBJECTIVE BOX
Patient seen and examined at bedside. Reports no acute complaints at this time. Pain is well controlled. Would like to go to [facility] today. Plan to have IVCF removed as OP as patient is on Eliquis.    PHYSICAL EXAM:  Vital Signs Last 24 Hrs  T(C): 36.7 (09 Aug 2019 04:47), Max: 36.8 (08 Aug 2019 11:49)  T(F): 98 (09 Aug 2019 04:47), Max: 98.2 (08 Aug 2019 11:49)  HR: 86 (09 Aug 2019 04:47) (81 - 86)  BP: 124/69 (09 Aug 2019 04:47) (112/65 - 124/69)  BP(mean): --  RR: 17 (09 Aug 2019 04:47) (17 - 18)  SpO2: 96% (09 Aug 2019 04:47) (96% - 98%)    Gen: NAD, AAOx3    Right Lower Extremity:  Abduction pillow in place  Dressing clean dry intact  +EHL/FHL/TA/GS  SILT L3-S1  +DP/PT Pulses  Compartments soft  No calf TTP B/L

## 2019-08-09 NOTE — DISCHARGE NOTE NURSING/CASE MANAGEMENT/SOCIAL WORK - NSDCDPATPORTLINK_GEN_ALL_CORE
You can access the [facility], offered by [facility], by registering with the following website: [email or web address]

## 2019-08-09 NOTE — DISCHARGE NOTE NURSING/CASE MANAGEMENT/SOCIAL WORK - NSDCPEPTCOWAR_GEN_ALL_CORE
Coumadin/Warfarin - Follow up monitoring/Coumadin/Warfarin - Dietary Advice/Coumadin/Warfarin - Compliance/Coumadin/Warfarin - Potential for adverse drug reactions and interactions

## 2019-08-09 NOTE — PROGRESS NOTE ADULT - ASSESSMENT
The patient  went to the operating room  for surgical repair of her hip-fracture August 2, 2019 and the procedure  was  aborted.  Prior to induction on cardiac monitoring, she was found to have a 6 beat run of wide-complex tachycardia.  She also had frequent VPCs.  The patient was seen by cardiology that evening, and cardiac enzymes were obtained and negative.  The patient was sent for a Doppler study of the leg which showed no evidence for DVT bilaterally.  She then had a CT angiogram of the lungs which shows positive pulmonary emboli in the right upper and right lower lobes.  Patient currently rate controlled. Denies any shortness of breath. Leg pain has been controlled with pain meds.  08/04/19 the patient received a steroid prep, because of an iodine dye allergy and then the patient had a temporary inferior vena cava filter placed.  The patient underwent right hemiarthroplasty  ORIF on 08/06/19 after been the temporary IVC filter was place. Patient has been participating with physical therapy. has been OOB to chair. Will eventually need IVC filter removed after rehab. Patient can have filter in for up to a year. Patient transfused today . will continue to follow H&H and transfuse as needed.    Would give antoher unit PRBC for Hgn 7. 8.

## 2019-08-09 NOTE — PROGRESS NOTE ADULT - ASSESSMENT
A/P: 78F s/p R CARLOS POD 3  Analgesia  Cardio recs appreciated  IVCF removal as OP  FU morning H/H, may need transfusion  DVT ppx  Abduction pillow  Post Precautions  WBAT  PT/OT  Encourage incentive spirometry  DC planning  Will discuss with attending and advise if plan changes A/P: 78F s/p R CARLOS POD 3  Analgesia  Cardio recs appreciated  IVCF removal as OP  FU morning H/H  DVT ppx  Abduction pillow  Post Precautions  WBAT  PT/OT  Encourage incentive spirometry  DC planning  Will discuss with attending and advise if plan changes

## 2019-08-09 NOTE — PROGRESS NOTE ADULT - SUBJECTIVE AND OBJECTIVE BOX
Patient is a 78y old  Female who presents with a chief complaint of Right femoral neck fracture.  The patient  was in good health on vacation in [place].  She was walking on [employer] and tripped and lost her balance and landed on her right hip.  She was told of the fracture and within three days, she has been able to return home to [place].  The x-rays have confirmed a right femoral neck fracture that requires ORIF.  The patient is in pain secondary to the fracture, but otherwise she had no significant complaints. The patient  went to the operating room  for surgical repair of her hip-fracture August 2, 2019 and the procedure  was  aborted.  Prior to induction on cardiac monitoring, she was found to have a 6 beat run of wide-complex tachycardia.  She also had frequent VPCs.  The patient was seen by cardiology that evening, and cardiac enzymes were obtained and negative.  The patient was sent for a Doppler study of the leg which showed no evidence for DVT bilaterally.  She then had a CT angiogram of the lungs which shows positive pulmonary emboli in the right upper and right lower lobes.  It is likely that the thrombus traveled to the lung, with movement prior to surgery and caused hypoxemia with sudden onset of cardiac arrhythmia.  Cardiology findings no other active problems or ischemia.  No additional testing or cardiac treatments to be rendered at this time and the patient has received cardiology clearance for surgery. The patient continues on telemetry.  She has had no further beats of ventricular tachycardia. Patient had a temporary inferior vena cava filter placed.  The patient underwent right hemiarthroplasty  ORIF on 08/06/19 after been the temporary IVC filter was place, with no hemodynamic complications. Seen post-op OOB to chair resting comfortable. Patient found to be anemic. Physical therapy was held and Patient was transfused. Denies any chest pain or SOB    MEDICATIONS  (STANDING):  acetaminophen  IVPB .. 1000 milliGRAM(s) IV Intermittent once  acetaminophen  IVPB .. 1000 milliGRAM(s) IV Intermittent once  apixaban 10 milliGRAM(s) Oral two times a day  ascorbic acid 500 milliGRAM(s) Oral two times a day  celecoxib 200 milliGRAM(s) Oral every 12 hours  docusate sodium 100 milliGRAM(s) Oral three times a day  ferrous    sulfate 325 milliGRAM(s) Oral three times a day with meals  folic acid 1 milliGRAM(s) Oral daily  multivitamin 1 Tablet(s) Oral daily  pantoprazole    Tablet 40 milliGRAM(s) Oral before breakfast  sodium chloride 0.9% Bolus 500 milliLiter(s) IV Bolus once  sodium chloride 0.9% Bolus 500 milliLiter(s) IV Bolus once  valsartan 80 milliGRAM(s) Oral daily    MEDICATIONS  (PRN):  aluminum hydroxide/magnesium hydroxide/simethicone Suspension 30 milliLiter(s) Oral four times a day PRN Indigestion  ondansetron Injectable 4 milliGRAM(s) IV Push every 6 hours PRN Nausea and/or Vomiting  oxyCODONE    IR 5 milliGRAM(s) Oral every 4 hours PRN Moderate Pain (4 - 6)  oxyCODONE    IR 10 milliGRAM(s) Oral every 4 hours PRN Severe Pain (7 - 10)  senna 2 Tablet(s) Oral at bedtime PRN Constipation  traMADol 50 milliGRAM(s) Oral every 6 hours PRN Mild Pain (1 - 3)    Vital Signs Last 24 Hrs  T(C): 36.7 (09 Aug 2019 04:47), Max: 36.7 (09 Aug 2019 04:47)  T(F): 98 (09 Aug 2019 04:47), Max: 98 (09 Aug 2019 04:47)  HR: 86 (09 Aug 2019 04:47) (81 - 86)  BP: 124/69 (09 Aug 2019 04:47) (120/72 - 124/69)  BP(mean): --  RR: 17 (09 Aug 2019 04:47) (17 - 18)  SpO2: 96% (09 Aug 2019 04:47) (96% - 98%)      PHYSICAL EXAM:  GENERAL: NAD, well nourished and conversant  HEAD:  Atraumatic  EYES: EOM, PERRLA, conjunctiva pink and sclera white  ENT: No tonsillar erythema, exudates, or enlargement, moist mucous membranes, good dentition, no lesions  NECK: Supple, No JVD, normal thyroid, carotids with normal upstrokes and no bruits  CHEST/LUNG: Clear to auscultation bilaterally, No rales, rhonchi, wheezing, or rubs  HEART: Regular rate and rhythm, No murmurs, rubs, or gallops  ABDOMEN: Soft, nondistended, no masses, guarding, tenderness or rebound, bowel sounds present  EXTREMITIES:  2+ Peripheral Pulses, No clubbing, cyanosis, or edema. Right hemiarthroplasty  (+) DVT  LYMPH: No lymphadenopathy noted  SKIN: No rashes or lesions  NERVOUS SYSTEM:  Alert & Oriented X3, normal cognitive function. Motor Strength 5/5 right upper and right lower.  5/5 left upper and left lower extremities, DTRs 2+ intact and symmetric    VITALS:   T(C): 36.6 (08-08-19 @ 20:47), Max: 36.8 (08-08-19 @ 11:49)  HR: 81 (08-08-19 @ 20:47) (79 - 84)  BP: 120/72 (08-08-19 @ 20:47) (106/67 - 120/72)  RR: 18 (08-08-19 @ 20:47) (18 - 18)  SpO2: 98% (08-08-19 @ 20:47) (95% - 98%)  Wt(kg): --        LABS:          CBC Full  -  ( 09 Aug 2019 08:45 )  WBC Count : 14.46 K/uL  RBC Count : 2.90 M/uL  Hemoglobin : 7.8 g/dL  Hematocrit : 24.0 %  Platelet Count - Automated : 411 K/uL  Mean Cell Volume : 82.8 fl  Mean Cell Hemoglobin : 26.9 pg  Mean Cell Hemoglobin Concentration : 32.5 gm/dL  Auto Neutrophil # : x  Auto Lymphocyte # : x  Auto Monocyte # : x  Auto Eosinophil # : x  Auto Basophil # : x  Auto Neutrophil % : x  Auto Lymphocyte % : x  Auto Monocyte % : x  Auto Eosinophil % : x  Auto Basophil % : x    08-09    140  |  106  |  25<H>  ----------------------------<  95  3.8   |  21<L>  |  1.06    Ca    8.3<L>      09 Aug 2019 06:38              CBC Full  -  ( 08 Aug 2019 10:56 )  WBC Count : x  RBC Count : x  Hemoglobin : 8.0 g/dL  Hematocrit : 25.1 %  Platelet Count - Automated : x  Mean Cell Volume : x  Mean Cell Hemoglobin : x  Mean Cell Hemoglobin Concentration : x  Auto Neutrophil # : x  Auto Lymphocyte # : x  Auto Monocyte # : x  Auto Eosinophil # : x  Auto Basophil # : x  Auto Neutrophil % : x  Auto Lymphocyte % : x  Auto Monocyte % : x  Auto Eosinophil % : x  Auto Basophil % : x    08-08    137  |  103  |  30<H>  ----------------------------<  103<H>  3.9   |  23  |  1.15    Ca    8.3<L>      08 Aug 2019 07:14            CAPILLARY BLOOD GLUCOSE          RADIOLOGY & ADDITIONAL TESTS:

## 2019-08-09 NOTE — PROGRESS NOTE ADULT - NSHPATTENDINGPLANDISCUSS_GEN_ALL_CORE
the patient and the orthopedic P.A.
the patient and the orthopedic resident
the patient,  the patient's family and the orthopedic resident
the patient,  the patient's family and the orthopedic resident

## 2019-08-27 ENCOUNTER — APPOINTMENT (OUTPATIENT)
Dept: INTERVENTIONAL RADIOLOGY/VASCULAR | Facility: CLINIC | Age: 79
End: 2019-08-27
Payer: COMMERCIAL

## 2019-08-27 VITALS
HEART RATE: 80 BPM | DIASTOLIC BLOOD PRESSURE: 91 MMHG | SYSTOLIC BLOOD PRESSURE: 167 MMHG | TEMPERATURE: 98 F | WEIGHT: 120 LBS | BODY MASS INDEX: 21.26 KG/M2 | HEIGHT: 63 IN | OXYGEN SATURATION: 98 %

## 2019-08-27 DIAGNOSIS — S22.009A UNSPECIFIED FRACTURE OF UNSPECIFIED THORACIC VERTEBRA, INITIAL ENCOUNTER FOR CLOSED FRACTURE: ICD-10-CM

## 2019-08-27 DIAGNOSIS — H26.9 UNSPECIFIED CATARACT: ICD-10-CM

## 2019-08-27 PROCEDURE — 99214 OFFICE O/P EST MOD 30 MIN: CPT

## 2019-08-27 RX ORDER — VALSARTAN 160 MG/1
160 TABLET, COATED ORAL TWICE DAILY
Refills: 0 | Status: DISCONTINUED | COMMUNITY
Start: 2018-12-07 | End: 2019-08-27

## 2019-09-05 ENCOUNTER — RX RENEWAL (OUTPATIENT)
Age: 79
End: 2019-09-05

## 2019-09-05 ENCOUNTER — FORM ENCOUNTER (OUTPATIENT)
Age: 79
End: 2019-09-05

## 2019-09-05 DIAGNOSIS — Z91.041 RADIOGRAPHIC DYE ALLERGY STATUS: ICD-10-CM

## 2019-09-06 ENCOUNTER — RESULT REVIEW (OUTPATIENT)
Age: 79
End: 2019-09-06

## 2019-09-06 ENCOUNTER — APPOINTMENT (OUTPATIENT)
Dept: ULTRASOUND IMAGING | Facility: HOSPITAL | Age: 79
End: 2019-09-06

## 2019-09-06 ENCOUNTER — OUTPATIENT (OUTPATIENT)
Dept: OUTPATIENT SERVICES | Facility: HOSPITAL | Age: 79
LOS: 1 days | End: 2019-09-06
Payer: COMMERCIAL

## 2019-09-06 DIAGNOSIS — S82.92XA UNSPECIFIED FRACTURE OF LEFT LOWER LEG, INITIAL ENCOUNTER FOR CLOSED FRACTURE: Chronic | ICD-10-CM

## 2019-09-06 DIAGNOSIS — I82.402 ACUTE EMBOLISM AND THROMBOSIS OF UNSPECIFIED DEEP VEINS OF LEFT LOWER EXTREMITY: ICD-10-CM

## 2019-09-06 LAB
ANION GAP SERPL CALC-SCNC: 13 MMOL/L — SIGNIFICANT CHANGE UP (ref 5–17)
BUN SERPL-MCNC: 34 MG/DL — HIGH (ref 7–23)
CALCIUM SERPL-MCNC: 11 MG/DL — HIGH (ref 8.4–10.5)
CHLORIDE SERPL-SCNC: 103 MMOL/L — SIGNIFICANT CHANGE UP (ref 96–108)
CO2 SERPL-SCNC: 24 MMOL/L — SIGNIFICANT CHANGE UP (ref 22–31)
CREAT SERPL-MCNC: 1.11 MG/DL — SIGNIFICANT CHANGE UP (ref 0.5–1.3)
GLUCOSE SERPL-MCNC: 138 MG/DL — HIGH (ref 70–99)
HCT VFR BLD CALC: 35 % — SIGNIFICANT CHANGE UP (ref 34.5–45)
HGB BLD-MCNC: 11.2 G/DL — LOW (ref 11.5–15.5)
INR BLD: 0.99 RATIO — SIGNIFICANT CHANGE UP (ref 0.88–1.16)
MCHC RBC-ENTMCNC: 28.5 PG — SIGNIFICANT CHANGE UP (ref 27–34)
MCHC RBC-ENTMCNC: 32.1 GM/DL — SIGNIFICANT CHANGE UP (ref 32–36)
MCV RBC AUTO: 88.6 FL — SIGNIFICANT CHANGE UP (ref 80–100)
PLATELET # BLD AUTO: 575 K/UL — HIGH (ref 150–400)
POTASSIUM SERPL-MCNC: 4.9 MMOL/L — SIGNIFICANT CHANGE UP (ref 3.5–5.3)
POTASSIUM SERPL-SCNC: 4.9 MMOL/L — SIGNIFICANT CHANGE UP (ref 3.5–5.3)
PROTHROM AB SERPL-ACNC: 11.4 SEC — SIGNIFICANT CHANGE UP (ref 10–12.9)
RBC # BLD: 3.95 M/UL — SIGNIFICANT CHANGE UP (ref 3.8–5.2)
RBC # FLD: 15.6 % — HIGH (ref 10.3–14.5)
SODIUM SERPL-SCNC: 140 MMOL/L — SIGNIFICANT CHANGE UP (ref 135–145)
WBC # BLD: 7.5 K/UL — SIGNIFICANT CHANGE UP (ref 3.8–10.5)
WBC # FLD AUTO: 7.5 K/UL — SIGNIFICANT CHANGE UP (ref 3.8–10.5)

## 2019-09-06 PROCEDURE — 85027 COMPLETE CBC AUTOMATED: CPT

## 2019-09-06 PROCEDURE — 88300 SURGICAL PATH GROSS: CPT

## 2019-09-06 PROCEDURE — C1894: CPT

## 2019-09-06 PROCEDURE — 36415 COLL VENOUS BLD VENIPUNCTURE: CPT

## 2019-09-06 PROCEDURE — C1887: CPT

## 2019-09-06 PROCEDURE — 80048 BASIC METABOLIC PNL TOTAL CA: CPT

## 2019-09-06 PROCEDURE — 88300 SURGICAL PATH GROSS: CPT | Mod: 26

## 2019-09-06 PROCEDURE — 37193 REM ENDOVAS VENA CAVA FILTER: CPT

## 2019-09-06 PROCEDURE — 93970 EXTREMITY STUDY: CPT | Mod: 26

## 2019-09-06 PROCEDURE — C1773: CPT

## 2019-09-06 PROCEDURE — 93970 EXTREMITY STUDY: CPT

## 2019-09-06 PROCEDURE — 85610 PROTHROMBIN TIME: CPT

## 2019-09-11 LAB — SURGICAL PATHOLOGY STUDY: SIGNIFICANT CHANGE UP

## 2019-09-13 DIAGNOSIS — Z46.89 ENCOUNTER FOR FITTING AND ADJUSTMENT OF OTHER SPECIFIED DEVICES: ICD-10-CM

## 2019-09-13 DIAGNOSIS — Z86.711 PERSONAL HISTORY OF PULMONARY EMBOLISM: ICD-10-CM

## 2019-10-01 ENCOUNTER — OUTPATIENT (OUTPATIENT)
Dept: OUTPATIENT SERVICES | Facility: HOSPITAL | Age: 79
LOS: 1 days | Discharge: HOME | End: 2019-10-01

## 2019-10-01 DIAGNOSIS — S82.92XA UNSPECIFIED FRACTURE OF LEFT LOWER LEG, INITIAL ENCOUNTER FOR CLOSED FRACTURE: Chronic | ICD-10-CM

## 2019-10-01 DIAGNOSIS — D47.2 MONOCLONAL GAMMOPATHY: ICD-10-CM

## 2019-10-22 ENCOUNTER — OUTPATIENT (OUTPATIENT)
Dept: OUTPATIENT SERVICES | Facility: HOSPITAL | Age: 79
LOS: 1 days | Discharge: HOME | End: 2019-10-22

## 2019-10-22 DIAGNOSIS — S82.92XA UNSPECIFIED FRACTURE OF LEFT LOWER LEG, INITIAL ENCOUNTER FOR CLOSED FRACTURE: Chronic | ICD-10-CM

## 2019-10-22 DIAGNOSIS — D47.2 MONOCLONAL GAMMOPATHY: ICD-10-CM

## 2019-11-19 ENCOUNTER — OTHER (OUTPATIENT)
Age: 79
End: 2019-11-19

## 2019-12-09 ENCOUNTER — OUTPATIENT (OUTPATIENT)
Dept: OUTPATIENT SERVICES | Facility: HOSPITAL | Age: 79
LOS: 1 days | Discharge: HOME | End: 2019-12-09

## 2019-12-09 DIAGNOSIS — D47.2 MONOCLONAL GAMMOPATHY: ICD-10-CM

## 2019-12-09 DIAGNOSIS — S82.92XA UNSPECIFIED FRACTURE OF LEFT LOWER LEG, INITIAL ENCOUNTER FOR CLOSED FRACTURE: Chronic | ICD-10-CM

## 2019-12-20 ENCOUNTER — OTHER (OUTPATIENT)
Age: 79
End: 2019-12-20

## 2019-12-27 ENCOUNTER — APPOINTMENT (OUTPATIENT)
Dept: HEMATOLOGY ONCOLOGY | Facility: CLINIC | Age: 79
End: 2019-12-27
Payer: COMMERCIAL

## 2019-12-27 ENCOUNTER — RESULT REVIEW (OUTPATIENT)
Age: 79
End: 2019-12-27

## 2019-12-27 ENCOUNTER — LABORATORY RESULT (OUTPATIENT)
Age: 79
End: 2019-12-27

## 2019-12-27 VITALS
DIASTOLIC BLOOD PRESSURE: 83 MMHG | BODY MASS INDEX: 20.5 KG/M2 | OXYGEN SATURATION: 100 % | RESPIRATION RATE: 16 BRPM | WEIGHT: 115.74 LBS | SYSTOLIC BLOOD PRESSURE: 154 MMHG | TEMPERATURE: 98 F | HEART RATE: 75 BPM

## 2019-12-27 LAB
BASOPHILS # BLD AUTO: 0 K/UL — SIGNIFICANT CHANGE UP (ref 0–0.2)
BASOPHILS NFR BLD AUTO: 0.5 % — SIGNIFICANT CHANGE UP (ref 0–2)
EOSINOPHIL # BLD AUTO: 0.1 K/UL — SIGNIFICANT CHANGE UP (ref 0–0.5)
EOSINOPHIL NFR BLD AUTO: 1.1 % — SIGNIFICANT CHANGE UP (ref 0–6)
HCT VFR BLD CALC: 32.3 % — LOW (ref 34.5–45)
HGB BLD-MCNC: 10.7 G/DL — LOW (ref 11.5–15.5)
LYMPHOCYTES # BLD AUTO: 2.8 K/UL — SIGNIFICANT CHANGE UP (ref 1–3.3)
LYMPHOCYTES # BLD AUTO: 37.9 % — SIGNIFICANT CHANGE UP (ref 13–44)
MCHC RBC-ENTMCNC: 29.2 PG — SIGNIFICANT CHANGE UP (ref 27–34)
MCHC RBC-ENTMCNC: 33 G/DL — SIGNIFICANT CHANGE UP (ref 32–36)
MCV RBC AUTO: 88.4 FL — SIGNIFICANT CHANGE UP (ref 80–100)
MONOCYTES # BLD AUTO: 0.3 K/UL — SIGNIFICANT CHANGE UP (ref 0–0.9)
MONOCYTES NFR BLD AUTO: 4.2 % — SIGNIFICANT CHANGE UP (ref 2–14)
NEUTROPHILS # BLD AUTO: 4.2 K/UL — SIGNIFICANT CHANGE UP (ref 1.8–7.4)
NEUTROPHILS NFR BLD AUTO: 56.3 % — SIGNIFICANT CHANGE UP (ref 43–77)
PLATELET # BLD AUTO: 491 K/UL — HIGH (ref 150–400)
RBC # BLD: 3.65 M/UL — LOW (ref 3.8–5.2)
RBC # FLD: 14.3 % — SIGNIFICANT CHANGE UP (ref 10.3–14.5)
WBC # BLD: 7.5 K/UL — SIGNIFICANT CHANGE UP (ref 3.8–10.5)
WBC # FLD AUTO: 7.5 K/UL — SIGNIFICANT CHANGE UP (ref 3.8–10.5)

## 2019-12-27 PROCEDURE — 99214 OFFICE O/P EST MOD 30 MIN: CPT

## 2019-12-27 RX ORDER — METHYLPREDNISOLONE 32 MG/1
32 TABLET ORAL
Qty: 2 | Refills: 0 | Status: DISCONTINUED | COMMUNITY
Start: 2019-09-05 | End: 2019-12-27

## 2019-12-27 RX ORDER — DENOSUMAB 60 MG/ML
60 INJECTION SUBCUTANEOUS
Refills: 0 | Status: DISCONTINUED | COMMUNITY
Start: 2018-06-08 | End: 2019-12-27

## 2019-12-27 NOTE — ASSESSMENT
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 She has a prior history of provoked DVT and is heterozygous for Factor V Leiden. She suffered a PE following fracture of her hip and a long airplane flight. This event was provoked. I do not believe that lifelong Eliquis anticoagulation is indicated and I am concerned that the risks of bleeding in this elderly woman would outweigh the benefit. I would complete 6 months of Eliquis and then switch her to ASA prophylaxis as before.Her mild thrombocytosis may be due to her iron deficiency anemia.\par \par Plan:\par Eliquis 5 mg BID x 6 months total\par CMP, LDH, SPEP, Quantitative immunoglobulins, SFLC \par D/C ASA while taking Eliquis, resume after \par Jobst stockings\par Fe supplement\par GI consult\par RTC 3 months. \par \par

## 2019-12-27 NOTE — ADDENDUM
[FreeTextEntry1] : Documented by Carlos Antonio acting as a scribe for Dr. Octaviano Ching on 12/27/2019 \par \par All medical record entries made by the Scribe were at my, Dr. Octaviano Ching's, direction and personally dictated by me on 12/27/2019. I have reviewed the chart and agree that the record accurately reflects my personal performance of the history, physical exam, results, assessment and plan. I have also personally directed, reviewed, and agree with the discharge instructions.

## 2019-12-27 NOTE — PHYSICAL EXAM
[Fully active, able to carry on all pre-disease performance without restriction] : Status 0 - Fully active, able to carry on all pre-disease performance without restriction [Normal] : affect appropriate [de-identified] : RRR. S1S2 normal. Gr 2/6 KARISHMA apex to left axilla. No gallops.

## 2019-12-27 NOTE — CONSULT LETTER
[Dear  ___] : Dear  [unfilled], [Courtesy Letter:] : I had the pleasure of seeing your patient, [unfilled], in my office today. [Sincerely,] : Sincerely, [DrKyle  ___] : Dr. PALOMO [FreeTextEntry2] : Bernice Chaudhary MD [FreeTextEntry3] : Octaviano Ching M.D., FACP\par Professor of Medicine\par [employer]\par Associate Chief, Division of Hematology\par [facility]\par [facility]\par [address] [place] [address]\[place] [address]\par [phone]\par \par \par \par

## 2019-12-27 NOTE — RESULTS/DATA
[FreeTextEntry1] : WBC 7,500, Hgb 10.7, Hct 32.3, MCV 88.4, Plts 491,000, Diff normal, ANC 4,200\par \par 12/07/19\par CMP: BUN 31, Creatinine 1.33, Globulin 3.8, eGFR 38\par \par SPEP: Gamma 1.9; Weak gamma migrating paraprotein identified.\par Immunofixation: Weak IgG Kappa band identified.\par SFLC: Kappa 6.48, Lambda 4.00\par IgG 2146, A 594, M 148 \par JAK2 Mutation: negative.

## 2019-12-27 NOTE — HISTORY OF PRESENT ILLNESS
[Disease:__________________________] : Disease: [unfilled] [de-identified] : Factor V Leiden heterozygote\par LLE DVT post-op\par 8/2019 PE after fracturing hip and a long flight; IVC filter plced and removed. Fe deficiency anemia noted. [de-identified] : Reports fall and fracture of right hip in 08/2019. S/P right hip replacement. During surgery she was found to have pulmonary embolism and was started on Eliquis. IVC filter was placed and later removed. Labs result at the time showed iron deficiency anemia with hgb 6.. She now feels improved.  No other complaints. She notes no fever, night sweats, swollen glands, weight loss, headaches, visual problems, bleeding, bruising, skeletal pain,chest pain, shortness of breath, abdominal pain, leg pain/swelling, melena, BRBPR. RA is controlled. Received Flu vaccine 2019.  [de-identified] : IgGk

## 2020-03-16 LAB
ALBUMIN MFR SERPL ELPH: 47.8 %
ALBUMIN SERPL ELPH-MCNC: 4.2 G/DL
ALBUMIN SERPL-MCNC: 4 G/DL
ALBUMIN/GLOB SERPL: 0.9 RATIO
ALP BLD-CCNC: 104 U/L
ALPHA1 GLOB MFR SERPL ELPH: 3.8 %
ALPHA1 GLOB SERPL ELPH-MCNC: 0.3 G/DL
ALPHA2 GLOB MFR SERPL ELPH: 8.4 %
ALPHA2 GLOB SERPL ELPH-MCNC: 0.7 G/DL
ALT SERPL-CCNC: 19 U/L
ANION GAP SERPL CALC-SCNC: 12 MMOL/L
AST SERPL-CCNC: 27 U/L
B-GLOBULIN MFR SERPL ELPH: 13.8 %
B-GLOBULIN SERPL ELPH-MCNC: 1.2 G/DL
BILIRUB SERPL-MCNC: 0.6 MG/DL
BUN SERPL-MCNC: 30 MG/DL
CALCIUM SERPL-MCNC: 10.2 MG/DL
CHLORIDE SERPL-SCNC: 103 MMOL/L
CO2 SERPL-SCNC: 22 MMOL/L
CREAT SERPL-MCNC: 1.14 MG/DL
DEPRECATED KAPPA LC FREE/LAMBDA SER: 1.45 RATIO
DEPRECATED KAPPA LC FREE/LAMBDA SER: 1.45 RATIO
GAMMA GLOB FLD ELPH-MCNC: 2.2 G/DL
GAMMA GLOB MFR SERPL ELPH: 26.2 %
GLUCOSE SERPL-MCNC: 94 MG/DL
IGA SER QL IEP: 565 MG/DL
IGG SER QL IEP: 2133 MG/DL
IGM SER QL IEP: 157 MG/DL
INTERPRETATION SERPL IEP-IMP: NORMAL
KAPPA LC CSF-MCNC: 4.3 MG/DL
KAPPA LC CSF-MCNC: 4.3 MG/DL
KAPPA LC SERPL-MCNC: 6.22 MG/DL
KAPPA LC SERPL-MCNC: 6.22 MG/DL
LDH SERPL-CCNC: 214 U/L
M PROTEIN MFR SERPL ELPH: NORMAL
MONOCLON BAND OBS SERPL: NORMAL
POTASSIUM SERPL-SCNC: 4.6 MMOL/L
PROT SERPL-MCNC: 8.4 G/DL
SODIUM SERPL-SCNC: 137 MMOL/L

## 2020-03-27 ENCOUNTER — APPOINTMENT (OUTPATIENT)
Dept: HEMATOLOGY ONCOLOGY | Facility: CLINIC | Age: 80
End: 2020-03-27

## 2020-05-27 ENCOUNTER — APPOINTMENT (OUTPATIENT)
Dept: HEMATOLOGY ONCOLOGY | Facility: CLINIC | Age: 80
End: 2020-05-27

## 2020-08-28 ENCOUNTER — RESULT REVIEW (OUTPATIENT)
Age: 80
End: 2020-08-28

## 2020-08-28 ENCOUNTER — OUTPATIENT (OUTPATIENT)
Dept: OUTPATIENT SERVICES | Facility: HOSPITAL | Age: 80
LOS: 1 days | Discharge: HOME | End: 2020-08-28

## 2020-08-28 ENCOUNTER — OUTPATIENT (OUTPATIENT)
Dept: OUTPATIENT SERVICES | Facility: HOSPITAL | Age: 80
LOS: 1 days | End: 2020-08-28
Payer: MEDICARE

## 2020-08-28 ENCOUNTER — APPOINTMENT (OUTPATIENT)
Dept: HEMATOLOGY ONCOLOGY | Facility: CLINIC | Age: 80
End: 2020-08-28
Payer: MEDICARE

## 2020-08-28 VITALS
HEART RATE: 83 BPM | BODY MASS INDEX: 19.14 KG/M2 | RESPIRATION RATE: 17 BRPM | TEMPERATURE: 98.1 F | SYSTOLIC BLOOD PRESSURE: 166 MMHG | HEIGHT: 63 IN | DIASTOLIC BLOOD PRESSURE: 92 MMHG | WEIGHT: 108.02 LBS | OXYGEN SATURATION: 97 %

## 2020-08-28 DIAGNOSIS — S82.92XA UNSPECIFIED FRACTURE OF LEFT LOWER LEG, INITIAL ENCOUNTER FOR CLOSED FRACTURE: Chronic | ICD-10-CM

## 2020-08-28 DIAGNOSIS — D47.2 MONOCLONAL GAMMOPATHY: ICD-10-CM

## 2020-08-28 DIAGNOSIS — D47.3 ESSENTIAL (HEMORRHAGIC) THROMBOCYTHEMIA: ICD-10-CM

## 2020-08-28 LAB
BASOPHILS # BLD AUTO: 0.05 K/UL — SIGNIFICANT CHANGE UP (ref 0–0.2)
BASOPHILS NFR BLD AUTO: 0.7 % — SIGNIFICANT CHANGE UP (ref 0–2)
EOSINOPHIL # BLD AUTO: 0.07 K/UL — SIGNIFICANT CHANGE UP (ref 0–0.5)
EOSINOPHIL NFR BLD AUTO: 1 % — SIGNIFICANT CHANGE UP (ref 0–6)
HCT VFR BLD CALC: 35.2 % — SIGNIFICANT CHANGE UP (ref 34.5–45)
HGB BLD-MCNC: 11.2 G/DL — LOW (ref 11.5–15.5)
IMM GRANULOCYTES NFR BLD AUTO: 0.3 % — SIGNIFICANT CHANGE UP (ref 0–1.5)
LYMPHOCYTES # BLD AUTO: 2 K/UL — SIGNIFICANT CHANGE UP (ref 1–3.3)
LYMPHOCYTES # BLD AUTO: 27.2 % — SIGNIFICANT CHANGE UP (ref 13–44)
MCHC RBC-ENTMCNC: 28.6 PG — SIGNIFICANT CHANGE UP (ref 27–34)
MCHC RBC-ENTMCNC: 31.8 GM/DL — LOW (ref 32–36)
MCV RBC AUTO: 89.8 FL — SIGNIFICANT CHANGE UP (ref 80–100)
MONOCYTES # BLD AUTO: 0.4 K/UL — SIGNIFICANT CHANGE UP (ref 0–0.9)
MONOCYTES NFR BLD AUTO: 5.4 % — SIGNIFICANT CHANGE UP (ref 2–14)
NEUTROPHILS # BLD AUTO: 4.81 K/UL — SIGNIFICANT CHANGE UP (ref 1.8–7.4)
NEUTROPHILS NFR BLD AUTO: 65.4 % — SIGNIFICANT CHANGE UP (ref 43–77)
NRBC # BLD: 0 /100 WBCS — SIGNIFICANT CHANGE UP (ref 0–0)
PLATELET # BLD AUTO: 621 K/UL — HIGH (ref 150–400)
RBC # BLD: 3.92 M/UL — SIGNIFICANT CHANGE UP (ref 3.8–5.2)
RBC # FLD: 15.1 % — HIGH (ref 10.3–14.5)
WBC # BLD: 7.35 K/UL — SIGNIFICANT CHANGE UP (ref 3.8–10.5)
WBC # FLD AUTO: 7.35 K/UL — SIGNIFICANT CHANGE UP (ref 3.8–10.5)

## 2020-08-28 PROCEDURE — 81219 CALR GENE COM VARIANTS: CPT

## 2020-08-28 PROCEDURE — 99214 OFFICE O/P EST MOD 30 MIN: CPT

## 2020-08-28 RX ORDER — APIXABAN 5 MG/1
5 TABLET, FILM COATED ORAL
Qty: 180 | Refills: 3 | Status: DISCONTINUED | COMMUNITY
End: 2020-08-28

## 2020-08-28 NOTE — REVIEW OF SYSTEMS
[Recent Change In Weight] : ~T recent weight change [Lower Ext Edema] : lower extremity edema [Easy Bruising] : a tendency for easy bruising [Negative] : Allergic/Immunologic

## 2020-08-28 NOTE — PHYSICAL EXAM
[Fully active, able to carry on all pre-disease performance without restriction] : Status 0 - Fully active, able to carry on all pre-disease performance without restriction [Normal] : affect appropriate [de-identified] : RRR. S1S2 normal. Gr 2/6 KARISHMA apex to left axilla. No gallops.

## 2020-08-28 NOTE — ASSESSMENT
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 She has a prior history of provoked DVT and is heterozygous for Factor V Leiden. She suffered a PE following fracture of her hip and a long airplane flight. This event was provoked. I do not believe that lifelong Eliquis anticoagulation is indicated and I am concerned that the risks of bleeding in this elderly woman would outweigh the benefit. Completed 6 months of Eliquis and then switched to ASA prophylaxis as before.Her mild thrombocytosis may be due to her iron deficiency anemia.\par \par Plan:\par CMP, LDH, SPEP, Quantitative immunoglobulins, SFLC \par mpl, calreticulin\par ASA\par Jobst stockings\par Fe supplement\par GI consult\par RTC 6 months. \par \par

## 2020-08-28 NOTE — CONSULT LETTER
[Courtesy Letter:] : I had the pleasure of seeing your patient, [unfilled], in my office today. [Dear  ___] : Dear  [unfilled], [Sincerely,] : Sincerely, [FreeTextEntry3] : Octaviano Ching M.D., FACP\par Professor of Medicine\par [employer]\par Associate Chief, Division of Hematology\par [facility]\par [facility]\par [address] [place] [address]\[place] [address]\par [phone]\par \par \par \par   [FreeTextEntry2] : Bernice Chaudhary MD [DrKyle  ___] : Dr. PALOMO

## 2020-08-28 NOTE — HISTORY OF PRESENT ILLNESS
[Disease:__________________________] : Disease: [unfilled] [de-identified] : Factor V Leiden heterozygote\par LLE DVT post-op\par 8/2019 PE after fracturing hip and a long flight; IVC filter placed and removed. Fe deficiency anemia noted. [de-identified] : IgGk [de-identified] :  She now feels well.   recently passed away. Both legs swell in Summer. Support hose helps. No calf pain. Bruises easily. No other complaints. She notes no fever, night sweats, swollen glands, headaches, new visual problems, bleeding,  skeletal pain,chest pain, shortness of breath, abdominal pain, melena, BRBPR. Has lost some weight since  passed. Is depressed due to that. RA is controlled.

## 2020-09-01 LAB — CALRETICULIN INTERPRETATION: SIGNIFICANT CHANGE UP

## 2020-09-02 LAB
ALBUMIN MFR SERPL ELPH: 51.8 %
ALBUMIN SERPL ELPH-MCNC: 4.4 G/DL
ALBUMIN SERPL-MCNC: 4 G/DL
ALBUMIN/GLOB SERPL: 1.1 RATIO
ALP BLD-CCNC: 80 U/L
ALPHA1 GLOB MFR SERPL ELPH: 3.7 %
ALPHA1 GLOB SERPL ELPH-MCNC: 0.3 G/DL
ALPHA2 GLOB MFR SERPL ELPH: 8.3 %
ALPHA2 GLOB SERPL ELPH-MCNC: 0.6 G/DL
ALT SERPL-CCNC: 20 U/L
ANION GAP SERPL CALC-SCNC: 11 MMOL/L
AST SERPL-CCNC: 27 U/L
B-GLOBULIN MFR SERPL ELPH: 13.3 %
B-GLOBULIN SERPL ELPH-MCNC: 1 G/DL
BILIRUB SERPL-MCNC: 0.6 MG/DL
BUN SERPL-MCNC: 29 MG/DL
CALCIUM SERPL-MCNC: 10.9 MG/DL
CHLORIDE SERPL-SCNC: 98 MMOL/L
CO2 SERPL-SCNC: 25 MMOL/L
CREAT SERPL-MCNC: 1.22 MG/DL
DEPRECATED KAPPA LC FREE/LAMBDA SER: 1.66 RATIO
DEPRECATED KAPPA LC FREE/LAMBDA SER: 1.66 RATIO
GAMMA GLOB FLD ELPH-MCNC: 1.8 G/DL
GAMMA GLOB MFR SERPL ELPH: 22.9 %
GLUCOSE SERPL-MCNC: 89 MG/DL
IGA SER QL IEP: 529 MG/DL
IGG SER QL IEP: 1871 MG/DL
IGM SER QL IEP: 159 MG/DL
INTERPRETATION SERPL IEP-IMP: NORMAL
KAPPA LC CSF-MCNC: 4.45 MG/DL
KAPPA LC CSF-MCNC: 4.45 MG/DL
KAPPA LC SERPL-MCNC: 7.38 MG/DL
KAPPA LC SERPL-MCNC: 7.38 MG/DL
LDH SERPL-CCNC: 224 U/L
M PROTEIN MFR SERPL ELPH: NORMAL
MONOCLON BAND OBS SERPL: NORMAL
POTASSIUM SERPL-SCNC: 5.1 MMOL/L
PROT SERPL-MCNC: 7.8 G/DL
SODIUM SERPL-SCNC: 134 MMOL/L

## 2020-09-24 LAB
AMINO ACID MPL: NORMAL
ASSAY DETAILS MPL: NORMAL
BLOCK/SPECIMEN ID: NORMAL
EXON MPL: 10
GENE MPL: NORMAL
INTERPRETATION: NORMAL
Lab: NORMAL
MPL EXON 10 MUTATION: DETECTED
MPL SPECIMEN SOURCE: NORMAL
MUTATION TYPE: NORMAL
MUTFREQ: 8.9
NUCCHA: NORMAL
REFERENCE MPL: NORMAL

## 2020-10-06 ENCOUNTER — OUTPATIENT (OUTPATIENT)
Dept: OUTPATIENT SERVICES | Facility: HOSPITAL | Age: 80
LOS: 1 days | Discharge: HOME | End: 2020-10-06

## 2020-10-06 DIAGNOSIS — S82.92XA UNSPECIFIED FRACTURE OF LEFT LOWER LEG, INITIAL ENCOUNTER FOR CLOSED FRACTURE: Chronic | ICD-10-CM

## 2020-10-06 DIAGNOSIS — D47.2 MONOCLONAL GAMMOPATHY: ICD-10-CM

## 2020-10-09 ENCOUNTER — RESULT REVIEW (OUTPATIENT)
Age: 80
End: 2020-10-09

## 2020-10-09 ENCOUNTER — APPOINTMENT (OUTPATIENT)
Dept: HEMATOLOGY ONCOLOGY | Facility: CLINIC | Age: 80
End: 2020-10-09
Payer: MEDICARE

## 2020-10-09 VITALS
TEMPERATURE: 97.5 F | WEIGHT: 108.47 LBS | DIASTOLIC BLOOD PRESSURE: 85 MMHG | BODY MASS INDEX: 19.21 KG/M2 | RESPIRATION RATE: 16 BRPM | HEART RATE: 72 BPM | SYSTOLIC BLOOD PRESSURE: 150 MMHG | OXYGEN SATURATION: 100 %

## 2020-10-09 LAB
BASOPHILS # BLD AUTO: 0.05 K/UL — SIGNIFICANT CHANGE UP (ref 0–0.2)
BASOPHILS NFR BLD AUTO: 0.6 % — SIGNIFICANT CHANGE UP (ref 0–2)
EOSINOPHIL # BLD AUTO: 0.05 K/UL — SIGNIFICANT CHANGE UP (ref 0–0.5)
EOSINOPHIL NFR BLD AUTO: 0.6 % — SIGNIFICANT CHANGE UP (ref 0–6)
HCT VFR BLD CALC: 37.3 % — SIGNIFICANT CHANGE UP (ref 34.5–45)
HGB BLD-MCNC: 12 G/DL — SIGNIFICANT CHANGE UP (ref 11.5–15.5)
IMM GRANULOCYTES NFR BLD AUTO: 0.4 % — SIGNIFICANT CHANGE UP (ref 0–1.5)
LYMPHOCYTES # BLD AUTO: 2.27 K/UL — SIGNIFICANT CHANGE UP (ref 1–3.3)
LYMPHOCYTES # BLD AUTO: 27.6 % — SIGNIFICANT CHANGE UP (ref 13–44)
MCHC RBC-ENTMCNC: 28.4 PG — SIGNIFICANT CHANGE UP (ref 27–34)
MCHC RBC-ENTMCNC: 32.2 G/DL — SIGNIFICANT CHANGE UP (ref 32–36)
MCV RBC AUTO: 88.2 FL — SIGNIFICANT CHANGE UP (ref 80–100)
MONOCYTES # BLD AUTO: 0.48 K/UL — SIGNIFICANT CHANGE UP (ref 0–0.9)
MONOCYTES NFR BLD AUTO: 5.8 % — SIGNIFICANT CHANGE UP (ref 2–14)
NEUTROPHILS # BLD AUTO: 5.34 K/UL — SIGNIFICANT CHANGE UP (ref 1.8–7.4)
NEUTROPHILS NFR BLD AUTO: 65 % — SIGNIFICANT CHANGE UP (ref 43–77)
NRBC # BLD: 0 /100 WBCS — SIGNIFICANT CHANGE UP (ref 0–0)
PLATELET # BLD AUTO: 671 K/UL — HIGH (ref 150–400)
RBC # BLD: 4.23 M/UL — SIGNIFICANT CHANGE UP (ref 3.8–5.2)
RBC # FLD: 15.2 % — HIGH (ref 10.3–14.5)
WBC # BLD: 8.22 K/UL — SIGNIFICANT CHANGE UP (ref 3.8–10.5)
WBC # FLD AUTO: 8.22 K/UL — SIGNIFICANT CHANGE UP (ref 3.8–10.5)

## 2020-10-09 PROCEDURE — 99213 OFFICE O/P EST LOW 20 MIN: CPT

## 2020-10-09 NOTE — HISTORY OF PRESENT ILLNESS
[Disease:__________________________] : Disease: [unfilled] [de-identified] : Factor V Leiden heterozygote\par LLE DVT post-op\par 8/2019 PE after fracturing hip and a long flight; IVC filter placed and removed. 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 Seeing ENT for vertigo. Is improving.  No other complaints. She notes no fever, night sweats, swollen glands, headaches, new visual problems, bleeding,  bruising, skeletal pain,chest pain, shortness of breath, abdominal pain, melena, BRBPR, leg pain/swelling, foot pain, pruritus. Weight stable. RA is controlled.

## 2020-10-09 NOTE — RESULTS/DATA
[FreeTextEntry1] : WBC 8200 Hgb 12 Hct 37.3 Platelets 671,000 Diff normal\par \par 8/28/20\par MPL Exon 10 +\par CALR negative\par CMP Ca 10.9\par \par SPEP M weak gamma\par Quant IgG 1871, A 529, M 159\par SFLC k 7.38, lambda 4.45

## 2020-10-09 NOTE — CONSULT LETTER
[Dear  ___] : Dear  [unfilled], [Courtesy Letter:] : I had the pleasure of seeing your patient, [unfilled], in my office today. [Sincerely,] : Sincerely, [DrKyle  ___] : Dr. PALOMO [FreeTextEntry2] : Bernice Chaudhary MD [FreeTextEntry3] : Octaviano Ching M.D., FACP\par Professor of Medicine\par [employer]\par Associate Chief, Division of Hematology\par [facility]\par [facility]\par [address] [place] [address]\[place] [address]\par [phone]\par \par \par \par

## 2020-10-09 NOTE — ASSESSMENT
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 She has a prior history of provoked DVT and is heterozygous for Factor V Leiden. She suffered a PE following fracture of her hip and a long airplane flight. This event was provoked. I do not believe that lifelong Eliquis anticoagulation is indicated and I am concerned that the risks of bleeding in this elderly woman would outweigh the benefit. 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 Reviewed diagnosis, therapy, precautions and excellent prognosis.\par \par Plan:\par ASA\par Jobst stockings\par D/C Fe supplement\par Flu vaccine this Fall\par GI consult\par CMP, LDH\par RTC 2 months. \par

## 2020-10-09 NOTE — REASON FOR VISIT
[Follow-Up Visit] : a follow-up visit for [Coagulopathy] : coagulopathy [Monoclonal Gammopathy] : monoclonal gammopathy [Myeloproliferative Disorder] : myeloproliferative disorder [Thrombocytosis] : thrombocytosis

## 2020-10-11 NOTE — ED PROVIDER NOTE - ATTESTATION, MLM
"Subjective:       Patient ID: Alisia Gusman is a 78 y.o. female.    Vitals:  height is 5' 2" (1.575 m) and weight is 81.6 kg (180 lb). Her temperature is 97.7 °F (36.5 °C). Her blood pressure is 123/71 and her pulse is 94. Her respiration is 16 and oxygen saturation is 97%.     Chief Complaint: Rash    Patient noticed rash and itching on her back last night. Patient itched and put alcohol on which caused burning.     Rash  This is a new problem. The current episode started yesterday. The problem has been gradually worsening since onset. The affected locations include the back. The rash is characterized by burning, redness and itchiness. It is unknown if there was an exposure to a precipitant. Pertinent negatives include no cough, fever or sore throat. Past treatments include nothing.       Constitution: Negative for chills and fever.   HENT: Negative for facial swelling and sore throat.    Neck: Negative for painful lymph nodes.   Eyes: Negative for eye itching and eyelid swelling.   Respiratory: Negative for cough.    Musculoskeletal: Negative for joint pain and joint swelling.   Skin: Positive for rash. Negative for color change, pale, wound, abrasion, laceration, lesion, skin thickening/induration, puncture wound, erythema, bruising, abscess, avulsion and hives.   Allergic/Immunologic: Positive for itching. Negative for environmental allergies, immunocompromised state and hives.   Hematologic/Lymphatic: Negative for swollen lymph nodes.       Objective:      Physical Exam   Skin: Skin is rash. erythema                 Assessment:       1. Herpes zoster without complication        Plan:         Herpes zoster without complication  -     triamcinolone acetonide 0.1% (KENALOG) 0.1 % cream; Apply topically 2 (two) times daily.  Dispense: 1 Tube; Refill: 0  -     valACYclovir (VALTREX) 1000 MG tablet; Take 1 tablet (1,000 mg total) by mouth 3 (three) times daily. for 7 days  Dispense: 21 tablet; Refill: 0      Patient " Instructions     Use cool compresses to help with itching.     Apply steroid cream twice daily.     Take valtrex three times a day for 7 days.     Follow up immediately if symptoms persist or worsen.     You must understand that you've received an Urgent Care treatment only and that you may be released before all your medical problems are known or treated. You, the patient, will arrange for follow up care as instructed.  Follow up with your PCP or specialty clinic as directed in the next 1-2 weeks if not improved or as needed.  You can call [phone] to schedule an appointment with the appropriate provider.  If your condition worsens we recommend that you receive another evaluation at the emergency room immediately or contact your primary medical clinics after hours call service to discuss your concerns.  Please return here or go to the Emergency Department for any concerns or worsening of condition.      Shingles (Herpes Zoster)     Talk to your healthcare provider about the shingles vaccine.     Shingles is also called herpes zoster. It is a painful skin rash caused by the herpes zoster virus. This is the same virus that causes chickenpox. After a person has chickenpox, the virus remains inactive in the nerve cells. Years later, the virus can become active again and travel to the skin. Most people have shingles only once, but it is possible to have it more than once.  What are the risk factors for shingles?  Anyone who has ever had chickenpox can develop shingles. But your risk is greater if you:  · Are 50 years of age or older  · Have an illness that weakens your immune system, such as HIV/AIDS  · Have cancer, especially Hodgkin disease or lymphoma  · Take medicines that weaken your immune system  What are the symptoms of shingles?  · The first sign of shingles is usually pain, burning, tingling, or itching on one part of your face or body. You may also feel as if you have the flu, with fever and  chills.  · A red rash with small blisters appears within a few days. The rash may appear as follows:   ¨ The blisters can occur anywhere, but theyre most common on the back, chest, or abdomen.  ¨ They usually appear on only one side of the body, spreading along the nerve pathway where the virus was inactive.   ¨ The rash can also form around an eye, along one side of the face or neck, or in the mouth.  ¨ In a few people, usually those with weakened immune systems, shingles appear on more than one part of the body at once.  · After a few days, the blisters become dry and form a crust. The crust falls off in days to weeks. The blisters generally do not leave scars.  How is shingles treated?  For most people, shingles heals on its own in a few weeks. But treatment is recommended to help relieve pain, speed healing, and reduce the risk of complications. Antiviral medicines are prescribed within the first 72 hours of the appearance of the rash. To lessen symptoms:  · Apply ice packs (wrapped in a thin towel) or cool compresses, or soak in a cool bath.  · Use calamine lotion to calm itchy skin.  · Ask your healthcare provider about over-the-counter pain relievers. If your pain is severe, your healthcare provider may prescribe stronger pain medicines.  What are the complications of shingles?  Shingles often goes away with no lasting effects. But some people have serious problems long after the blisters have healed:  · Postherpetic neuralgia. This is the most common complication. It is severe nerve pain at the place where the rash used to be. It can last for months, or even years after you have had shingles. Medicines can be prescribed to help relieve the pain and improve quality of life.  · Bacterial infection. Shingles blisters may become infected with bacteria. Antibiotic medicine is used to treat the infection.  · Eye problems. A person with shingles on the face should see his or her healthcare provider right away.  Shingles can cause serious problems with vision, and even blindness.  Very rarely shingles can also lead to pneumonia, hearing problems, brain inflammation, or even death.   When to seek medical care  Contact your healthcare provider if you experience any of the following:  · Symptoms that dont go away with treatment  · A rash or blisters near your eye  · Increased drainage, fever, or rash after treatment, or severe pain that doesnt go away   How can shingles be prevented?  You can only get shingles if you have had chicken pox in the past. Those who have never had chickenpox can get the virus from you. Although instead of developing shingles, the person may get chickenpox. Until your blisters form scabs, avoid contact with others, especially the following:  · Pregnant women who have never had chickenpox or the vaccine  · Infants who were born early (prematurely) or who had low weight at birth  · People with weak immune system (for example, people receiving chemotherapy for cancer, people who have had organ transplants, or people with HIV infections)     The shingles vaccine  If youre 60 years of age or older, ask your healthcare provider if you should receive the shingles vaccine. The vaccine makes it less likely that you will develop shingles. If you do develop shingles, your symptoms will likely be milder than if you hadnt been vaccinated. Note: Although the vaccine is licensed for people 50 years of age or older, the CDC does not recommend the vaccine for those who are 50 to 59 years old.   Date Last Reviewed: 10/1/2016  © 1944-9999 [employer]. [address] [place] [address], [place] [address]. All rights reserved. This information is not intended as a substitute for professional medical care. Always follow your healthcare professional's instructions.                    I have reviewed and confirmed nurses' notes for patient's medications, allergies, medical history, and surgical history.

## 2020-10-15 LAB
ALBUMIN SERPL ELPH-MCNC: 4.5 G/DL
ALP BLD-CCNC: 83 U/L
ALT SERPL-CCNC: 18 U/L
ANION GAP SERPL CALC-SCNC: 12 MMOL/L
AST SERPL-CCNC: 22 U/L
BILIRUB SERPL-MCNC: 0.8 MG/DL
BUN SERPL-MCNC: 29 MG/DL
CALCIUM SERPL-MCNC: 10.2 MG/DL
CHLORIDE SERPL-SCNC: 98 MMOL/L
CO2 SERPL-SCNC: 23 MMOL/L
CREAT SERPL-MCNC: 0.94 MG/DL
GLUCOSE SERPL-MCNC: 86 MG/DL
LDH SERPL-CCNC: 197 U/L
POTASSIUM SERPL-SCNC: 4.3 MMOL/L
PROT SERPL-MCNC: 8 G/DL
SODIUM SERPL-SCNC: 133 MMOL/L

## 2020-12-11 ENCOUNTER — OUTPATIENT (OUTPATIENT)
Dept: OUTPATIENT SERVICES | Facility: HOSPITAL | Age: 80
LOS: 1 days | Discharge: HOME | End: 2020-12-11

## 2020-12-11 DIAGNOSIS — D47.2 MONOCLONAL GAMMOPATHY: ICD-10-CM

## 2020-12-11 DIAGNOSIS — S82.92XA UNSPECIFIED FRACTURE OF LEFT LOWER LEG, INITIAL ENCOUNTER FOR CLOSED FRACTURE: Chronic | ICD-10-CM

## 2020-12-15 ENCOUNTER — RESULT REVIEW (OUTPATIENT)
Age: 80
End: 2020-12-15

## 2020-12-15 ENCOUNTER — APPOINTMENT (OUTPATIENT)
Dept: HEMATOLOGY ONCOLOGY | Facility: CLINIC | Age: 80
End: 2020-12-15
Payer: MEDICARE

## 2020-12-15 VITALS
WEIGHT: 110.23 LBS | HEIGHT: 63.07 IN | DIASTOLIC BLOOD PRESSURE: 94 MMHG | OXYGEN SATURATION: 100 % | SYSTOLIC BLOOD PRESSURE: 165 MMHG | RESPIRATION RATE: 16 BRPM | BODY MASS INDEX: 19.53 KG/M2 | TEMPERATURE: 97.1 F | HEART RATE: 75 BPM

## 2020-12-15 LAB
BASOPHILS # BLD AUTO: 0.02 K/UL — SIGNIFICANT CHANGE UP (ref 0–0.2)
BASOPHILS NFR BLD AUTO: 0.3 % — SIGNIFICANT CHANGE UP (ref 0–2)
EOSINOPHIL # BLD AUTO: 0.08 K/UL — SIGNIFICANT CHANGE UP (ref 0–0.5)
EOSINOPHIL NFR BLD AUTO: 1.1 % — SIGNIFICANT CHANGE UP (ref 0–6)
HCT VFR BLD CALC: 37.3 % — SIGNIFICANT CHANGE UP (ref 34.5–45)
HGB BLD-MCNC: 12.1 G/DL — SIGNIFICANT CHANGE UP (ref 11.5–15.5)
IMM GRANULOCYTES NFR BLD AUTO: 0.3 % — SIGNIFICANT CHANGE UP (ref 0–1.5)
LYMPHOCYTES # BLD AUTO: 2.26 K/UL — SIGNIFICANT CHANGE UP (ref 1–3.3)
LYMPHOCYTES # BLD AUTO: 30.7 % — SIGNIFICANT CHANGE UP (ref 13–44)
MCHC RBC-ENTMCNC: 28.7 PG — SIGNIFICANT CHANGE UP (ref 27–34)
MCHC RBC-ENTMCNC: 32.4 G/DL — SIGNIFICANT CHANGE UP (ref 32–36)
MCV RBC AUTO: 88.4 FL — SIGNIFICANT CHANGE UP (ref 80–100)
MONOCYTES # BLD AUTO: 0.37 K/UL — SIGNIFICANT CHANGE UP (ref 0–0.9)
MONOCYTES NFR BLD AUTO: 5 % — SIGNIFICANT CHANGE UP (ref 2–14)
NEUTROPHILS # BLD AUTO: 4.6 K/UL — SIGNIFICANT CHANGE UP (ref 1.8–7.4)
NEUTROPHILS NFR BLD AUTO: 62.6 % — SIGNIFICANT CHANGE UP (ref 43–77)
NRBC # BLD: 0 /100 WBCS — SIGNIFICANT CHANGE UP (ref 0–0)
PLATELET # BLD AUTO: 655 K/UL — HIGH (ref 150–400)
RBC # BLD: 4.22 M/UL — SIGNIFICANT CHANGE UP (ref 3.8–5.2)
RBC # FLD: 15.1 % — HIGH (ref 10.3–14.5)
WBC # BLD: 7.35 K/UL — SIGNIFICANT CHANGE UP (ref 3.8–10.5)
WBC # FLD AUTO: 7.35 K/UL — SIGNIFICANT CHANGE UP (ref 3.8–10.5)

## 2020-12-15 PROCEDURE — 99214 OFFICE O/P EST MOD 30 MIN: CPT

## 2020-12-15 PROCEDURE — 99072 ADDL SUPL MATRL&STAF TM PHE: CPT

## 2020-12-15 RX ORDER — CHLORHEXIDINE GLUCONATE 4 %
325 (65 FE) LIQUID (ML) TOPICAL DAILY
Refills: 0 | Status: DISCONTINUED | COMMUNITY
Start: 2019-12-27 | End: 2020-12-15

## 2020-12-16 LAB
ALBUMIN MFR SERPL ELPH: 54 %
ALBUMIN SERPL ELPH-MCNC: 4.4 G/DL
ALBUMIN SERPL-MCNC: 4.3 G/DL
ALBUMIN/GLOB SERPL: 1.2 RATIO
ALP BLD-CCNC: 84 U/L
ALPHA1 GLOB MFR SERPL ELPH: 3.6 %
ALPHA1 GLOB SERPL ELPH-MCNC: 0.3 G/DL
ALPHA2 GLOB MFR SERPL ELPH: 7.5 %
ALPHA2 GLOB SERPL ELPH-MCNC: 0.6 G/DL
ALT SERPL-CCNC: 17 U/L
ANION GAP SERPL CALC-SCNC: 11 MMOL/L
AST SERPL-CCNC: 27 U/L
B-GLOBULIN MFR SERPL ELPH: 12.8 %
B-GLOBULIN SERPL ELPH-MCNC: 1 G/DL
BILIRUB SERPL-MCNC: 0.7 MG/DL
BUN SERPL-MCNC: 27 MG/DL
CALCIUM SERPL-MCNC: 10.5 MG/DL
CHLORIDE SERPL-SCNC: 101 MMOL/L
CO2 SERPL-SCNC: 24 MMOL/L
CREAT SERPL-MCNC: 1.15 MG/DL
DEPRECATED KAPPA LC FREE/LAMBDA SER: 1.4 RATIO
GAMMA GLOB FLD ELPH-MCNC: 1.7 G/DL
GAMMA GLOB MFR SERPL ELPH: 22.1 %
GLUCOSE SERPL-MCNC: 86 MG/DL
IGA SER QL IEP: 555 MG/DL
IGG SER QL IEP: 2044 MG/DL
IGM SER QL IEP: 180 MG/DL
INTERPRETATION SERPL IEP-IMP: NORMAL
KAPPA LC CSF-MCNC: 4.98 MG/DL
KAPPA LC SERPL-MCNC: 6.97 MG/DL
LDH SERPL-CCNC: 244 U/L
M PROTEIN MFR SERPL ELPH: NORMAL
M PROTEIN SPEC IFE-MCNC: NORMAL
MONOCLON BAND OBS SERPL: NORMAL
POTASSIUM SERPL-SCNC: 5.2 MMOL/L
PROT SERPL-MCNC: 7.9 G/DL
SODIUM SERPL-SCNC: 136 MMOL/L

## 2020-12-28 NOTE — RESULTS/DATA
[FreeTextEntry1] : CBC today\par WBC 7.35\par HGB 12.1\par HCT 37.3\par ,000 (decreased from 671,000)\par \par 8/28/20\par MPL Exon 10 +\par CALR negative\par CMP Ca 10.9\par \par SPEP M weak gamma\par Quant IgG 1871, A 529, M 159\par SFLC k 7.38, lambda 4.45

## 2020-12-28 NOTE — HISTORY OF PRESENT ILLNESS
[Disease:__________________________] : Disease: [unfilled] [de-identified] : Factor V Leiden heterozygote\par LLE DVT post-op\par 8/2019 PE after fracturing hip and a long flight; IVC filter placed and removed. 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  Her  passed away from liver cancer 6/2020.  She had right cataract surgery last week.  She c/o occas vertigo; she did exercises with improvement in dizziness.  She denies any fever, night sweats, swollen glands, headaches, new visual problems, bleeding,  bruising, skeletal pain,chest pain, shortness of breath, abdominal pain, melena, BRBPR, leg pain/swelling, foot pain or pruritus. Her weight is stable. RA is controlled. She is off iron supplements.  She has not received influenza vaccine this season.  She works PT doing accounting.

## 2020-12-28 NOTE — ASSESSMENT
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 She has a prior history of provoked DVT and is heterozygous for Factor V Leiden. She suffered a PE following fracture of her hip and a long airplane flight. This event was provoked. I do not believe that lifelong Eliquis anticoagulation is indicated and I am concerned that the risks of bleeding in this elderly woman would outweigh the benefit. Completed 6 months of Eliquis and then switched to ASA prophylaxis as before. 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 Reviewed diagnosis, therapy, precautions and excellent prognosis.\par \par Plan:\par Continue ASA daily \par Jobst stockings\par Recommend Flu vaccine\par Recommend GI consult for routine colonoscopy\par Check CMP, LDH today \par Repeat CBC in 2 months\par RTO in 6 months. \par Discussed with Dr. Ching.

## 2021-02-13 ENCOUNTER — OUTPATIENT (OUTPATIENT)
Dept: OUTPATIENT SERVICES | Facility: HOSPITAL | Age: 81
LOS: 1 days | Discharge: HOME | End: 2021-02-13

## 2021-02-13 DIAGNOSIS — D47.2 MONOCLONAL GAMMOPATHY: ICD-10-CM

## 2021-02-13 DIAGNOSIS — S82.92XA UNSPECIFIED FRACTURE OF LEFT LOWER LEG, INITIAL ENCOUNTER FOR CLOSED FRACTURE: Chronic | ICD-10-CM

## 2021-02-17 ENCOUNTER — LABORATORY RESULT (OUTPATIENT)
Age: 81
End: 2021-02-17

## 2021-02-17 ENCOUNTER — RESULT REVIEW (OUTPATIENT)
Age: 81
End: 2021-02-17

## 2021-02-17 ENCOUNTER — APPOINTMENT (OUTPATIENT)
Dept: HEMATOLOGY ONCOLOGY | Facility: CLINIC | Age: 81
End: 2021-02-17

## 2021-02-17 LAB
BASOPHILS # BLD AUTO: 0.03 K/UL — SIGNIFICANT CHANGE UP (ref 0–0.2)
BASOPHILS NFR BLD AUTO: 0.4 % — SIGNIFICANT CHANGE UP (ref 0–2)
EOSINOPHIL # BLD AUTO: 0.08 K/UL — SIGNIFICANT CHANGE UP (ref 0–0.5)
EOSINOPHIL NFR BLD AUTO: 1.1 % — SIGNIFICANT CHANGE UP (ref 0–6)
HCT VFR BLD CALC: 36.6 % — SIGNIFICANT CHANGE UP (ref 34.5–45)
HGB BLD-MCNC: 11.8 G/DL — SIGNIFICANT CHANGE UP (ref 11.5–15.5)
IMM GRANULOCYTES NFR BLD AUTO: 0.4 % — SIGNIFICANT CHANGE UP (ref 0–1.5)
LYMPHOCYTES # BLD AUTO: 1.71 K/UL — SIGNIFICANT CHANGE UP (ref 1–3.3)
LYMPHOCYTES # BLD AUTO: 23 % — SIGNIFICANT CHANGE UP (ref 13–44)
MCHC RBC-ENTMCNC: 28.6 PG — SIGNIFICANT CHANGE UP (ref 27–34)
MCHC RBC-ENTMCNC: 32.2 G/DL — SIGNIFICANT CHANGE UP (ref 32–36)
MCV RBC AUTO: 88.6 FL — SIGNIFICANT CHANGE UP (ref 80–100)
MONOCYTES # BLD AUTO: 0.34 K/UL — SIGNIFICANT CHANGE UP (ref 0–0.9)
MONOCYTES NFR BLD AUTO: 4.6 % — SIGNIFICANT CHANGE UP (ref 2–14)
NEUTROPHILS # BLD AUTO: 5.25 K/UL — SIGNIFICANT CHANGE UP (ref 1.8–7.4)
NEUTROPHILS NFR BLD AUTO: 70.5 % — SIGNIFICANT CHANGE UP (ref 43–77)
NRBC # BLD: 0 /100 WBCS — SIGNIFICANT CHANGE UP (ref 0–0)
PLATELET # BLD AUTO: 657 K/UL — HIGH (ref 150–400)
RBC # BLD: 4.13 M/UL — SIGNIFICANT CHANGE UP (ref 3.8–5.2)
RBC # FLD: 14.8 % — HIGH (ref 10.3–14.5)
WBC # BLD: 7.44 K/UL — SIGNIFICANT CHANGE UP (ref 3.8–10.5)
WBC # FLD AUTO: 7.44 K/UL — SIGNIFICANT CHANGE UP (ref 3.8–10.5)

## 2021-04-22 NOTE — PRE-OP CHECKLIST - ANTIBIOTIC
"Group Therapy Documentation    PATIENT'S NAME: Idris Woodson  MRN:   [id number]  :   1964  ACCT. NUMBER: [id number]  DATE OF SERVICE: 21  START TIME:  5:30 PM  END TIME:  7:30 PM  FACILITATOR(S): Ricardo Britton LADC  TOPIC: BEH Group Therapy  Number of patients attending the group:  9    Group Length:  2 Hours    Group Therapy Type: Life skill(s)    Summary of Group / Topics Discussed:    12 Step Sober Support overview, how to find meetings, pros and cons discussion of attending any kind of support groups.     Group Attendance:  Attended group session    Patient's response to the group topic/interactions:  confronted peers appropriately and cooperative with task    Patient appeared to be Engaged.        Client specific details:  Client has no personal experience with any kind of sober support groups. Client did ask questions of this counselor and peers who do have experience regarding \"how to start\" finding and attending. Dimension 4, 5.6  " n/a

## 2021-06-02 ENCOUNTER — APPOINTMENT (OUTPATIENT)
Dept: ORTHOPEDIC SURGERY | Facility: CLINIC | Age: 81
End: 2021-06-02
Payer: COMMERCIAL

## 2021-06-02 VITALS
DIASTOLIC BLOOD PRESSURE: 96 MMHG | WEIGHT: 106 LBS | BODY MASS INDEX: 18.78 KG/M2 | HEIGHT: 63 IN | HEART RATE: 86 BPM | TEMPERATURE: 97.7 F | SYSTOLIC BLOOD PRESSURE: 148 MMHG

## 2021-06-02 DIAGNOSIS — S22.009A UNSPECIFIED FRACTURE OF UNSPECIFIED THORACIC VERTEBRA, INITIAL ENCOUNTER FOR CLOSED FRACTURE: ICD-10-CM

## 2021-06-02 DIAGNOSIS — M54.6 PAIN IN THORACIC SPINE: ICD-10-CM

## 2021-06-02 PROCEDURE — 99072 ADDL SUPL MATRL&STAF TM PHE: CPT

## 2021-06-02 PROCEDURE — 99204 OFFICE O/P NEW MOD 45 MIN: CPT

## 2021-06-02 PROCEDURE — 72050 X-RAY EXAM NECK SPINE 4/5VWS: CPT

## 2021-06-02 PROCEDURE — 72070 X-RAY EXAM THORAC SPINE 2VWS: CPT

## 2021-06-09 ENCOUNTER — APPOINTMENT (OUTPATIENT)
Dept: ORTHOPEDIC SURGERY | Facility: CLINIC | Age: 81
End: 2021-06-09

## 2021-06-09 ENCOUNTER — NON-APPOINTMENT (OUTPATIENT)
Age: 81
End: 2021-06-09

## 2021-06-15 ENCOUNTER — APPOINTMENT (OUTPATIENT)
Dept: HEMATOLOGY ONCOLOGY | Facility: CLINIC | Age: 81
End: 2021-06-15

## 2021-08-10 ENCOUNTER — OUTPATIENT (OUTPATIENT)
Dept: OUTPATIENT SERVICES | Facility: HOSPITAL | Age: 81
LOS: 1 days | Discharge: HOME | End: 2021-08-10

## 2021-08-10 DIAGNOSIS — D47.2 MONOCLONAL GAMMOPATHY: ICD-10-CM

## 2021-08-10 DIAGNOSIS — S82.92XA UNSPECIFIED FRACTURE OF LEFT LOWER LEG, INITIAL ENCOUNTER FOR CLOSED FRACTURE: Chronic | ICD-10-CM

## 2021-08-12 ENCOUNTER — APPOINTMENT (OUTPATIENT)
Dept: HEMATOLOGY ONCOLOGY | Facility: CLINIC | Age: 81
End: 2021-08-12
Payer: MEDICARE

## 2021-08-12 ENCOUNTER — RESULT REVIEW (OUTPATIENT)
Age: 81
End: 2021-08-12

## 2021-08-12 ENCOUNTER — LABORATORY RESULT (OUTPATIENT)
Age: 81
End: 2021-08-12

## 2021-08-12 VITALS
TEMPERATURE: 100.8 F | RESPIRATION RATE: 16 BRPM | WEIGHT: 106.92 LBS | OXYGEN SATURATION: 98 % | BODY MASS INDEX: 19.18 KG/M2 | SYSTOLIC BLOOD PRESSURE: 144 MMHG | HEART RATE: 98 BPM | HEIGHT: 62.76 IN | DIASTOLIC BLOOD PRESSURE: 90 MMHG

## 2021-08-12 LAB
BASOPHILS # BLD AUTO: 0.06 K/UL — SIGNIFICANT CHANGE UP (ref 0–0.2)
BASOPHILS NFR BLD AUTO: 0.7 % — SIGNIFICANT CHANGE UP (ref 0–2)
EOSINOPHIL # BLD AUTO: 0.14 K/UL — SIGNIFICANT CHANGE UP (ref 0–0.5)
EOSINOPHIL NFR BLD AUTO: 1.7 % — SIGNIFICANT CHANGE UP (ref 0–6)
HCT VFR BLD CALC: 32.3 % — LOW (ref 34.5–45)
HGB BLD-MCNC: 10.3 G/DL — LOW (ref 11.5–15.5)
IMM GRANULOCYTES NFR BLD AUTO: 0.4 % — SIGNIFICANT CHANGE UP (ref 0–1.5)
LYMPHOCYTES # BLD AUTO: 2.38 K/UL — SIGNIFICANT CHANGE UP (ref 1–3.3)
LYMPHOCYTES # BLD AUTO: 28.2 % — SIGNIFICANT CHANGE UP (ref 13–44)
MCHC RBC-ENTMCNC: 28.9 PG — SIGNIFICANT CHANGE UP (ref 27–34)
MCHC RBC-ENTMCNC: 31.9 G/DL — LOW (ref 32–36)
MCV RBC AUTO: 90.5 FL — SIGNIFICANT CHANGE UP (ref 80–100)
MONOCYTES # BLD AUTO: 0.44 K/UL — SIGNIFICANT CHANGE UP (ref 0–0.9)
MONOCYTES NFR BLD AUTO: 5.2 % — SIGNIFICANT CHANGE UP (ref 2–14)
NEUTROPHILS # BLD AUTO: 5.39 K/UL — SIGNIFICANT CHANGE UP (ref 1.8–7.4)
NEUTROPHILS NFR BLD AUTO: 63.8 % — SIGNIFICANT CHANGE UP (ref 43–77)
NRBC # BLD: 0 /100 WBCS — SIGNIFICANT CHANGE UP (ref 0–0)
PLATELET # BLD AUTO: 718 K/UL — HIGH (ref 150–400)
RBC # BLD: 3.57 M/UL — LOW (ref 3.8–5.2)
RBC # FLD: 15.9 % — HIGH (ref 10.3–14.5)
WBC # BLD: 8.44 K/UL — SIGNIFICANT CHANGE UP (ref 3.8–10.5)
WBC # FLD AUTO: 8.44 K/UL — SIGNIFICANT CHANGE UP (ref 3.8–10.5)

## 2021-08-12 PROCEDURE — 99213 OFFICE O/P EST LOW 20 MIN: CPT

## 2021-08-18 NOTE — ASSESSMENT
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 She has a prior history of provoked DVT and is heterozygous for Factor V Leiden. She suffered a PE following fracture of her hip and a long airplane flight. This event was provoked. I do not believe that lifelong Eliquis anticoagulation is indicated and I am concerned that the risks of bleeding in this elderly woman would outweigh the benefit. 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 Reviewed diagnosis, therapy, precautions and excellent prognosis.  Pt is post op from R femur surgery and recovering well.  Hgb is lower, will check in 2 weeks; most likely d/t femur surgery.   Will monitor for now. MM labs stable, will follow. \par \par Plan:\par ASA\par Jobst stockings\par CMP, LDH\par RTC 2 months. \par CBC check in one month. \par

## 2021-08-18 NOTE — CONSULT LETTER
[Dear  ___] : Dear  [unfilled], [Courtesy Letter:] : I had the pleasure of seeing your patient, [unfilled], in my office today. [Sincerely,] : Sincerely, [DrKyle  ___] : Dr. PALOMO [FreeTextEntry2] : Bernice Chaudhary MD [FreeTextEntry3] : Octaviano Ching M.D., FACP\par Professor of Medicine\par [facility] [employer]\par Associate Chief, Division of Hematology\par [facility]\par [facility]\par [address] [place] [address]\[place] [address]\par [phone]\par \par \par \par

## 2021-08-18 NOTE — HISTORY OF PRESENT ILLNESS
[Disease:__________________________] : Disease: [unfilled] [de-identified] : Factor V Leiden heterozygote\par LLE DVT post-op\par 8/2019 PE after fracturing hip and a long flight; IVC filter placed and removed. 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  She notes no fever, night sweats, swollen glands, headaches, new visual problems, bleeding,  bruising, skeletal pain,chest pain, shortness of breath, abdominal pain, melena, BRBPR, leg pain/swelling, foot pain, pruritus. Weight stable. RA is controlled.   Pt had R femur surgery and THR replacement in 6/21.  Is doing well and doing PT.  Walking around well with use of cane.   Pt denies any dizziness.

## 2021-08-27 LAB
ALBUMIN MFR SERPL ELPH: 51.8 %
ALBUMIN SERPL ELPH-MCNC: 4.4 G/DL
ALBUMIN SERPL-MCNC: 4.3 G/DL
ALBUMIN/GLOB SERPL: 1.1 RATIO
ALP BLD-CCNC: 138 U/L
ALPHA1 GLOB MFR SERPL ELPH: 3.7 %
ALPHA1 GLOB SERPL ELPH-MCNC: 0.3 G/DL
ALPHA2 GLOB MFR SERPL ELPH: 7.7 %
ALPHA2 GLOB SERPL ELPH-MCNC: 0.6 G/DL
ALT SERPL-CCNC: 17 U/L
ANION GAP SERPL CALC-SCNC: 14 MMOL/L
AST SERPL-CCNC: 24 U/L
B-GLOBULIN MFR SERPL ELPH: 12.4 %
B-GLOBULIN SERPL ELPH-MCNC: 1 G/DL
BILIRUB SERPL-MCNC: 0.5 MG/DL
BUN SERPL-MCNC: 23 MG/DL
CALCIUM SERPL-MCNC: 10.6 MG/DL
CHLORIDE SERPL-SCNC: 98 MMOL/L
CO2 SERPL-SCNC: 22 MMOL/L
CREAT SERPL-MCNC: 1.05 MG/DL
DEPRECATED KAPPA LC FREE/LAMBDA SER: 0.96 RATIO
DEPRECATED KAPPA LC FREE/LAMBDA SER: 0.96 RATIO
GAMMA GLOB FLD ELPH-MCNC: 2 G/DL
GAMMA GLOB MFR SERPL ELPH: 24.4 %
GLUCOSE SERPL-MCNC: 140 MG/DL
IGA SER QL IEP: 559 MG/DL
IGG SER QL IEP: 2132 MG/DL
IGM SER QL IEP: 178 MG/DL
INTERPRETATION SERPL IEP-IMP: NORMAL
KAPPA LC CSF-MCNC: 4.91 MG/DL
KAPPA LC CSF-MCNC: 4.91 MG/DL
KAPPA LC SERPL-MCNC: 4.7 MG/DL
KAPPA LC SERPL-MCNC: 4.7 MG/DL
LDH SERPL-CCNC: 224 U/L
M PROTEIN MFR SERPL ELPH: NORMAL
MONOCLON BAND OBS SERPL: NORMAL
POTASSIUM SERPL-SCNC: 4.8 MMOL/L
PROT SERPL-MCNC: 8.3 G/DL
SODIUM SERPL-SCNC: 134 MMOL/L

## 2021-09-15 ENCOUNTER — TRANSCRIPTION ENCOUNTER (OUTPATIENT)
Age: 81
End: 2021-09-15

## 2021-10-06 ENCOUNTER — OUTPATIENT (OUTPATIENT)
Dept: OUTPATIENT SERVICES | Facility: HOSPITAL | Age: 81
LOS: 1 days | Discharge: HOME | End: 2021-10-06

## 2021-10-06 DIAGNOSIS — S82.92XA UNSPECIFIED FRACTURE OF LEFT LOWER LEG, INITIAL ENCOUNTER FOR CLOSED FRACTURE: Chronic | ICD-10-CM

## 2021-10-06 DIAGNOSIS — D47.2 MONOCLONAL GAMMOPATHY: ICD-10-CM

## 2021-10-08 ENCOUNTER — RESULT REVIEW (OUTPATIENT)
Age: 81
End: 2021-10-08

## 2021-10-08 ENCOUNTER — APPOINTMENT (OUTPATIENT)
Dept: HEMATOLOGY ONCOLOGY | Facility: CLINIC | Age: 81
End: 2021-10-08
Payer: MEDICARE

## 2021-10-08 VITALS
BODY MASS INDEX: 20.13 KG/M2 | HEART RATE: 83 BPM | SYSTOLIC BLOOD PRESSURE: 152 MMHG | RESPIRATION RATE: 16 BRPM | HEIGHT: 61.42 IN | TEMPERATURE: 96.4 F | WEIGHT: 108.02 LBS | DIASTOLIC BLOOD PRESSURE: 88 MMHG

## 2021-10-08 LAB
BASOPHILS # BLD AUTO: 0.05 K/UL — SIGNIFICANT CHANGE UP (ref 0–0.2)
BASOPHILS NFR BLD AUTO: 0.6 % — SIGNIFICANT CHANGE UP (ref 0–2)
EOSINOPHIL # BLD AUTO: 0.09 K/UL — SIGNIFICANT CHANGE UP (ref 0–0.5)
EOSINOPHIL NFR BLD AUTO: 1 % — SIGNIFICANT CHANGE UP (ref 0–6)
HCT VFR BLD CALC: 36.8 % — SIGNIFICANT CHANGE UP (ref 34.5–45)
HGB BLD-MCNC: 12.2 G/DL — SIGNIFICANT CHANGE UP (ref 11.5–15.5)
IMM GRANULOCYTES NFR BLD AUTO: 0.7 % — SIGNIFICANT CHANGE UP (ref 0–1.5)
LYMPHOCYTES # BLD AUTO: 2.46 K/UL — SIGNIFICANT CHANGE UP (ref 1–3.3)
LYMPHOCYTES # BLD AUTO: 27.3 % — SIGNIFICANT CHANGE UP (ref 13–44)
MCHC RBC-ENTMCNC: 28.5 PG — SIGNIFICANT CHANGE UP (ref 27–34)
MCHC RBC-ENTMCNC: 33.2 G/DL — SIGNIFICANT CHANGE UP (ref 32–36)
MCV RBC AUTO: 86 FL — SIGNIFICANT CHANGE UP (ref 80–100)
MONOCYTES # BLD AUTO: 0.46 K/UL — SIGNIFICANT CHANGE UP (ref 0–0.9)
MONOCYTES NFR BLD AUTO: 5.1 % — SIGNIFICANT CHANGE UP (ref 2–14)
NEUTROPHILS # BLD AUTO: 5.9 K/UL — SIGNIFICANT CHANGE UP (ref 1.8–7.4)
NEUTROPHILS NFR BLD AUTO: 65.3 % — SIGNIFICANT CHANGE UP (ref 43–77)
NRBC # BLD: 0 /100 WBCS — SIGNIFICANT CHANGE UP (ref 0–0)
PLATELET # BLD AUTO: 712 K/UL — HIGH (ref 150–400)
RBC # BLD: 4.28 M/UL — SIGNIFICANT CHANGE UP (ref 3.8–5.2)
RBC # FLD: 14.6 % — HIGH (ref 10.3–14.5)
WBC # BLD: 9.02 K/UL — SIGNIFICANT CHANGE UP (ref 3.8–10.5)
WBC # FLD AUTO: 9.02 K/UL — SIGNIFICANT CHANGE UP (ref 3.8–10.5)

## 2021-10-08 PROCEDURE — 99214 OFFICE O/P EST MOD 30 MIN: CPT

## 2021-10-08 NOTE — CONSULT LETTER
[Dear  ___] : Dear  [unfilled], [Courtesy Letter:] : I had the pleasure of seeing your patient, [unfilled], in my office today. [Sincerely,] : Sincerely, [DrKyle  ___] : Dr. PALOMO [FreeTextEntry2] : Bernice Chaudhary MD [FreeTextEntry3] : Octaviano Ching M.D., FACP\par Professor of Medicine\par [facility] [employer]\par Associate Chief, Division of Hematology\par [facility]\par [facility]\par [address] [place] [address]\[place] [address]\par [phone]\par \par \par \par

## 2021-10-08 NOTE — ASSESSMENT
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 She has a prior history of provoked DVT and is heterozygous for Factor V Leiden. She suffered a PE following fracture of her hip and a long airplane flight. This event was provoked. I do not believe that lifelong Eliquis anticoagulation is indicated and I am concerned that the risks of bleeding in this elderly woman would outweigh the benefit. Completed 6 months of Eliquis and then switched to ASA prophylaxis as before. 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 \par \par Plan:\par ASA\par Jobst stockings\par D/C iron supplement \par CMP\par RTC 2 months. \par \par

## 2021-10-08 NOTE — PHYSICAL EXAM
[Fully active, able to carry on all pre-disease performance without restriction] : Status 0 - Fully active, able to carry on all pre-disease performance without restriction [Normal] : affect appropriate [de-identified] : RRR. S1S2 normal. Gr 2/6 KARISHMA apex to left axilla. No gallops.

## 2021-10-08 NOTE — ADDENDUM
[FreeTextEntry1] : I, Angy Rivas, acted solely as a scribe for Dr. Octaviano Ching on 10/08/2021. All medical entries made by the Scribe were at my, Dr. Octaviano Ching's, direction and personally dictated by me on 10/08/2021. I have reviewed the chart and agree that the record accurately reflects my personal performance of the history, physical exam, assessment and plan. I have also personally directed, reviewed, and agreed with the chart.

## 2021-10-08 NOTE — RESULTS/DATA
[FreeTextEntry1] : 10/8/21\par WBC 9020 Hgb 12.2 Hct 36.8 Platelets 712,000 Diff normal\par \par 8/12/21\par CMP Na 134, Glu 140, Ca 10.6, ALK Phos 138, eGFR 50\par \par SPEP: weak gamma-migrating paraprotein identified. \par SPIEP: Weak IgG kappa band identified. \par IgG 2132, A 559, M 178\par SFLC kappa 4.70, lambda 4.91, ratio 0.96\par \par 2/17/21\par PTH 30\par TSH 1.59\par Vit D 59.1\par \par \par \par \par

## 2021-10-08 NOTE — HISTORY OF PRESENT ILLNESS
[Disease:__________________________] : Disease: [unfilled] [de-identified] : Factor V Leiden heterozygote\par LLE DVT post-op\par 8/2019 PE after fracturing hip and a long flight; IVC filter placed and removed. 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 Doing much better since fracturing her femur. Is ambulating with a cane. Chronic back pain better. Vertigo improving with PT. No other complaints. She notes no fever, night sweats, swollen glands, headaches, new visual problems, bleeding,  bruising, skeletal pain, chest pain, shortness of breath, abdominal pain, melena, BRBPR, leg pain/swelling, foot pain, pruritus. Weight stable. RA is controlled.

## 2021-10-11 LAB
ALBUMIN SERPL ELPH-MCNC: 4.5 G/DL
ALP BLD-CCNC: 124 U/L
ALT SERPL-CCNC: 19 U/L
ANION GAP SERPL CALC-SCNC: 13 MMOL/L
AST SERPL-CCNC: 25 U/L
BILIRUB SERPL-MCNC: 0.4 MG/DL
BUN SERPL-MCNC: 29 MG/DL
CALCIUM SERPL-MCNC: 10.2 MG/DL
CHLORIDE SERPL-SCNC: 100 MMOL/L
CO2 SERPL-SCNC: 25 MMOL/L
CREAT SERPL-MCNC: 1.05 MG/DL
GLUCOSE SERPL-MCNC: 96 MG/DL
POTASSIUM SERPL-SCNC: 4.5 MMOL/L
PROT SERPL-MCNC: 8.2 G/DL
SODIUM SERPL-SCNC: 138 MMOL/L

## 2021-12-02 ENCOUNTER — OUTPATIENT (OUTPATIENT)
Dept: OUTPATIENT SERVICES | Facility: HOSPITAL | Age: 81
LOS: 1 days | Discharge: HOME | End: 2021-12-02

## 2021-12-02 DIAGNOSIS — D47.2 MONOCLONAL GAMMOPATHY: ICD-10-CM

## 2021-12-02 DIAGNOSIS — S82.92XA UNSPECIFIED FRACTURE OF LEFT LOWER LEG, INITIAL ENCOUNTER FOR CLOSED FRACTURE: Chronic | ICD-10-CM

## 2021-12-03 ENCOUNTER — RESULT REVIEW (OUTPATIENT)
Age: 81
End: 2021-12-03

## 2021-12-03 ENCOUNTER — APPOINTMENT (OUTPATIENT)
Dept: HEMATOLOGY ONCOLOGY | Facility: CLINIC | Age: 81
End: 2021-12-03
Payer: MEDICARE

## 2021-12-03 ENCOUNTER — LABORATORY RESULT (OUTPATIENT)
Age: 81
End: 2021-12-03

## 2021-12-03 VITALS
WEIGHT: 108.01 LBS | SYSTOLIC BLOOD PRESSURE: 137 MMHG | HEART RATE: 74 BPM | DIASTOLIC BLOOD PRESSURE: 83 MMHG | BODY MASS INDEX: 20.13 KG/M2 | RESPIRATION RATE: 15 BRPM | TEMPERATURE: 97 F | OXYGEN SATURATION: 96 %

## 2021-12-03 LAB
BASOPHILS # BLD AUTO: 0.03 K/UL — SIGNIFICANT CHANGE UP (ref 0–0.2)
BASOPHILS NFR BLD AUTO: 0.4 % — SIGNIFICANT CHANGE UP (ref 0–2)
EOSINOPHIL # BLD AUTO: 0.12 K/UL — SIGNIFICANT CHANGE UP (ref 0–0.5)
EOSINOPHIL NFR BLD AUTO: 1.6 % — SIGNIFICANT CHANGE UP (ref 0–6)
HCT VFR BLD CALC: 35.9 % — SIGNIFICANT CHANGE UP (ref 34.5–45)
HGB BLD-MCNC: 11.7 G/DL — SIGNIFICANT CHANGE UP (ref 11.5–15.5)
IMM GRANULOCYTES NFR BLD AUTO: 0.3 % — SIGNIFICANT CHANGE UP (ref 0–1.5)
LYMPHOCYTES # BLD AUTO: 2.44 K/UL — SIGNIFICANT CHANGE UP (ref 1–3.3)
LYMPHOCYTES # BLD AUTO: 31.7 % — SIGNIFICANT CHANGE UP (ref 13–44)
MCHC RBC-ENTMCNC: 28.5 PG — SIGNIFICANT CHANGE UP (ref 27–34)
MCHC RBC-ENTMCNC: 32.6 G/DL — SIGNIFICANT CHANGE UP (ref 32–36)
MCV RBC AUTO: 87.3 FL — SIGNIFICANT CHANGE UP (ref 80–100)
MONOCYTES # BLD AUTO: 0.5 K/UL — SIGNIFICANT CHANGE UP (ref 0–0.9)
MONOCYTES NFR BLD AUTO: 6.5 % — SIGNIFICANT CHANGE UP (ref 2–14)
NEUTROPHILS # BLD AUTO: 4.59 K/UL — SIGNIFICANT CHANGE UP (ref 1.8–7.4)
NEUTROPHILS NFR BLD AUTO: 59.5 % — SIGNIFICANT CHANGE UP (ref 43–77)
NRBC # BLD: 0 /100 WBCS — SIGNIFICANT CHANGE UP (ref 0–0)
PLATELET # BLD AUTO: 640 K/UL — HIGH (ref 150–400)
RBC # BLD: 4.11 M/UL — SIGNIFICANT CHANGE UP (ref 3.8–5.2)
RBC # FLD: 15.2 % — HIGH (ref 10.3–14.5)
WBC # BLD: 7.7 K/UL — SIGNIFICANT CHANGE UP (ref 3.8–10.5)
WBC # FLD AUTO: 7.7 K/UL — SIGNIFICANT CHANGE UP (ref 3.8–10.5)

## 2021-12-03 PROCEDURE — 99213 OFFICE O/P EST LOW 20 MIN: CPT

## 2021-12-03 RX ORDER — CHLORHEXIDINE GLUCONATE 4 %
325 (65 FE) LIQUID (ML) TOPICAL DAILY
Qty: 100 | Refills: 1 | Status: DISCONTINUED | COMMUNITY
Start: 2021-10-08 | End: 2021-12-03

## 2021-12-03 NOTE — ASSESSMENT
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 She has a prior history of provoked DVT and is heterozygous for Factor V Leiden. She suffered a PE following fracture of her hip and a long airplane flight. This event was provoked. I do not believe that lifelong Eliquis anticoagulation is indicated and I am concerned that the risks of bleeding in this elderly woman would outweigh the benefit. Completed 6 months of Eliquis and then switched to ASA prophylaxis as before. 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 \par \par Plan:\par ASA\par Jobst stockings\par CMP, LDH, SPEP, Quant Ig, SFLC\par RTC 2 months. \par \par

## 2021-12-03 NOTE — PHYSICAL EXAM
[Fully active, able to carry on all pre-disease performance without restriction] : Status 0 - Fully active, able to carry on all pre-disease performance without restriction [Normal] : affect appropriate [de-identified] : RRR. S1S2 normal. Gr 2/6 KARISHMA apex to left axilla. No gallops.

## 2021-12-03 NOTE — CONSULT LETTER
[Dear  ___] : Dear  [unfilled], [Courtesy Letter:] : I had the pleasure of seeing your patient, [unfilled], in my office today. [Sincerely,] : Sincerely, [DrKyle  ___] : Dr. PALOMO [FreeTextEntry2] : Bernice Chaudhary MD [FreeTextEntry3] : Octaviano Ching M.D., FACP\par Professor of Medicine\par [facility] [employer]\par Associate Chief, Division of Hematology\par [facility]\par [facility]\par [address] [place] [address]\[place] [address]\par [phone]\par \par \par \par

## 2021-12-03 NOTE — RESULTS/DATA
[FreeTextEntry1] : WBC 7700 Hgb 11.7 Hct 35.9 Platelets 640,000 Diff normal\par \par 10/8/21\par CMP BUN 29, ALK Phos 124, eGFR 50

## 2021-12-03 NOTE — HISTORY OF PRESENT ILLNESS
[Disease:__________________________] : Disease: [unfilled] [de-identified] : Factor V Leiden heterozygote\par LLE DVT post-op\par 8/2019 PE after fracturing hip and a long flight; IVC filter placed and removed. 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 Femur fracture healing well. Is still ambulating with a cane. Doing PT. Chronic back pain better. Vertigo also better.  Has dry eyes. No other complaints. She notes no fever, night sweats, swollen glands, headaches, new visual problems, bleeding, bruising, skeletal pain, chest pain, shortness of breath, abdominal pain, melena, BRBPR, leg pain/swelling, foot pain, pruritus. Weight stable. RA is controlled. Recently had a UTI. Finished course of antibiotics yesterday. No dysuria nor burning. \par \par

## 2021-12-03 NOTE — REVIEW OF SYSTEMS
[Joint Pain] : joint pain [Negative] : Allergic/Immunologic [Dry Eyes] : dryness of the eyes [Dizziness] : dizziness [FreeTextEntry9] : back pain, RA

## 2021-12-06 LAB
ALBUMIN SERPL ELPH-MCNC: 4.3 G/DL
ALP BLD-CCNC: 100 U/L
ALT SERPL-CCNC: 18 U/L
ANION GAP SERPL CALC-SCNC: 13 MMOL/L
AST SERPL-CCNC: 27 U/L
BILIRUB SERPL-MCNC: 0.3 MG/DL
BUN SERPL-MCNC: 46 MG/DL
CALCIUM SERPL-MCNC: 10.2 MG/DL
CHLORIDE SERPL-SCNC: 104 MMOL/L
CO2 SERPL-SCNC: 21 MMOL/L
CREAT SERPL-MCNC: 1.52 MG/DL
DEPRECATED KAPPA LC FREE/LAMBDA SER: 1.36 RATIO
DEPRECATED KAPPA LC FREE/LAMBDA SER: 1.36 RATIO
GLUCOSE SERPL-MCNC: 74 MG/DL
IGA SER QL IEP: 474 MG/DL
IGG SER QL IEP: 1842 MG/DL
IGM SER QL IEP: 193 MG/DL
KAPPA LC CSF-MCNC: 5.56 MG/DL
KAPPA LC CSF-MCNC: 5.56 MG/DL
KAPPA LC SERPL-MCNC: 7.54 MG/DL
KAPPA LC SERPL-MCNC: 7.54 MG/DL
LDH SERPL-CCNC: 231 U/L
POTASSIUM SERPL-SCNC: 5.2 MMOL/L
PROT SERPL-MCNC: 7.8 G/DL
SODIUM SERPL-SCNC: 139 MMOL/L

## 2021-12-15 LAB
ALBUMIN MFR SERPL ELPH: 52.9 %
ALBUMIN SERPL-MCNC: 4.1 G/DL
ALBUMIN/GLOB SERPL: 1.1 RATIO
ALPHA1 GLOB MFR SERPL ELPH: 3.3 %
ALPHA1 GLOB SERPL ELPH-MCNC: 0.3 G/DL
ALPHA2 GLOB MFR SERPL ELPH: 7.6 %
ALPHA2 GLOB SERPL ELPH-MCNC: 0.6 G/DL
B-GLOBULIN MFR SERPL ELPH: 12.8 %
B-GLOBULIN SERPL ELPH-MCNC: 1 G/DL
GAMMA GLOB FLD ELPH-MCNC: 1.8 G/DL
GAMMA GLOB MFR SERPL ELPH: 23.4 %
INTERPRETATION SERPL IEP-IMP: NORMAL
M PROTEIN MFR SERPL ELPH: NORMAL
MONOCLON BAND OBS SERPL: NORMAL
PROT SERPL-MCNC: 7.8 G/DL
PROT SERPL-MCNC: 7.8 G/DL

## 2022-02-02 ENCOUNTER — APPOINTMENT (OUTPATIENT)
Dept: HEMATOLOGY ONCOLOGY | Facility: CLINIC | Age: 82
End: 2022-02-02

## 2022-02-08 ENCOUNTER — OUTPATIENT (OUTPATIENT)
Dept: OUTPATIENT SERVICES | Facility: HOSPITAL | Age: 82
LOS: 1 days | Discharge: HOME | End: 2022-02-08

## 2022-02-08 DIAGNOSIS — D47.2 MONOCLONAL GAMMOPATHY: ICD-10-CM

## 2022-02-08 DIAGNOSIS — S82.92XA UNSPECIFIED FRACTURE OF LEFT LOWER LEG, INITIAL ENCOUNTER FOR CLOSED FRACTURE: Chronic | ICD-10-CM

## 2022-02-09 ENCOUNTER — RESULT REVIEW (OUTPATIENT)
Age: 82
End: 2022-02-09

## 2022-02-09 ENCOUNTER — APPOINTMENT (OUTPATIENT)
Dept: HEMATOLOGY ONCOLOGY | Facility: CLINIC | Age: 82
End: 2022-02-09
Payer: MEDICARE

## 2022-02-09 VITALS
TEMPERATURE: 97.3 F | OXYGEN SATURATION: 98 % | BODY MASS INDEX: 20.36 KG/M2 | RESPIRATION RATE: 15 BRPM | HEART RATE: 76 BPM | HEIGHT: 61.42 IN | DIASTOLIC BLOOD PRESSURE: 92 MMHG | WEIGHT: 109.23 LBS | SYSTOLIC BLOOD PRESSURE: 162 MMHG

## 2022-02-09 LAB
BASOPHILS # BLD AUTO: 0.04 K/UL — SIGNIFICANT CHANGE UP (ref 0–0.2)
BASOPHILS NFR BLD AUTO: 0.6 % — SIGNIFICANT CHANGE UP (ref 0–2)
EOSINOPHIL # BLD AUTO: 0.05 K/UL — SIGNIFICANT CHANGE UP (ref 0–0.5)
EOSINOPHIL NFR BLD AUTO: 0.7 % — SIGNIFICANT CHANGE UP (ref 0–6)
HCT VFR BLD CALC: 36.1 % — SIGNIFICANT CHANGE UP (ref 34.5–45)
HGB BLD-MCNC: 11.4 G/DL — LOW (ref 11.5–15.5)
IMM GRANULOCYTES NFR BLD AUTO: 0.4 % — SIGNIFICANT CHANGE UP (ref 0–1.5)
LYMPHOCYTES # BLD AUTO: 1.75 K/UL — SIGNIFICANT CHANGE UP (ref 1–3.3)
LYMPHOCYTES # BLD AUTO: 26.2 % — SIGNIFICANT CHANGE UP (ref 13–44)
MCHC RBC-ENTMCNC: 28.4 PG — SIGNIFICANT CHANGE UP (ref 27–34)
MCHC RBC-ENTMCNC: 31.6 G/DL — LOW (ref 32–36)
MCV RBC AUTO: 90 FL — SIGNIFICANT CHANGE UP (ref 80–100)
MONOCYTES # BLD AUTO: 0.49 K/UL — SIGNIFICANT CHANGE UP (ref 0–0.9)
MONOCYTES NFR BLD AUTO: 7.3 % — SIGNIFICANT CHANGE UP (ref 2–14)
NEUTROPHILS # BLD AUTO: 4.33 K/UL — SIGNIFICANT CHANGE UP (ref 1.8–7.4)
NEUTROPHILS NFR BLD AUTO: 64.8 % — SIGNIFICANT CHANGE UP (ref 43–77)
NRBC # BLD: 0 /100 WBCS — SIGNIFICANT CHANGE UP (ref 0–0)
PLATELET # BLD AUTO: 648 K/UL — HIGH (ref 150–400)
RBC # BLD: 4.01 M/UL — SIGNIFICANT CHANGE UP (ref 3.8–5.2)
RBC # FLD: 15 % — HIGH (ref 10.3–14.5)
WBC # BLD: 6.69 K/UL — SIGNIFICANT CHANGE UP (ref 3.8–10.5)
WBC # FLD AUTO: 6.69 K/UL — SIGNIFICANT CHANGE UP (ref 3.8–10.5)

## 2022-02-09 PROCEDURE — 99214 OFFICE O/P EST MOD 30 MIN: CPT

## 2022-02-09 NOTE — REVIEW OF SYSTEMS
[Dry Eyes] : dryness of the eyes [Joint Pain] : joint pain [Dizziness] : dizziness [Negative] : Heme/Lymph [FreeTextEntry9] : back pain, RA [FreeTextEntry1] : Raynaud's

## 2022-02-09 NOTE — REASON FOR VISIT
[Follow-Up Visit] : a follow-up visit for [Coagulopathy] : coagulopathy [Monoclonal Gammopathy] : monoclonal gammopathy [Myeloproliferative Disorder] : myeloproliferative disorder [Thrombocytosis] : thrombocytosis [Family Member] : family member

## 2022-02-09 NOTE — CONSULT LETTER
[Dear  ___] : Dear  [unfilled], [Courtesy Letter:] : I had the pleasure of seeing your patient, [unfilled], in my office today. [Please see my note below.] : Please see my note below. [Sincerely,] : Sincerely, [DrKyle  ___] : Dr. PALOMO [DrKyle ___] : Dr. PALOMO [FreeTextEntry2] : Bernice Chaudhary MD [FreeTextEntry3] : Octaviano Ching M.D., FACP\par Professor of Medicine\par [facility] [employer]\par Associate Chief, Division of Hematology\par [facility]\par [facility]\par [address] [place] [address]\[place] [address]\par [phone]\par \par \par \par

## 2022-02-09 NOTE — ADDENDUM
[FreeTextEntry1] : I, Angy Hathaway, acted solely as a scribe for Dr. Octaviano Ching on 02/09/2022. All medical entries made by the Scribe were at my, Dr. Octaviano Ching's, direction and personally dictated by me on 02/09/2022. I have reviewed the chart and agree that the record accurately reflects my personal performance of the history, physical exam, assessment and plan. I have also personally directed, reviewed, and agreed with the chart.

## 2022-02-09 NOTE — PHYSICAL EXAM
[Fully active, able to carry on all pre-disease performance without restriction] : Status 0 - Fully active, able to carry on all pre-disease performance without restriction [Normal] : affect appropriate [de-identified] : RRR. S1S2 normal. Gr 2/6 KARISHMA apex to left axilla. No gallops.

## 2022-02-09 NOTE — ASSESSMENT
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 She has a prior history of provoked DVT and is heterozygous for Factor V Leiden. She suffered a PE following fracture of her hip and a long airplane flight. This event was provoked. I do not believe that lifelong Eliquis anticoagulation is indicated and I am concerned that the risks of bleeding in this elderly woman would outweigh the benefit. Completed 6 months of Eliquis and then switched to ASA prophylaxis as before. 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 \par \par Plan:\par ASA\par Jobst stockings\par CMP, LDH\par RTC 3 months. \par \par

## 2022-02-09 NOTE — RESULTS/DATA
[FreeTextEntry1] : WBC 6690 Hgb 11.4 Hct 36.1 Platelets 648,000 Diff normal\par \par 12/3/21\par CMP CO2 21, BUN 46, Creatinine 1.52, eGFR 32\par \par SPIEP: weak IgG kappa band identified. \par SPEP: weak gamma-migrating paraprotein identified. \par IgG 1842, A 474, M 193\par SFLC kappa 7.54, lambda 5.56, ratio 1.36

## 2022-02-09 NOTE — HISTORY OF PRESENT ILLNESS
[Disease:__________________________] : Disease: [unfilled] [de-identified] : Factor V Leiden heterozygote\par LLE DVT post-op\par 8/2019 PE after fracturing hip and a long flight; IVC filter placed and removed. 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 She has trouble sleeping. Ambulating more. Is still ambulating with a cane. Chronic back pain better. Vertigo also better. Still has dry eyes. Uses eye drops. No other complaints. She notes no fever, night sweats, swollen glands, headaches, new visual problems, bleeding, bruising, skeletal pain, chest pain, shortness of breath, abdominal pain, melena, BRBPR, leg pain/swelling, foot pain, pruritus. Weight stable. Has Raynaud's. Wears stockings. RA is controlled.\par \par

## 2022-02-10 LAB
ALBUMIN SERPL ELPH-MCNC: 4.4 G/DL
ALP BLD-CCNC: 94 U/L
ALT SERPL-CCNC: 19 U/L
ANION GAP SERPL CALC-SCNC: 12 MMOL/L
AST SERPL-CCNC: 28 U/L
BILIRUB SERPL-MCNC: 0.6 MG/DL
BUN SERPL-MCNC: 24 MG/DL
CALCIUM SERPL-MCNC: 10.5 MG/DL
CHLORIDE SERPL-SCNC: 100 MMOL/L
CO2 SERPL-SCNC: 25 MMOL/L
CREAT SERPL-MCNC: 1.24 MG/DL
DEPRECATED KAPPA LC FREE/LAMBDA SER: 1.39 RATIO
DEPRECATED KAPPA LC FREE/LAMBDA SER: 1.39 RATIO
GLUCOSE SERPL-MCNC: 83 MG/DL
IGA SER QL IEP: 478 MG/DL
IGG SER QL IEP: 1670 MG/DL
IGM SER QL IEP: 185 MG/DL
KAPPA LC CSF-MCNC: 5.86 MG/DL
KAPPA LC CSF-MCNC: 5.86 MG/DL
KAPPA LC SERPL-MCNC: 8.17 MG/DL
KAPPA LC SERPL-MCNC: 8.17 MG/DL
LDH SERPL-CCNC: 240 U/L
POTASSIUM SERPL-SCNC: 4.5 MMOL/L
PROT SERPL-MCNC: 7.7 G/DL
SODIUM SERPL-SCNC: 137 MMOL/L

## 2022-04-07 NOTE — ED PROVIDER NOTE - DATE/TIME 1
[FreeTextEntry1] : Patient needs screening CAT scan of the chest now.\par Assessment:\par COPD  \par \par plan:\par Stress compliance with inhalers. Renewed today.\par cont PRN albuterol\par needs PFT\par weight loss\par screening CT chest now\par D/C smoking was discussed with the patient\par F/U 6 months\par 
01-Aug-2019 23:21

## 2022-05-05 ENCOUNTER — OUTPATIENT (OUTPATIENT)
Dept: OUTPATIENT SERVICES | Facility: HOSPITAL | Age: 82
LOS: 1 days | Discharge: HOME | End: 2022-05-05

## 2022-05-05 DIAGNOSIS — S82.92XA UNSPECIFIED FRACTURE OF LEFT LOWER LEG, INITIAL ENCOUNTER FOR CLOSED FRACTURE: Chronic | ICD-10-CM

## 2022-05-05 DIAGNOSIS — D47.2 MONOCLONAL GAMMOPATHY: ICD-10-CM

## 2022-05-10 ENCOUNTER — APPOINTMENT (OUTPATIENT)
Dept: HEMATOLOGY ONCOLOGY | Facility: CLINIC | Age: 82
End: 2022-05-10
Payer: MEDICARE

## 2022-05-10 ENCOUNTER — RESULT REVIEW (OUTPATIENT)
Age: 82
End: 2022-05-10

## 2022-05-10 ENCOUNTER — LABORATORY RESULT (OUTPATIENT)
Age: 82
End: 2022-05-10

## 2022-05-10 VITALS
TEMPERATURE: 98.1 F | SYSTOLIC BLOOD PRESSURE: 129 MMHG | BODY MASS INDEX: 20.13 KG/M2 | OXYGEN SATURATION: 97 % | WEIGHT: 108 LBS | HEART RATE: 73 BPM | RESPIRATION RATE: 16 BRPM | DIASTOLIC BLOOD PRESSURE: 74 MMHG

## 2022-05-10 LAB
ALBUMIN SERPL ELPH-MCNC: 4.1 G/DL
ALP BLD-CCNC: 87 U/L
ALT SERPL-CCNC: 22 U/L
ANION GAP SERPL CALC-SCNC: 11 MMOL/L
AST SERPL-CCNC: 29 U/L
BASOPHILS # BLD AUTO: 0.05 K/UL — SIGNIFICANT CHANGE UP (ref 0–0.2)
BASOPHILS NFR BLD AUTO: 0.6 % — SIGNIFICANT CHANGE UP (ref 0–2)
BILIRUB SERPL-MCNC: 0.6 MG/DL
BUN SERPL-MCNC: 26 MG/DL
CALCIUM SERPL-MCNC: 10.2 MG/DL
CHLORIDE SERPL-SCNC: 97 MMOL/L
CO2 SERPL-SCNC: 26 MMOL/L
CREAT SERPL-MCNC: 1.09 MG/DL
DEPRECATED KAPPA LC FREE/LAMBDA SER: 1.4 RATIO
DEPRECATED KAPPA LC FREE/LAMBDA SER: 1.4 RATIO
EGFR: 51 ML/MIN/1.73M2
EOSINOPHIL # BLD AUTO: 0.06 K/UL — SIGNIFICANT CHANGE UP (ref 0–0.5)
EOSINOPHIL NFR BLD AUTO: 0.8 % — SIGNIFICANT CHANGE UP (ref 0–6)
GLUCOSE SERPL-MCNC: 92 MG/DL
HCT VFR BLD CALC: 35.5 % — SIGNIFICANT CHANGE UP (ref 34.5–45)
HGB BLD-MCNC: 11.4 G/DL — LOW (ref 11.5–15.5)
IGA SER QL IEP: 466 MG/DL
IGG SER QL IEP: 1648 MG/DL
IGM SER QL IEP: 178 MG/DL
IMM GRANULOCYTES NFR BLD AUTO: 0.5 % — SIGNIFICANT CHANGE UP (ref 0–1.5)
KAPPA LC CSF-MCNC: 4.61 MG/DL
KAPPA LC CSF-MCNC: 4.61 MG/DL
KAPPA LC SERPL-MCNC: 6.46 MG/DL
KAPPA LC SERPL-MCNC: 6.46 MG/DL
LDH SERPL-CCNC: 288 U/L
LYMPHOCYTES # BLD AUTO: 1.45 K/UL — SIGNIFICANT CHANGE UP (ref 1–3.3)
LYMPHOCYTES # BLD AUTO: 18.2 % — SIGNIFICANT CHANGE UP (ref 13–44)
MCHC RBC-ENTMCNC: 28.4 PG — SIGNIFICANT CHANGE UP (ref 27–34)
MCHC RBC-ENTMCNC: 32.1 G/DL — SIGNIFICANT CHANGE UP (ref 32–36)
MCV RBC AUTO: 88.3 FL — SIGNIFICANT CHANGE UP (ref 80–100)
MONOCYTES # BLD AUTO: 0.38 K/UL — SIGNIFICANT CHANGE UP (ref 0–0.9)
MONOCYTES NFR BLD AUTO: 4.8 % — SIGNIFICANT CHANGE UP (ref 2–14)
NEUTROPHILS # BLD AUTO: 5.99 K/UL — SIGNIFICANT CHANGE UP (ref 1.8–7.4)
NEUTROPHILS NFR BLD AUTO: 75.1 % — SIGNIFICANT CHANGE UP (ref 43–77)
NRBC # BLD: 0 /100 WBCS — SIGNIFICANT CHANGE UP (ref 0–0)
PLATELET # BLD AUTO: 865 K/UL — HIGH (ref 150–400)
POTASSIUM SERPL-SCNC: 4.7 MMOL/L
PROT SERPL-MCNC: 7.4 G/DL
RBC # BLD: 4.02 M/UL — SIGNIFICANT CHANGE UP (ref 3.8–5.2)
RBC # FLD: 14.8 % — HIGH (ref 10.3–14.5)
SODIUM SERPL-SCNC: 133 MMOL/L
WBC # BLD: 7.97 K/UL — SIGNIFICANT CHANGE UP (ref 3.8–10.5)
WBC # FLD AUTO: 7.97 K/UL — SIGNIFICANT CHANGE UP (ref 3.8–10.5)

## 2022-05-10 PROCEDURE — 99214 OFFICE O/P EST MOD 30 MIN: CPT

## 2022-05-10 NOTE — RESULTS/DATA
[FreeTextEntry1] : WBC 7970 Hgb 11.4 Hct 35.5 Platelets 865,000 Diff normal\par \par 2/9/22\par CMP BUN 24, eGFR 41\par \par IgG 1670, A 478, M 185\par SFLC kappa 8.17, lambda 5.86, ratio 1.39

## 2022-05-10 NOTE — CONSULT LETTER
[Dear  ___] : Dear  [unfilled], [Courtesy Letter:] : I had the pleasure of seeing your patient, [unfilled], in my office today. [Please see my note below.] : Please see my note below. [Sincerely,] : Sincerely, [DrKyle  ___] : Dr. PALOMO [DrKyle ___] : Dr. PALOMO [FreeTextEntry2] : Bernice Chaudhary MD [FreeTextEntry3] : Octaviano Ching M.D., FACP\par Professor of Medicine\par [facility] [employer]\par Associate Chief, Division of Hematology\par [facility]\par [facility]\par [address] [place] [address]\[place] [address]\par [phone]\par \par \par \par
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
## 2022-05-10 NOTE — REVIEW OF SYSTEMS
[Dry Eyes] : dryness of the eyes [Joint Pain] : joint pain [Dizziness] : dizziness [Negative] : Heme/Lymph [Fatigue] : fatigue [Constipation] : constipation [FreeTextEntry9] : Shoulder pain, RA [de-identified] : Senile purpura  [de-identified] : HA [FreeTextEntry1] : Raynaud's

## 2022-05-10 NOTE — ADDENDUM
[FreeTextEntry1] : I, Angy Rivas, acted solely as a scribe for Dr. Octaviano Ching on 05/10/2022. All medical entries made by the Scribe were at my, Dr. Octaviano Ching's, direction and personally dictated by me on 05/09/2022. I have reviewed the chart and agree that the record accurately reflects my personal performance of the history, physical exam, assessment and plan. I have also personally directed, reviewed, and agreed with the chart.

## 2022-05-10 NOTE — PHYSICAL EXAM
[Fully active, able to carry on all pre-disease performance without restriction] : Status 0 - Fully active, able to carry on all pre-disease performance without restriction [Normal] : full range of motion and no deformities appreciated [de-identified] : RRR. S1S2 normal. Gr 2/6 KARISHMA apex to left axilla. No gallops. [de-identified] : Senile purpura right dorsal forearm
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
## 2022-05-18 LAB
ALBUMIN MFR SERPL ELPH: 51.3 %
ALBUMIN SERPL-MCNC: 3.8 G/DL
ALBUMIN/GLOB SERPL: 1.1 RATIO
ALPHA1 GLOB MFR SERPL ELPH: 4.1 %
ALPHA1 GLOB SERPL ELPH-MCNC: 0.3 G/DL
ALPHA2 GLOB MFR SERPL ELPH: 8.5 %
ALPHA2 GLOB SERPL ELPH-MCNC: 0.6 G/DL
B-GLOBULIN MFR SERPL ELPH: 13.3 %
B-GLOBULIN SERPL ELPH-MCNC: 1 G/DL
GAMMA GLOB FLD ELPH-MCNC: 1.7 G/DL
GAMMA GLOB MFR SERPL ELPH: 22.8 %
INTERPRETATION SERPL IEP-IMP: NORMAL
PROT SERPL-MCNC: 7.4 G/DL
PROT SERPL-MCNC: 7.4 G/DL

## 2022-06-02 ENCOUNTER — OUTPATIENT (OUTPATIENT)
Dept: OUTPATIENT SERVICES | Facility: HOSPITAL | Age: 82
LOS: 1 days | Discharge: HOME | End: 2022-06-02

## 2022-06-02 DIAGNOSIS — S82.92XA UNSPECIFIED FRACTURE OF LEFT LOWER LEG, INITIAL ENCOUNTER FOR CLOSED FRACTURE: Chronic | ICD-10-CM

## 2022-06-02 DIAGNOSIS — D47.2 MONOCLONAL GAMMOPATHY: ICD-10-CM

## 2022-06-09 ENCOUNTER — RESULT REVIEW (OUTPATIENT)
Age: 82
End: 2022-06-09

## 2022-06-09 ENCOUNTER — APPOINTMENT (OUTPATIENT)
Dept: HEMATOLOGY ONCOLOGY | Facility: CLINIC | Age: 82
End: 2022-06-09
Payer: MEDICARE

## 2022-06-09 ENCOUNTER — NON-APPOINTMENT (OUTPATIENT)
Age: 82
End: 2022-06-09

## 2022-06-09 VITALS
OXYGEN SATURATION: 98 % | TEMPERATURE: 97.2 F | HEART RATE: 72 BPM | SYSTOLIC BLOOD PRESSURE: 135 MMHG | WEIGHT: 106.46 LBS | HEIGHT: 61.61 IN | BODY MASS INDEX: 19.84 KG/M2 | DIASTOLIC BLOOD PRESSURE: 80 MMHG | RESPIRATION RATE: 16 BRPM

## 2022-06-09 LAB
BASOPHILS # BLD AUTO: 0.05 K/UL — SIGNIFICANT CHANGE UP (ref 0–0.2)
BASOPHILS NFR BLD AUTO: 0.5 % — SIGNIFICANT CHANGE UP (ref 0–2)
EOSINOPHIL # BLD AUTO: 0.11 K/UL — SIGNIFICANT CHANGE UP (ref 0–0.5)
EOSINOPHIL NFR BLD AUTO: 1.1 % — SIGNIFICANT CHANGE UP (ref 0–6)
HCT VFR BLD CALC: 35.8 % — SIGNIFICANT CHANGE UP (ref 34.5–45)
HGB BLD-MCNC: 12 G/DL — SIGNIFICANT CHANGE UP (ref 11.5–15.5)
IMM GRANULOCYTES NFR BLD AUTO: 0.8 % — SIGNIFICANT CHANGE UP (ref 0–1.5)
LYMPHOCYTES # BLD AUTO: 1.91 K/UL — SIGNIFICANT CHANGE UP (ref 1–3.3)
LYMPHOCYTES # BLD AUTO: 19.8 % — SIGNIFICANT CHANGE UP (ref 13–44)
MCHC RBC-ENTMCNC: 29.2 PG — SIGNIFICANT CHANGE UP (ref 27–34)
MCHC RBC-ENTMCNC: 33.5 G/DL — SIGNIFICANT CHANGE UP (ref 32–36)
MCV RBC AUTO: 87.1 FL — SIGNIFICANT CHANGE UP (ref 80–100)
MONOCYTES # BLD AUTO: 0.52 K/UL — SIGNIFICANT CHANGE UP (ref 0–0.9)
MONOCYTES NFR BLD AUTO: 5.4 % — SIGNIFICANT CHANGE UP (ref 2–14)
NEUTROPHILS # BLD AUTO: 6.96 K/UL — SIGNIFICANT CHANGE UP (ref 1.8–7.4)
NEUTROPHILS NFR BLD AUTO: 72.4 % — SIGNIFICANT CHANGE UP (ref 43–77)
NRBC # BLD: 0 /100 WBCS — SIGNIFICANT CHANGE UP (ref 0–0)
PLATELET # BLD AUTO: 732 K/UL — HIGH (ref 150–400)
RBC # BLD: 4.11 M/UL — SIGNIFICANT CHANGE UP (ref 3.8–5.2)
RBC # FLD: 15.3 % — HIGH (ref 10.3–14.5)
WBC # BLD: 9.63 K/UL — SIGNIFICANT CHANGE UP (ref 3.8–10.5)
WBC # FLD AUTO: 9.63 K/UL — SIGNIFICANT CHANGE UP (ref 3.8–10.5)

## 2022-06-09 PROCEDURE — 99213 OFFICE O/P EST LOW 20 MIN: CPT

## 2022-06-09 NOTE — CONSULT LETTER
[Dear  ___] : Dear  [unfilled], [Courtesy Letter:] : I had the pleasure of seeing your patient, [unfilled], in my office today. [Please see my note below.] : Please see my note below. [Sincerely,] : Sincerely, [DrKyle  ___] : Dr. PALOMO [DrKyle ___] : Dr. PALOMO [FreeTextEntry2] : Bernice Chaudhary MD [FreeTextEntry3] : Octaviano Ching M.D., FACP\par Professor of Medicine\par [facility] [employer]\par Associate Chief, Division of Hematology\par [facility]\par [facility]\par [address] [place] [address]\[place] [address]\par [phone]\par \par \par \par

## 2022-06-09 NOTE — REVIEW OF SYSTEMS
[Constipation] : constipation [Joint Pain] : joint pain [Dizziness] : dizziness [Fatigue] : no fatigue [Dry Eyes] : no dryness of the eyes [Negative] : Eyes [FreeTextEntry9] : Shoulder pain, RA; neck pain [de-identified] : Senile purpura  [de-identified] : HA [FreeTextEntry1] : Raynaud's
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
## 2022-06-09 NOTE — PHYSICAL EXAM
[Fully active, able to carry on all pre-disease performance without restriction] : Status 0 - Fully active, able to carry on all pre-disease performance without restriction [Normal] : affect appropriate [de-identified] : RRR. S1S2 normal. Gr 2/6 KARISHMA apex to left axilla. No gallops. [de-identified] : Senile purpura right dorsal forearm
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
## 2022-06-14 ENCOUNTER — EMERGENCY (EMERGENCY)
Facility: HOSPITAL | Age: 82
LOS: 1 days | Discharge: HOME | End: 2022-06-14
Attending: STUDENT IN AN ORGANIZED HEALTH CARE EDUCATION/TRAINING PROGRAM
Payer: MEDICARE

## 2022-06-14 VITALS
HEART RATE: 89 BPM | OXYGEN SATURATION: 97 % | RESPIRATION RATE: 16 BRPM | HEIGHT: 62 IN | DIASTOLIC BLOOD PRESSURE: 92 MMHG | SYSTOLIC BLOOD PRESSURE: 144 MMHG | TEMPERATURE: 98 F | WEIGHT: 108.03 LBS

## 2022-06-14 VITALS
DIASTOLIC BLOOD PRESSURE: 76 MMHG | SYSTOLIC BLOOD PRESSURE: 120 MMHG | HEART RATE: 73 BPM | RESPIRATION RATE: 18 BRPM | OXYGEN SATURATION: 96 %

## 2022-06-14 DIAGNOSIS — S82.92XA UNSPECIFIED FRACTURE OF LEFT LOWER LEG, INITIAL ENCOUNTER FOR CLOSED FRACTURE: Chronic | ICD-10-CM

## 2022-06-14 LAB
APPEARANCE UR: CLEAR — SIGNIFICANT CHANGE UP
BACTERIA # UR AUTO: NEGATIVE — SIGNIFICANT CHANGE UP
BILIRUB UR-MCNC: NEGATIVE — SIGNIFICANT CHANGE UP
COLOR SPEC: SIGNIFICANT CHANGE UP
DIFF PNL FLD: NEGATIVE — SIGNIFICANT CHANGE UP
EPI CELLS # UR: 1 /HPF — SIGNIFICANT CHANGE UP
GLUCOSE UR QL: NEGATIVE — SIGNIFICANT CHANGE UP
HYALINE CASTS # UR AUTO: 0 /LPF — SIGNIFICANT CHANGE UP (ref 0–2)
KETONES UR-MCNC: NEGATIVE — SIGNIFICANT CHANGE UP
LEUKOCYTE ESTERASE UR-ACNC: NEGATIVE — SIGNIFICANT CHANGE UP
NITRITE UR-MCNC: NEGATIVE — SIGNIFICANT CHANGE UP
PH UR: 6.5 — SIGNIFICANT CHANGE UP (ref 5–8)
PROT UR-MCNC: ABNORMAL
RBC CASTS # UR COMP ASSIST: 0 /HPF — SIGNIFICANT CHANGE UP (ref 0–4)
SP GR SPEC: 1.01 — LOW (ref 1.01–1.02)
UROBILINOGEN FLD QL: NEGATIVE — SIGNIFICANT CHANGE UP
WBC UR QL: 0 /HPF — SIGNIFICANT CHANGE UP (ref 0–5)

## 2022-06-14 PROCEDURE — 76377 3D RENDER W/INTRP POSTPROCES: CPT | Mod: 26

## 2022-06-14 PROCEDURE — 72125 CT NECK SPINE W/O DYE: CPT | Mod: MA

## 2022-06-14 PROCEDURE — 87086 URINE CULTURE/COLONY COUNT: CPT

## 2022-06-14 PROCEDURE — 70486 CT MAXILLOFACIAL W/O DYE: CPT | Mod: 26,MA

## 2022-06-14 PROCEDURE — 99284 EMERGENCY DEPT VISIT MOD MDM: CPT | Mod: 25

## 2022-06-14 PROCEDURE — 81001 URINALYSIS AUTO W/SCOPE: CPT

## 2022-06-14 PROCEDURE — 72170 X-RAY EXAM OF PELVIS: CPT

## 2022-06-14 PROCEDURE — 72125 CT NECK SPINE W/O DYE: CPT | Mod: 26,MA

## 2022-06-14 PROCEDURE — 70450 CT HEAD/BRAIN W/O DYE: CPT | Mod: 26,MA

## 2022-06-14 PROCEDURE — 70450 CT HEAD/BRAIN W/O DYE: CPT | Mod: MA

## 2022-06-14 PROCEDURE — 71046 X-RAY EXAM CHEST 2 VIEWS: CPT

## 2022-06-14 PROCEDURE — 72170 X-RAY EXAM OF PELVIS: CPT | Mod: 26

## 2022-06-14 PROCEDURE — 71046 X-RAY EXAM CHEST 2 VIEWS: CPT | Mod: 26

## 2022-06-14 PROCEDURE — 70486 CT MAXILLOFACIAL W/O DYE: CPT | Mod: MA

## 2022-06-14 PROCEDURE — 76377 3D RENDER W/INTRP POSTPROCES: CPT

## 2022-06-14 PROCEDURE — 99284 EMERGENCY DEPT VISIT MOD MDM: CPT

## 2022-06-14 NOTE — ED ADULT NURSE NOTE - NSICDXPASTMEDICALHX_GEN_ALL_CORE_FT
PAST MEDICAL HISTORY:  DVT (deep venous thrombosis), left 2009 txted with coumadin x 6 months    HTN (hypertension)     Osteoporosis     UTI (urinary tract infection) 1 week ago txted by pmd    Wrist fracture

## 2022-06-14 NOTE — ED PROVIDER NOTE - CLINICAL SUMMARY MEDICAL DECISION MAKING FREE TEXT BOX
Attending Susano: 80 y/o F w/ PMH as above presenting w/ fall. Pt well appearing, Description of fall sounds mechanical. Will obtain imaging to eval for traumatic pathology. Reporting increased frequency of urination, will check for UTI. Pt denying need for pain meds at this time. Plan for labs and imaging. Will reassess the need for additional interventions as clinically warranted.

## 2022-06-14 NOTE — ED PROVIDER NOTE - OBJECTIVE STATEMENT
82 y/o F w/ PMH of arthritis, GERD, HTN, prior DVT no longer on AC presenting w/ fall. Seen in blue, w/ granddaughter. Reports getting up in the middle of the night to use the bathroom. Had to use downstairs bathroom and while walking down stairs, missed the last step and fell forward striking face on couch. Denies pre-fall symptoms such as dizzy/chest pain/SOB, just endorsed feeling groggy because it was the middle of the night. Denies LOC. Is on ASA only. Has been able to walk since then. Denying any pain at this time. Endorsing swelling around L eye. Denies change in vision. Is concerned for UTI because she has been using the bathroom more frequently. No dysuira or hematuria. Denies back pain. Denies fevers, chills, headache, dizziness, blurred vision, chest pain, cough, shortness of breath, abdominal pain, n/v/d/c, MSK pain, rash.
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
## 2022-06-14 NOTE — ED PROVIDER NOTE - PHYSICAL EXAMINATION
Gen: NAD, AOx3, able to make needs known, non toxic  Head: normocephalic, ecchymosis around L eye w/ edema  HEENT: EOMI, no pain on EOM testing, oral mucosa moist, normal conjunctiva  Lung: CTAB, no respiratory distress, no wheezes/rhonchi/rales B/L, speaking in full sentences  CV: RRR, no murmurs  Abd: non distended, soft, nontender, no guarding  MSK: no visible deformities. no midline spinal tenderness, pelvis stable.  Neuro: Appears non focal. CN 2-12 grossly intact b/l. Str 5/5 x4  Skin: Warm, well perfused  Psych: normal affect

## 2022-06-14 NOTE — ED PROVIDER NOTE - PATIENT PORTAL LINK FT
You can access the FollowMyHealth Patient Portal offered by [facility] by registering at the following website: [email or web address]. By joining [facility]’s FollowMyHealth portal, you will also be able to view your health information using other applications (apps) compatible with our system.
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
## 2022-06-15 LAB
CULTURE RESULTS: SIGNIFICANT CHANGE UP
SPECIMEN SOURCE: SIGNIFICANT CHANGE UP

## 2022-07-19 ENCOUNTER — NON-APPOINTMENT (OUTPATIENT)
Age: 82
End: 2022-07-19

## 2022-08-03 ENCOUNTER — OUTPATIENT (OUTPATIENT)
Dept: OUTPATIENT SERVICES | Facility: HOSPITAL | Age: 82
LOS: 1 days | Discharge: HOME | End: 2022-08-03

## 2022-08-03 DIAGNOSIS — S82.92XA UNSPECIFIED FRACTURE OF LEFT LOWER LEG, INITIAL ENCOUNTER FOR CLOSED FRACTURE: Chronic | ICD-10-CM

## 2022-08-03 DIAGNOSIS — D47.2 MONOCLONAL GAMMOPATHY: ICD-10-CM

## 2022-08-11 ENCOUNTER — RESULT REVIEW (OUTPATIENT)
Age: 82
End: 2022-08-11

## 2022-08-11 ENCOUNTER — APPOINTMENT (OUTPATIENT)
Dept: HEMATOLOGY ONCOLOGY | Facility: CLINIC | Age: 82
End: 2022-08-11

## 2022-08-11 VITALS
OXYGEN SATURATION: 98 % | BODY MASS INDEX: 19.6 KG/M2 | SYSTOLIC BLOOD PRESSURE: 144 MMHG | DIASTOLIC BLOOD PRESSURE: 82 MMHG | TEMPERATURE: 97.7 F | RESPIRATION RATE: 16 BRPM | HEIGHT: 61.61 IN | WEIGHT: 105.16 LBS | HEART RATE: 84 BPM

## 2022-08-11 LAB
BASOPHILS # BLD AUTO: 0.09 K/UL — SIGNIFICANT CHANGE UP (ref 0–0.2)
BASOPHILS NFR BLD AUTO: 0.9 % — SIGNIFICANT CHANGE UP (ref 0–2)
EOSINOPHIL # BLD AUTO: 0.1 K/UL — SIGNIFICANT CHANGE UP (ref 0–0.5)
EOSINOPHIL NFR BLD AUTO: 1 % — SIGNIFICANT CHANGE UP (ref 0–6)
HCT VFR BLD CALC: 36.5 % — SIGNIFICANT CHANGE UP (ref 34.5–45)
HGB BLD-MCNC: 12.2 G/DL — SIGNIFICANT CHANGE UP (ref 11.5–15.5)
IMM GRANULOCYTES NFR BLD AUTO: 1.2 % — SIGNIFICANT CHANGE UP (ref 0–1.5)
LYMPHOCYTES # BLD AUTO: 2.26 K/UL — SIGNIFICANT CHANGE UP (ref 1–3.3)
LYMPHOCYTES # BLD AUTO: 22.6 % — SIGNIFICANT CHANGE UP (ref 13–44)
MCHC RBC-ENTMCNC: 28.7 PG — SIGNIFICANT CHANGE UP (ref 27–34)
MCHC RBC-ENTMCNC: 33.4 G/DL — SIGNIFICANT CHANGE UP (ref 32–36)
MCV RBC AUTO: 85.9 FL — SIGNIFICANT CHANGE UP (ref 80–100)
MONOCYTES # BLD AUTO: 0.6 K/UL — SIGNIFICANT CHANGE UP (ref 0–0.9)
MONOCYTES NFR BLD AUTO: 6 % — SIGNIFICANT CHANGE UP (ref 2–14)
NEUTROPHILS # BLD AUTO: 6.83 K/UL — SIGNIFICANT CHANGE UP (ref 1.8–7.4)
NEUTROPHILS NFR BLD AUTO: 68.3 % — SIGNIFICANT CHANGE UP (ref 43–77)
NRBC # BLD: 0 /100 WBCS — SIGNIFICANT CHANGE UP (ref 0–0)
PLATELET # BLD AUTO: 751 K/UL — HIGH (ref 150–400)
RBC # BLD: 4.25 M/UL — SIGNIFICANT CHANGE UP (ref 3.8–5.2)
RBC # FLD: 14.8 % — HIGH (ref 10.3–14.5)
WBC # BLD: 10 K/UL — SIGNIFICANT CHANGE UP (ref 3.8–10.5)
WBC # FLD AUTO: 10 K/UL — SIGNIFICANT CHANGE UP (ref 3.8–10.5)

## 2022-08-11 PROCEDURE — 99213 OFFICE O/P EST LOW 20 MIN: CPT

## 2022-08-15 ENCOUNTER — EMERGENCY (EMERGENCY)
Facility: HOSPITAL | Age: 82
LOS: 1 days | Discharge: HOME | End: 2022-08-15
Attending: STUDENT IN AN ORGANIZED HEALTH CARE EDUCATION/TRAINING PROGRAM | Admitting: STUDENT IN AN ORGANIZED HEALTH CARE EDUCATION/TRAINING PROGRAM

## 2022-08-15 VITALS
DIASTOLIC BLOOD PRESSURE: 90 MMHG | SYSTOLIC BLOOD PRESSURE: 138 MMHG | OXYGEN SATURATION: 100 % | RESPIRATION RATE: 16 BRPM | HEART RATE: 90 BPM

## 2022-08-15 VITALS
SYSTOLIC BLOOD PRESSURE: 147 MMHG | OXYGEN SATURATION: 100 % | RESPIRATION RATE: 14 BRPM | HEIGHT: 62 IN | HEART RATE: 94 BPM | DIASTOLIC BLOOD PRESSURE: 79 MMHG | TEMPERATURE: 98 F

## 2022-08-15 DIAGNOSIS — S82.92XA UNSPECIFIED FRACTURE OF LEFT LOWER LEG, INITIAL ENCOUNTER FOR CLOSED FRACTURE: Chronic | ICD-10-CM

## 2022-08-15 LAB
ALBUMIN SERPL ELPH-MCNC: 4.7 G/DL — SIGNIFICANT CHANGE UP (ref 3.3–5)
ALP SERPL-CCNC: 97 U/L — SIGNIFICANT CHANGE UP (ref 40–120)
ALT FLD-CCNC: 22 U/L — SIGNIFICANT CHANGE UP (ref 4–33)
ANION GAP SERPL CALC-SCNC: 13 MMOL/L — SIGNIFICANT CHANGE UP (ref 7–14)
AST SERPL-CCNC: 28 U/L — SIGNIFICANT CHANGE UP (ref 4–32)
BASOPHILS # BLD AUTO: 0.05 K/UL — SIGNIFICANT CHANGE UP (ref 0–0.2)
BASOPHILS NFR BLD AUTO: 0.4 % — SIGNIFICANT CHANGE UP (ref 0–2)
BILIRUB SERPL-MCNC: 0.5 MG/DL — SIGNIFICANT CHANGE UP (ref 0.2–1.2)
BUN SERPL-MCNC: 21 MG/DL — SIGNIFICANT CHANGE UP (ref 7–23)
CALCIUM SERPL-MCNC: 10.4 MG/DL — SIGNIFICANT CHANGE UP (ref 8.4–10.5)
CHLORIDE SERPL-SCNC: 102 MMOL/L — SIGNIFICANT CHANGE UP (ref 98–107)
CO2 SERPL-SCNC: 24 MMOL/L — SIGNIFICANT CHANGE UP (ref 22–31)
CREAT SERPL-MCNC: 1.05 MG/DL — SIGNIFICANT CHANGE UP (ref 0.5–1.3)
EGFR: 53 ML/MIN/1.73M2 — LOW
EOSINOPHIL # BLD AUTO: 0.08 K/UL — SIGNIFICANT CHANGE UP (ref 0–0.5)
EOSINOPHIL NFR BLD AUTO: 0.7 % — SIGNIFICANT CHANGE UP (ref 0–6)
GLUCOSE SERPL-MCNC: 80 MG/DL — SIGNIFICANT CHANGE UP (ref 70–99)
HCT VFR BLD CALC: 39.5 % — SIGNIFICANT CHANGE UP (ref 34.5–45)
HGB BLD-MCNC: 12.5 G/DL — SIGNIFICANT CHANGE UP (ref 11.5–15.5)
IANC: 9.1 K/UL — HIGH (ref 1.8–7.4)
IMM GRANULOCYTES NFR BLD AUTO: 0.7 % — SIGNIFICANT CHANGE UP (ref 0–1.5)
LYMPHOCYTES # BLD AUTO: 1.32 K/UL — SIGNIFICANT CHANGE UP (ref 1–3.3)
LYMPHOCYTES # BLD AUTO: 11.8 % — LOW (ref 13–44)
MCHC RBC-ENTMCNC: 28.3 PG — SIGNIFICANT CHANGE UP (ref 27–34)
MCHC RBC-ENTMCNC: 31.6 GM/DL — LOW (ref 32–36)
MCV RBC AUTO: 89.4 FL — SIGNIFICANT CHANGE UP (ref 80–100)
MONOCYTES # BLD AUTO: 0.56 K/UL — SIGNIFICANT CHANGE UP (ref 0–0.9)
MONOCYTES NFR BLD AUTO: 5 % — SIGNIFICANT CHANGE UP (ref 2–14)
NEUTROPHILS # BLD AUTO: 9.1 K/UL — HIGH (ref 1.8–7.4)
NEUTROPHILS NFR BLD AUTO: 81.4 % — HIGH (ref 43–77)
NRBC # BLD: 0 /100 WBCS — SIGNIFICANT CHANGE UP
NRBC # FLD: 0 K/UL — SIGNIFICANT CHANGE UP
PLATELET # BLD AUTO: 858 K/UL — HIGH (ref 150–400)
POTASSIUM SERPL-MCNC: 4 MMOL/L — SIGNIFICANT CHANGE UP (ref 3.5–5.3)
POTASSIUM SERPL-SCNC: 4 MMOL/L — SIGNIFICANT CHANGE UP (ref 3.5–5.3)
PROT SERPL-MCNC: 8.6 G/DL — HIGH (ref 6–8.3)
RBC # BLD: 4.42 M/UL — SIGNIFICANT CHANGE UP (ref 3.8–5.2)
RBC # FLD: 14.9 % — HIGH (ref 10.3–14.5)
SODIUM SERPL-SCNC: 139 MMOL/L — SIGNIFICANT CHANGE UP (ref 135–145)
TROPONIN T, HIGH SENSITIVITY RESULT: 16 NG/L — SIGNIFICANT CHANGE UP
TROPONIN T, HIGH SENSITIVITY RESULT: 16 NG/L — SIGNIFICANT CHANGE UP
WBC # BLD: 11.19 K/UL — HIGH (ref 3.8–10.5)
WBC # FLD AUTO: 11.19 K/UL — HIGH (ref 3.8–10.5)

## 2022-08-15 PROCEDURE — 99285 EMERGENCY DEPT VISIT HI MDM: CPT

## 2022-08-15 PROCEDURE — 73610 X-RAY EXAM OF ANKLE: CPT | Mod: 26,RT

## 2022-08-15 PROCEDURE — 71250 CT THORAX DX C-: CPT | Mod: 26,MA

## 2022-08-15 RX ORDER — ACETAMINOPHEN 500 MG
650 TABLET ORAL ONCE
Refills: 0 | Status: COMPLETED | OUTPATIENT
Start: 2022-08-15 | End: 2022-08-15

## 2022-08-15 RX ADMIN — Medication 650 MILLIGRAM(S): at 11:20
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
## 2022-08-15 NOTE — ED ADULT TRIAGE NOTE - PAIN RATING/NUMBER SCALE (0-10): REST
Advise getting a pair of shoes with right one having thick soles, 1/2 inch lift    Check temperature    Use daily claritin or allegra    Could use benadryl at night    Continue regular walking           
4
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
## 2022-08-15 NOTE — ED PROVIDER NOTE - PHYSICAL EXAMINATION
Gen: Well appearing in NAD  Head: NC/AT  Neck: trachea midline  chest : rrr, + left chest wall tenderness   abd nontender   no neck or back tenderness   Resp:  No distress  Ext: no deformities, no swelling or tenderness,   Neuro:  A&O appears non focal  Skin:  Warm and dry as visualized  Psych:  Normal affect and mood

## 2022-08-15 NOTE — ED PROVIDER NOTE - PATIENT PORTAL LINK FT
You can access the FollowMyHealth Patient Portal offered by [facility] by registering at the following website: [email or web address]. By joining [facility]’s FollowMyHealth portal, you will also be able to view your health information using other applications (apps) compatible with our system.
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
## 2022-08-16 NOTE — PHYSICAL EXAM
[Fully active, able to carry on all pre-disease performance without restriction] : Status 0 - Fully active, able to carry on all pre-disease performance without restriction [Normal] : affect appropriate [de-identified] : RRR. S1S2 normal. Gr 2/6 KARISHMA apex to left axilla. No gallops. [de-identified] : Senile purpura right dorsal forearm

## 2022-08-16 NOTE — CONSULT LETTER
[Dear  ___] : Dear  [unfilled], [Courtesy Letter:] : I had the pleasure of seeing your patient, [unfilled], in my office today. [Please see my note below.] : Please see my note below. [Sincerely,] : Sincerely, [DrKyle  ___] : Dr. PALOMO [DrKyle ___] : Dr. PALOMO [FreeTextEntry2] : Bernice Chaudhary MD [FreeTextEntry3] : Octaviano Ching M.D., FACP\par Professor of Medicine\par [facility] [employer]\par Associate Chief, Division of Hematology\par [facility]\par [facility]\par [address] [place] [address]\[place] [address]\par [phone]\par \par \par \par

## 2022-08-16 NOTE — REVIEW OF SYSTEMS
[Constipation] : constipation [Joint Pain] : joint pain [Dizziness] : dizziness [Negative] : Heme/Lymph [Fatigue] : no fatigue [Dry Eyes] : no dryness of the eyes [FreeTextEntry9] : Shoulder pain, RA; neck pain [de-identified] : Senile purpura  [de-identified] : HA [FreeTextEntry1] : Raynaud's

## 2022-08-16 NOTE — ASSESSMENT
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 She has a prior history of provoked DVT and is heterozygous for Factor V Leiden. She suffered a PE following fracture of her hip and a long airplane flight. This event was provoked. I do not believe that lifelong Eliquis anticoagulation is indicated and I am concerned that the risks of bleeding in this elderly woman would outweigh the benefit. Completed 6 months of Eliquis and then switched to ASA prophylaxis as before. 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 Platelets are 751 today.  Will continue to monitor. \par \par Plan:\par ASA\par Jobst stockings\par CMP, LDH, SPEP, Quant Ig, SFLC\par COVID second booster 4 months after first booster\par RTC 1 month\par

## 2022-09-06 ENCOUNTER — APPOINTMENT (OUTPATIENT)
Dept: HEMATOLOGY ONCOLOGY | Facility: CLINIC | Age: 82
End: 2022-09-06

## 2022-09-06 ENCOUNTER — LABORATORY RESULT (OUTPATIENT)
Age: 82
End: 2022-09-06

## 2022-09-06 ENCOUNTER — RESULT REVIEW (OUTPATIENT)
Age: 82
End: 2022-09-06

## 2022-09-06 VITALS
RESPIRATION RATE: 16 BRPM | WEIGHT: 105.38 LBS | BODY MASS INDEX: 19.64 KG/M2 | DIASTOLIC BLOOD PRESSURE: 88 MMHG | HEIGHT: 61.6 IN | OXYGEN SATURATION: 99 % | HEART RATE: 82 BPM | SYSTOLIC BLOOD PRESSURE: 147 MMHG | TEMPERATURE: 97.4 F

## 2022-09-06 LAB
BASOPHILS # BLD AUTO: 0.04 K/UL — SIGNIFICANT CHANGE UP (ref 0–0.2)
BASOPHILS NFR BLD AUTO: 0.5 % — SIGNIFICANT CHANGE UP (ref 0–2)
EOSINOPHIL # BLD AUTO: 0.08 K/UL — SIGNIFICANT CHANGE UP (ref 0–0.5)
EOSINOPHIL NFR BLD AUTO: 1 % — SIGNIFICANT CHANGE UP (ref 0–6)
HCT VFR BLD CALC: 36.8 % — SIGNIFICANT CHANGE UP (ref 34.5–45)
HGB BLD-MCNC: 11.8 G/DL — SIGNIFICANT CHANGE UP (ref 11.5–15.5)
IMM GRANULOCYTES NFR BLD AUTO: 0.4 % — SIGNIFICANT CHANGE UP (ref 0–1.5)
LYMPHOCYTES # BLD AUTO: 1.92 K/UL — SIGNIFICANT CHANGE UP (ref 1–3.3)
LYMPHOCYTES # BLD AUTO: 23.3 % — SIGNIFICANT CHANGE UP (ref 13–44)
MCHC RBC-ENTMCNC: 28.6 PG — SIGNIFICANT CHANGE UP (ref 27–34)
MCHC RBC-ENTMCNC: 32.1 G/DL — SIGNIFICANT CHANGE UP (ref 32–36)
MCV RBC AUTO: 89.3 FL — SIGNIFICANT CHANGE UP (ref 80–100)
MONOCYTES # BLD AUTO: 0.43 K/UL — SIGNIFICANT CHANGE UP (ref 0–0.9)
MONOCYTES NFR BLD AUTO: 5.2 % — SIGNIFICANT CHANGE UP (ref 2–14)
NEUTROPHILS # BLD AUTO: 5.75 K/UL — SIGNIFICANT CHANGE UP (ref 1.8–7.4)
NEUTROPHILS NFR BLD AUTO: 69.6 % — SIGNIFICANT CHANGE UP (ref 43–77)
NRBC # BLD: 0 /100 WBCS — SIGNIFICANT CHANGE UP (ref 0–0)
PLATELET # BLD AUTO: 748 K/UL — HIGH (ref 150–400)
RBC # BLD: 4.12 M/UL — SIGNIFICANT CHANGE UP (ref 3.8–5.2)
RBC # FLD: 14.4 % — SIGNIFICANT CHANGE UP (ref 10.3–14.5)
WBC # BLD: 8.25 K/UL — SIGNIFICANT CHANGE UP (ref 3.8–10.5)
WBC # FLD AUTO: 8.25 K/UL — SIGNIFICANT CHANGE UP (ref 3.8–10.5)

## 2022-09-06 PROCEDURE — 99214 OFFICE O/P EST MOD 30 MIN: CPT

## 2022-09-06 NOTE — PHYSICAL EXAM
[Fully active, able to carry on all pre-disease performance without restriction] : Status 0 - Fully active, able to carry on all pre-disease performance without restriction [Normal] : normal appearance, no rash, nodules, vesicles, ulcers, erythema [de-identified] : RRR. S1S2 normal. Gr 2/6 KARISHMA apex to left axilla. No gallops.

## 2022-09-06 NOTE — RESULTS/DATA
[FreeTextEntry1] : WBC 8,250 Hgb 11.8 Hct 36.8 Platelets 748,000 Diff normal\par \par 5/10/22\par CMP Na 133, BUN 26, eGFR 51\par \par igG 1648, A 466, M 178\par SFLC Kappa 6.46, Lambda 4.61, Ratio 1.40\par SPEP polyclonal\par SPIEP - no band\par \par

## 2022-09-06 NOTE — CONSULT LETTER
[Dear  ___] : Dear  [unfilled], [Courtesy Letter:] : I had the pleasure of seeing your patient, [unfilled], in my office today. [Please see my note below.] : Please see my note below. [Sincerely,] : Sincerely, [DrKyle  ___] : Dr. PALOMO [DrKyle ___] : Dr. PALOMO [FreeTextEntry2] : Bernice Chaudhary MD [FreeTextEntry3] : Octaviano Ching M.D., FACP\par Professor of Medicine\par [facility] [employer]\par Associate Chief, Division of Hematology\par [facility]\par [facility]\par [address] [place] [address]\[place] [address]\par [phone]\par \par \par \par

## 2022-09-06 NOTE — REVIEW OF SYSTEMS
[Fatigue] : fatigue [Dry Eyes] : dryness of the eyes [Joint Pain] : joint pain [Negative] : Integumentary [FreeTextEntry1] : Raynaud's
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
## 2022-09-06 NOTE — ADDENDUM
[FreeTextEntry1] : I, Mo Heredia, acted solely as a scribe for Dr. Octaviano Ching on 09/06/2022. All medical entries made by the Scribe were at my, Dr. Octaviano Ching's, direction and personally dictated by me on 09/06/2022. I have reviewed the chart and agree that the record accurately reflects my personal performance of the history, physical exam, assessment and plan. I have also personally directed, reviewed, and agreed with the chart.
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
## 2022-09-10 LAB
ALBUMIN SERPL ELPH-MCNC: 4.2 G/DL
ALP BLD-CCNC: 158 U/L
ALT SERPL-CCNC: 15 U/L
ANION GAP SERPL CALC-SCNC: 13 MMOL/L
AST SERPL-CCNC: 24 U/L
BILIRUB SERPL-MCNC: 0.7 MG/DL
BUN SERPL-MCNC: 20 MG/DL
CALCIUM SERPL-MCNC: 10.3 MG/DL
CHLORIDE SERPL-SCNC: 100 MMOL/L
CO2 SERPL-SCNC: 26 MMOL/L
CREAT SERPL-MCNC: 1.15 MG/DL
DEPRECATED KAPPA LC FREE/LAMBDA SER: 1.31 RATIO
DEPRECATED KAPPA LC FREE/LAMBDA SER: 1.31 RATIO
EGFR: 48 ML/MIN/1.73M2
GLUCOSE SERPL-MCNC: 88 MG/DL
IGA SER QL IEP: 471 MG/DL
IGG SER QL IEP: 1675 MG/DL
IGM SER QL IEP: 205 MG/DL
KAPPA LC CSF-MCNC: 5.17 MG/DL
KAPPA LC CSF-MCNC: 5.17 MG/DL
KAPPA LC SERPL-MCNC: 6.79 MG/DL
KAPPA LC SERPL-MCNC: 6.79 MG/DL
LDH SERPL-CCNC: 226 U/L
POTASSIUM SERPL-SCNC: 4.5 MMOL/L
PROT SERPL-MCNC: 7.6 G/DL
SODIUM SERPL-SCNC: 139 MMOL/L

## 2022-09-16 LAB
ALBUMIN MFR SERPL ELPH: 52 %
ALBUMIN SERPL-MCNC: 4 G/DL
ALBUMIN/GLOB SERPL: 1.1 RATIO
ALPHA1 GLOB MFR SERPL ELPH: 3.7 %
ALPHA1 GLOB SERPL ELPH-MCNC: 0.3 G/DL
ALPHA2 GLOB MFR SERPL ELPH: 8.3 %
ALPHA2 GLOB SERPL ELPH-MCNC: 0.6 G/DL
B-GLOBULIN MFR SERPL ELPH: 13.5 %
B-GLOBULIN SERPL ELPH-MCNC: 1 G/DL
GAMMA GLOB FLD ELPH-MCNC: 1.7 G/DL
GAMMA GLOB MFR SERPL ELPH: 22.5 %
INTERPRETATION SERPL IEP-IMP: NORMAL
M PROTEIN MFR SERPL ELPH: NORMAL
MONOCLON BAND OBS SERPL: NORMAL
PROT SERPL-MCNC: 7.6 G/DL
PROT SERPL-MCNC: 7.6 G/DL

## 2022-09-21 ENCOUNTER — EMERGENCY (EMERGENCY)
Facility: HOSPITAL | Age: 82
LOS: 1 days | Discharge: HOME | End: 2022-09-21
Attending: EMERGENCY MEDICINE | Admitting: EMERGENCY MEDICINE

## 2022-09-21 VITALS
HEIGHT: 62 IN | DIASTOLIC BLOOD PRESSURE: 81 MMHG | SYSTOLIC BLOOD PRESSURE: 137 MMHG | OXYGEN SATURATION: 100 % | HEART RATE: 80 BPM | TEMPERATURE: 98 F | RESPIRATION RATE: 18 BRPM

## 2022-09-21 DIAGNOSIS — S82.92XA UNSPECIFIED FRACTURE OF LEFT LOWER LEG, INITIAL ENCOUNTER FOR CLOSED FRACTURE: Chronic | ICD-10-CM

## 2022-09-21 LAB
ALBUMIN SERPL ELPH-MCNC: 3.7 G/DL — SIGNIFICANT CHANGE UP (ref 3.3–5)
ALP SERPL-CCNC: 92 U/L — SIGNIFICANT CHANGE UP (ref 40–120)
ALT FLD-CCNC: 18 U/L — SIGNIFICANT CHANGE UP (ref 4–33)
ANION GAP SERPL CALC-SCNC: 10 MMOL/L — SIGNIFICANT CHANGE UP (ref 7–14)
APPEARANCE UR: CLEAR — SIGNIFICANT CHANGE UP
AST SERPL-CCNC: 24 U/L — SIGNIFICANT CHANGE UP (ref 4–32)
BASOPHILS # BLD AUTO: 0.04 K/UL — SIGNIFICANT CHANGE UP (ref 0–0.2)
BASOPHILS NFR BLD AUTO: 0.4 % — SIGNIFICANT CHANGE UP (ref 0–2)
BILIRUB SERPL-MCNC: 0.6 MG/DL — SIGNIFICANT CHANGE UP (ref 0.2–1.2)
BILIRUB UR-MCNC: NEGATIVE — SIGNIFICANT CHANGE UP
BUN SERPL-MCNC: 20 MG/DL — SIGNIFICANT CHANGE UP (ref 7–23)
CALCIUM SERPL-MCNC: 9.3 MG/DL — SIGNIFICANT CHANGE UP (ref 8.4–10.5)
CHLORIDE SERPL-SCNC: 101 MMOL/L — SIGNIFICANT CHANGE UP (ref 98–107)
CO2 SERPL-SCNC: 25 MMOL/L — SIGNIFICANT CHANGE UP (ref 22–31)
COLOR SPEC: SIGNIFICANT CHANGE UP
CREAT SERPL-MCNC: 0.95 MG/DL — SIGNIFICANT CHANGE UP (ref 0.5–1.3)
DIFF PNL FLD: NEGATIVE — SIGNIFICANT CHANGE UP
EGFR: 60 ML/MIN/1.73M2 — SIGNIFICANT CHANGE UP
EOSINOPHIL # BLD AUTO: 0.06 K/UL — SIGNIFICANT CHANGE UP (ref 0–0.5)
EOSINOPHIL NFR BLD AUTO: 0.6 % — SIGNIFICANT CHANGE UP (ref 0–6)
FLUAV AG NPH QL: SIGNIFICANT CHANGE UP
FLUBV AG NPH QL: SIGNIFICANT CHANGE UP
GLUCOSE SERPL-MCNC: 86 MG/DL — SIGNIFICANT CHANGE UP (ref 70–99)
GLUCOSE UR QL: NEGATIVE — SIGNIFICANT CHANGE UP
HCT VFR BLD CALC: 35 % — SIGNIFICANT CHANGE UP (ref 34.5–45)
HGB BLD-MCNC: 11.1 G/DL — LOW (ref 11.5–15.5)
IANC: 7.27 K/UL — SIGNIFICANT CHANGE UP (ref 1.8–7.4)
IMM GRANULOCYTES NFR BLD AUTO: 0.6 % — SIGNIFICANT CHANGE UP (ref 0–0.9)
KETONES UR-MCNC: NEGATIVE — SIGNIFICANT CHANGE UP
LEUKOCYTE ESTERASE UR-ACNC: NEGATIVE — SIGNIFICANT CHANGE UP
LYMPHOCYTES # BLD AUTO: 2.17 K/UL — SIGNIFICANT CHANGE UP (ref 1–3.3)
LYMPHOCYTES # BLD AUTO: 21.4 % — SIGNIFICANT CHANGE UP (ref 13–44)
MAGNESIUM SERPL-MCNC: 1.8 MG/DL — SIGNIFICANT CHANGE UP (ref 1.6–2.6)
MCHC RBC-ENTMCNC: 28.2 PG — SIGNIFICANT CHANGE UP (ref 27–34)
MCHC RBC-ENTMCNC: 31.7 GM/DL — LOW (ref 32–36)
MCV RBC AUTO: 88.8 FL — SIGNIFICANT CHANGE UP (ref 80–100)
MONOCYTES # BLD AUTO: 0.54 K/UL — SIGNIFICANT CHANGE UP (ref 0–0.9)
MONOCYTES NFR BLD AUTO: 5.3 % — SIGNIFICANT CHANGE UP (ref 2–14)
NEUTROPHILS # BLD AUTO: 7.27 K/UL — SIGNIFICANT CHANGE UP (ref 1.8–7.4)
NEUTROPHILS NFR BLD AUTO: 71.7 % — SIGNIFICANT CHANGE UP (ref 43–77)
NITRITE UR-MCNC: NEGATIVE — SIGNIFICANT CHANGE UP
NRBC # BLD: 0 /100 WBCS — SIGNIFICANT CHANGE UP (ref 0–0)
NRBC # FLD: 0 K/UL — SIGNIFICANT CHANGE UP (ref 0–0)
PH UR: 7.5 — SIGNIFICANT CHANGE UP (ref 5–8)
PLATELET # BLD AUTO: 625 K/UL — HIGH (ref 150–400)
POTASSIUM SERPL-MCNC: 4.1 MMOL/L — SIGNIFICANT CHANGE UP (ref 3.5–5.3)
POTASSIUM SERPL-SCNC: 4.1 MMOL/L — SIGNIFICANT CHANGE UP (ref 3.5–5.3)
PROT SERPL-MCNC: 7.3 G/DL — SIGNIFICANT CHANGE UP (ref 6–8.3)
PROT UR-MCNC: ABNORMAL
RBC # BLD: 3.94 M/UL — SIGNIFICANT CHANGE UP (ref 3.8–5.2)
RBC # FLD: 14.9 % — HIGH (ref 10.3–14.5)
RSV RNA NPH QL NAA+NON-PROBE: SIGNIFICANT CHANGE UP
SARS-COV-2 RNA SPEC QL NAA+PROBE: SIGNIFICANT CHANGE UP
SODIUM SERPL-SCNC: 136 MMOL/L — SIGNIFICANT CHANGE UP (ref 135–145)
SP GR SPEC: 1.01 — LOW (ref 1.01–1.05)
UROBILINOGEN FLD QL: SIGNIFICANT CHANGE UP
WBC # BLD: 10.14 K/UL — SIGNIFICANT CHANGE UP (ref 3.8–10.5)
WBC # FLD AUTO: 10.14 K/UL — SIGNIFICANT CHANGE UP (ref 3.8–10.5)

## 2022-09-21 PROCEDURE — 99218: CPT

## 2022-09-21 PROCEDURE — 72125 CT NECK SPINE W/O DYE: CPT | Mod: 26,MA

## 2022-09-21 PROCEDURE — 72128 CT CHEST SPINE W/O DYE: CPT | Mod: 26,MA

## 2022-09-21 PROCEDURE — G1004: CPT

## 2022-09-21 PROCEDURE — 72131 CT LUMBAR SPINE W/O DYE: CPT | Mod: 26,ME

## 2022-09-21 RX ORDER — LIDOCAINE 4 G/100G
1 CREAM TOPICAL ONCE
Refills: 0 | Status: COMPLETED | OUTPATIENT
Start: 2022-09-21 | End: 2022-09-21

## 2022-09-21 RX ORDER — OXYCODONE HYDROCHLORIDE 5 MG/1
2.5 TABLET ORAL ONCE
Refills: 0 | Status: DISCONTINUED | OUTPATIENT
Start: 2022-09-21 | End: 2022-09-21

## 2022-09-21 RX ORDER — KETOROLAC TROMETHAMINE 30 MG/ML
30 SYRINGE (ML) INJECTION ONCE
Refills: 0 | Status: DISCONTINUED | OUTPATIENT
Start: 2022-09-21 | End: 2022-09-21

## 2022-09-21 RX ORDER — ACETAMINOPHEN 500 MG
650 TABLET ORAL EVERY 6 HOURS
Refills: 0 | Status: DISCONTINUED | OUTPATIENT
Start: 2022-09-21 | End: 2022-09-24

## 2022-09-21 RX ORDER — CYCLOBENZAPRINE HYDROCHLORIDE 10 MG/1
5 TABLET, FILM COATED ORAL THREE TIMES A DAY
Refills: 0 | Status: DISCONTINUED | OUTPATIENT
Start: 2022-09-21 | End: 2022-09-24

## 2022-09-21 RX ORDER — CYCLOBENZAPRINE HYDROCHLORIDE 10 MG/1
5 TABLET, FILM COATED ORAL ONCE
Refills: 0 | Status: COMPLETED | OUTPATIENT
Start: 2022-09-21 | End: 2022-09-21

## 2022-09-21 RX ADMIN — CYCLOBENZAPRINE HYDROCHLORIDE 5 MILLIGRAM(S): 10 TABLET, FILM COATED ORAL at 22:27

## 2022-09-21 RX ADMIN — CYCLOBENZAPRINE HYDROCHLORIDE 5 MILLIGRAM(S): 10 TABLET, FILM COATED ORAL at 11:53

## 2022-09-21 RX ADMIN — OXYCODONE HYDROCHLORIDE 2.5 MILLIGRAM(S): 5 TABLET ORAL at 16:27

## 2022-09-21 RX ADMIN — Medication 30 MILLIGRAM(S): at 11:53

## 2022-09-21 RX ADMIN — LIDOCAINE 1 PATCH: 4 CREAM TOPICAL at 23:00

## 2022-09-21 RX ADMIN — LIDOCAINE 1 PATCH: 4 CREAM TOPICAL at 11:55
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
## 2022-09-21 NOTE — ED CDU PROVIDER INITIAL DAY NOTE - NSICDXPASTSURGICALHX_GEN_ALL_CORE_FT
PAST SURGICAL HISTORY:  Leg fracture, left tibia - hardware insitu - 2009     complains of pain/discomfort
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
## 2022-09-21 NOTE — ED CDU PROVIDER INITIAL DAY NOTE - MEDICAL DECISION MAKING DETAILS
Patient is a 82 y.o female with PMH of DVT, PE on eliquis, HTN, Hip fx who presents to ED c/o back pain for few days a.w fall at home. Pt seen and worked up in ED; labs wnl, UA normal. CT spine + for T11 and L1 fx. Ortho/spine consulted. Recommend brace. Pt placed in CDU for; pain control and TLSO brace.

## 2022-09-21 NOTE — CONSULT NOTE ADULT - ATTENDING COMMENTS
Agree with above, neurologically intact. Mobilize as able.    TLSO brace for comfort  Close clinical f/u in office
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
## 2022-09-21 NOTE — ED ADULT NURSE REASSESSMENT NOTE - NS ED NURSE REASSESS COMMENT FT1
patient at baseline mental status, medication given and tolerated well per EMAR. patient advised not to walk around without help following pain medication and educated on side effects. all safety maintained at this time.

## 2022-09-21 NOTE — CONSULT NOTE ADULT - TIME BILLING
Please note that over 50 minutes of time was spent in care of this patient including  - previsit preparation  - in person visit  - discussion with colleagues  - review of imaging  - post visit documentation
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
## 2022-09-21 NOTE — ED CDU PROVIDER INITIAL DAY NOTE - PHYSICAL EXAMINATION
Vital signs reviewed.   CONSTITUTIONAL: Well-appearing; well-nourished; in no apparent distress. Non-toxic appearing.   HEAD: Normocephalic, atraumatic.  EYES: conjunctiva and sclera WNL.  RESP: NAD  EXT/MS: moves all extremities;   SKIN: Normal for age and race;   NEURO: Awake, alert, oriented x 3, no gross deficits  PSYCH: Normal mood; appropriate affect.

## 2022-09-21 NOTE — ED PROVIDER NOTE - PROGRESS NOTE DETAILS
IVANNA Drake: pt signed out to me pending ortho recommendations, ortho recommending TLSO brace, can be fitted in the morning. Spoke with CDU, accepts pt. Pt aware of overnight cdu stay for brace.

## 2022-09-22 VITALS
TEMPERATURE: 99 F | DIASTOLIC BLOOD PRESSURE: 80 MMHG | SYSTOLIC BLOOD PRESSURE: 145 MMHG | RESPIRATION RATE: 18 BRPM | OXYGEN SATURATION: 96 % | HEART RATE: 88 BPM

## 2022-09-22 PROCEDURE — 99283 EMERGENCY DEPT VISIT LOW MDM: CPT | Mod: 57

## 2022-09-22 PROCEDURE — 99217: CPT

## 2022-09-22 PROCEDURE — 22315 CLOSED TX VERT FX W/MANJ: CPT

## 2022-09-22 RX ORDER — VALSARTAN 80 MG/1
80 TABLET ORAL DAILY
Refills: 0 | Status: DISCONTINUED | OUTPATIENT
Start: 2022-09-22 | End: 2022-09-24

## 2022-09-22 RX ORDER — APIXABAN 2.5 MG/1
5 TABLET, FILM COATED ORAL EVERY 12 HOURS
Refills: 0 | Status: DISCONTINUED | OUTPATIENT
Start: 2022-09-22 | End: 2022-09-22

## 2022-09-22 RX ORDER — OXYCODONE AND ACETAMINOPHEN 5; 325 MG/1; MG/1
1 TABLET ORAL ONCE
Refills: 0 | Status: DISCONTINUED | OUTPATIENT
Start: 2022-09-22 | End: 2022-09-24

## 2022-09-22 RX ADMIN — CYCLOBENZAPRINE HYDROCHLORIDE 5 MILLIGRAM(S): 10 TABLET, FILM COATED ORAL at 10:44

## 2022-09-22 RX ADMIN — VALSARTAN 80 MILLIGRAM(S): 80 TABLET ORAL at 05:59
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
## 2022-09-22 NOTE — ED CDU PROVIDER DISPOSITION NOTE - PATIENT PORTAL LINK FT
You can access the FollowMyHealth Patient Portal offered by [facility] by registering at the following website: [email or web address]. By joining [facility]’s FollowMyHealth portal, you will also be able to view your health information using other applications (apps) compatible with our system.

## 2022-09-22 NOTE — ED CDU PROVIDER DISPOSITION NOTE - NSFOLLOWUPINSTRUCTIONS_ED_ALL_ED_FT
Follow up with Orthopedics Spine  Take Percocet 325/5 once every 6 hours as needed for severe pain -- causes drowsiness; DO NOT drink alcohol, drive, or operate heavy machinery with this medication.     Worsening, continued or new concerning symptoms return to the emergency department.

## 2022-09-22 NOTE — ED CDU PROVIDER SUBSEQUENT DAY NOTE - CPE EDP MUSC NORM
normal...
I have reviewed and confirmed nurses' notes for patient's medications, allergies, medical history, and surgical history.
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
## 2022-09-23 LAB
CULTURE RESULTS: SIGNIFICANT CHANGE UP
SPECIMEN SOURCE: SIGNIFICANT CHANGE UP

## 2022-09-28 ENCOUNTER — APPOINTMENT (OUTPATIENT)
Dept: ORTHOPEDIC SURGERY | Facility: CLINIC | Age: 82
End: 2022-09-28

## 2022-09-28 ENCOUNTER — NON-APPOINTMENT (OUTPATIENT)
Age: 82
End: 2022-09-28

## 2022-09-28 VITALS
HEIGHT: 61 IN | WEIGHT: 108 LBS | BODY MASS INDEX: 20.39 KG/M2 | SYSTOLIC BLOOD PRESSURE: 136 MMHG | DIASTOLIC BLOOD PRESSURE: 91 MMHG | HEART RATE: 102 BPM

## 2022-09-28 DIAGNOSIS — M84.48XA PATHOLOGICAL FRACTURE, OTHER SITE, INITIAL ENCOUNTER FOR FRACTURE: ICD-10-CM

## 2022-09-28 PROCEDURE — 99214 OFFICE O/P EST MOD 30 MIN: CPT

## 2022-09-28 PROCEDURE — 72170 X-RAY EXAM OF PELVIS: CPT

## 2022-09-28 PROCEDURE — 99072 ADDL SUPL MATRL&STAF TM PHE: CPT

## 2022-09-28 PROCEDURE — 72110 X-RAY EXAM L-2 SPINE 4/>VWS: CPT

## 2022-10-06 NOTE — ED ADULT TRIAGE NOTE - HEIGHT IN INCHES
Ruben Mitchell presents for a Comprehensive exam  Verbal consent for treatment given in addition to the forms  Reviewed health history - Patient is ASA I  Consents signed: Yes     Perio: Normal  Pain Scale: 0  Caries Assessment: Medium  Radiographs: None     Oral Hygiene instruction reviewed and given  Recommended Hygiene recall visits with the [facility]  Treatment Plan:  1  Infection control: referred for   2  Periodontal therapy: adult prophy/ [facility]  3  Caries control: as charted  4  Occlusal evaluation:   5  Case Difficulty Type 1  Prognosis is Good    Referrals needed: No  Next Visit:  fillings 4
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
## 2022-10-14 NOTE — PHYSICAL EXAM
[Stooped] : stooped [LE] : Sensory: Intact in bilateral lower extremities [0] : left ankle jerk 0 [DP] : dorsalis pedis 2+ and symmetric bilaterally [SLR] : negative straight leg raise [Plantar Reflex Right Only] : absent on the right [Plantar Reflex Left Only] : absent on the left [DTR Reflexes Clonus Of Right Ankle (___ Beats)] : absent on the right [DTR Reflexes Clonus Of Left Ankle (___ Beats)] : absent on the left [de-identified] : seen with her sister\par The pt is awake, alert and oriented to self, place and time, is comfortable and in no acute distress. Inspection of neck, back and lower extremities bilaterally reveals no rashes .  There is no obvious abnormal spinal curvature in the sagittal and coronal planes. There is generalized tenderness over the cervical, thoracic or lumbar spine, or the paraspinal or upper and lower extremities musculature. There is no sacroiliac tenderness. No greater trochanteric tenderness bilaterally. No atrophy or abnormal movements noted in the upper or lower extremities. There is no swelling noted in the upper or lower extremities bilaterally. No cervical lymphadenopathy noted anteriorly. No joint laxity noted in the upper and lower extremity joints bilaterally.\par Hip range of motion is degrees internal rotation 30° external rotation without pain. Full range of motion of the shoulders bilaterally with no significant pain\par There is no groin pain with hip internal rotation and a negative JANE test bilaterally.  [de-identified] : ecchymosis left lateral thigh [de-identified] : 4 views lumbar spine demonstrate partially visualized hip arthroplasty implants bilaterally.  Right-sided lumbar curve with trunk shift to the left suggesting primary deformity of the thoracic spine.  Diffuse osteopenia.  Normal lumbar lordosis.  Suggestion of compression deformity of T12 possibly superior plate of L1 as well as T11.  No dynamic instability between flexion-extension.\par \par AP pelvis demonstrates partially visualized bilateral hip arthroplasty implants.  No obvious acute fractures.\par \par ________\par \par \par ACC: [id number] EXAM: CT CERVICAL SPINE\par ACC: [id number] EXAM: CT THORACIC SPINE\par \par PROCEDURE DATE: 09/21/2022\par \par \par \par INTERPRETATION: CT cervical spine without IV contrast\par \par CLINICAL INFORMATION: Fracture, trauma, neck pain. Neck pain, spinal stenosis, spondylosis. LCTVertebral compression fx\par \par TECHNIQUE: Contiguous axial 2.0 mm sections were obtained through the cervical spine using a single helical acquisition. Additional 2 mm sagittal and coronal reformatted reconstructions of the spine were obtained. These additional reformatted images were used to evaluate the spine for alignment, vertebral fractures and the integrity of the the posterior elements. This scan was performed using automatic exposure control (radiation dose reduction software) to obtain a diagnostic image quality scan with patient dose as low as reasonably achievable.\par \par FINDINGS: No prior similar studies are available for review\par \par Cervical vertebral body heights are maintained. No vertebral fracture is seen. No destructive bone lesion is found. Degenerative endplate changes are seen at the superior endplate of C6. Alignment is preserved. Facet joints appear intact and aligned.\par \par Cervical intervertebral disc spaces show mild degenerative disc disease at C5-C6 with loss of disc height. There is narrowing of BILATERAL C4-5, C5-6 neural foramina due to uncovertebral spurring and facet osteophytic hypertrophy.\par \par The skull base appears intact. No neck mass is recognized. Paraspinal soft tissues appear intact. Visualized lymph nodes appear to be within physiologic size limits. Scarring is seen at the LEFT lung apex. Nodularity involves the thyroid gland. Small dystrophic calcification is seen in the RIGHT lobe.\par \par \par \par CT thoracic spine without IV contrast\par \par CLINICAL INFORMATION: Back pain, spinal stenosis, spondylosis.\par \par TECHNIQUE: Contiguous axial 2 mm sections were obtained through the thoracic spine using a single helical acquisition. Additional 2 mm sagittal and coronal reconstructions of the spine were obtained. These additional reformatted images were used to evaluate the spine for alignment, vertebral fractures and the integrity of the the posterior elements. This scan was performed using automatic exposure control (radiation dose reduction software) to obtain a diagnostic image quality scan with patient dose as low as reasonably achievable.\par \par FINDINGS: No prior similar studies are available for review\par \par Thoracic vertebral body heights demonstrate an acute burst fracture of the T11 vertebral body with loss of 40% of vertebral body height but no significant bony retropulsion. Vertebral plana is noted at T3 and T6 with bony retropulsion, which appears chronic. No destructive bone lesion is found. Alignment is preserved. Facet joints appear intact and aligned.\par \par Thoracic intervertebral disc spaces show mild to degenerative disc disease and spondylosis at 7-8 through T10-11 with loss of disc height, associated degenerative endplate changes and vacuum phenomenon. Tiny LEFT paracentral disc herniation at T9-T10 which narrows the LEFT neural foramen.\par \par No paraspinal mass is recognized. Paraspinal soft tissues appear intact.\par \par Tiny nodules in the RIGHT upper lobe measuring 5 mm and less in size.\par \par \par IMPRESSION:\par \par CT cervical spine: No vertebral fracture is recognized. Mild degenerative disc disease at C5-C6 with loss of disc height. There is narrowing of BILATERAL C4-5, C5-6 neural foramina due to uncovertebral spurring and facet osteophytic hypertrophy.\par \par CT thoracic spine: acute burst fracture of the T11 vertebral body with loss of 40% of vertebral body height but no significant bony retropulsion. Vertebral plana is noted at T3 and T6 with bony retropulsion, which appears chronic. mild to degenerative disc disease and spondylosis at 7-8 through T10-11 with loss of disc height, associated degenerative endplate changes and vacuum phenomenon. Tiny LEFT paracentral disc herniation at T9-T10 which narrows the LEFT neural foramen. Tiny nodules in the RIGHT upper lobe measuring 5 mm and less in size.\par \par \par --- End of Report ---\par \par \par LORI SNIDER MD; Attending Radiologist\par This document has been electronically signed. Sep 21 2022 6:53PM

## 2022-10-16 ENCOUNTER — OUTPATIENT (OUTPATIENT)
Dept: OUTPATIENT SERVICES | Facility: HOSPITAL | Age: 82
LOS: 1 days | End: 2022-10-16
Payer: COMMERCIAL

## 2022-10-16 ENCOUNTER — APPOINTMENT (OUTPATIENT)
Dept: MRI IMAGING | Facility: CLINIC | Age: 82
End: 2022-10-16

## 2022-10-16 DIAGNOSIS — S82.92XA UNSPECIFIED FRACTURE OF LEFT LOWER LEG, INITIAL ENCOUNTER FOR CLOSED FRACTURE: Chronic | ICD-10-CM

## 2022-10-16 DIAGNOSIS — M54.16 RADICULOPATHY, LUMBAR REGION: ICD-10-CM

## 2022-10-16 PROCEDURE — 72146 MRI CHEST SPINE W/O DYE: CPT | Mod: 26

## 2022-10-16 PROCEDURE — 72148 MRI LUMBAR SPINE W/O DYE: CPT | Mod: 26

## 2022-10-16 PROCEDURE — 72148 MRI LUMBAR SPINE W/O DYE: CPT

## 2022-10-16 PROCEDURE — 72146 MRI CHEST SPINE W/O DYE: CPT

## 2022-10-31 ENCOUNTER — APPOINTMENT (OUTPATIENT)
Dept: ORTHOPEDIC SURGERY | Facility: CLINIC | Age: 82
End: 2022-10-31

## 2022-10-31 VITALS
HEIGHT: 61 IN | DIASTOLIC BLOOD PRESSURE: 93 MMHG | HEART RATE: 103 BPM | SYSTOLIC BLOOD PRESSURE: 146 MMHG | BODY MASS INDEX: 20.39 KG/M2 | WEIGHT: 108 LBS

## 2022-10-31 DIAGNOSIS — M84.48XS PATHOLOGICAL FRACTURE, OTHER SITE, SEQUELA: ICD-10-CM

## 2022-10-31 PROCEDURE — 72070 X-RAY EXAM THORAC SPINE 2VWS: CPT

## 2022-10-31 PROCEDURE — 99214 OFFICE O/P EST MOD 30 MIN: CPT | Mod: 57

## 2022-10-31 PROCEDURE — 72100 X-RAY EXAM L-S SPINE 2/3 VWS: CPT

## 2022-10-31 PROCEDURE — 99072 ADDL SUPL MATRL&STAF TM PHE: CPT

## 2022-11-02 ENCOUNTER — OUTPATIENT (OUTPATIENT)
Dept: OUTPATIENT SERVICES | Facility: HOSPITAL | Age: 82
LOS: 1 days | Discharge: HOME | End: 2022-11-02

## 2022-11-02 DIAGNOSIS — D47.2 MONOCLONAL GAMMOPATHY: ICD-10-CM

## 2022-11-02 DIAGNOSIS — S82.92XA UNSPECIFIED FRACTURE OF LEFT LOWER LEG, INITIAL ENCOUNTER FOR CLOSED FRACTURE: Chronic | ICD-10-CM

## 2022-11-02 NOTE — PROVIDER CONTACT NOTE (OTHER) - ASSESSMENT
The Spine Pre-Operative Education packet was given to the patient on 10/31/22. The patient and NP reviewed the information included in the packet. All her questions were answered and she gave a clear understanding of the instructions. She was advised to call the office at any time with further questions or concerns.

## 2022-11-04 ENCOUNTER — OUTPATIENT (OUTPATIENT)
Dept: OUTPATIENT SERVICES | Facility: HOSPITAL | Age: 82
LOS: 1 days | End: 2022-11-04
Payer: COMMERCIAL

## 2022-11-04 VITALS
SYSTOLIC BLOOD PRESSURE: 156 MMHG | TEMPERATURE: 97 F | HEIGHT: 60 IN | HEART RATE: 90 BPM | WEIGHT: 106.04 LBS | RESPIRATION RATE: 14 BRPM | OXYGEN SATURATION: 98 % | DIASTOLIC BLOOD PRESSURE: 88 MMHG

## 2022-11-04 DIAGNOSIS — Z95.828 PRESENCE OF OTHER VASCULAR IMPLANTS AND GRAFTS: Chronic | ICD-10-CM

## 2022-11-04 DIAGNOSIS — M48.48XS FATIGUE FRACTURE OF VERTEBRA, SACRAL AND SACROCOCCYGEAL REGION, SEQUELA OF FRACTURE: ICD-10-CM

## 2022-11-04 DIAGNOSIS — Z98.890 OTHER SPECIFIED POSTPROCEDURAL STATES: Chronic | ICD-10-CM

## 2022-11-04 DIAGNOSIS — Z01.818 ENCOUNTER FOR OTHER PREPROCEDURAL EXAMINATION: ICD-10-CM

## 2022-11-04 DIAGNOSIS — Z96.642 PRESENCE OF LEFT ARTIFICIAL HIP JOINT: Chronic | ICD-10-CM

## 2022-11-04 DIAGNOSIS — Z96.641 PRESENCE OF RIGHT ARTIFICIAL HIP JOINT: Chronic | ICD-10-CM

## 2022-11-04 DIAGNOSIS — S22.000A WEDGE COMPRESSION FRACTURE OF UNSPECIFIED THORACIC VERTEBRA, INITIAL ENCOUNTER FOR CLOSED FRACTURE: ICD-10-CM

## 2022-11-04 DIAGNOSIS — S82.92XA UNSPECIFIED FRACTURE OF LEFT LOWER LEG, INITIAL ENCOUNTER FOR CLOSED FRACTURE: Chronic | ICD-10-CM

## 2022-11-04 DIAGNOSIS — Z98.49 CATARACT EXTRACTION STATUS, UNSPECIFIED EYE: Chronic | ICD-10-CM

## 2022-11-04 LAB
A1C WITH ESTIMATED AVERAGE GLUCOSE RESULT: 5.6 % — SIGNIFICANT CHANGE UP (ref 4–5.6)
ALBUMIN SERPL ELPH-MCNC: 3.5 G/DL — SIGNIFICANT CHANGE UP (ref 3.3–5)
ALP SERPL-CCNC: 139 U/L — HIGH (ref 30–120)
ALT FLD-CCNC: 25 U/L DA — SIGNIFICANT CHANGE UP (ref 10–60)
ANION GAP SERPL CALC-SCNC: 10 MMOL/L — SIGNIFICANT CHANGE UP (ref 5–17)
APTT BLD: 27.7 SEC — SIGNIFICANT CHANGE UP (ref 27.5–35.5)
AST SERPL-CCNC: 27 U/L — SIGNIFICANT CHANGE UP (ref 10–40)
BILIRUB SERPL-MCNC: 0.9 MG/DL — SIGNIFICANT CHANGE UP (ref 0.2–1.2)
BLD GP AB SCN SERPL QL: SIGNIFICANT CHANGE UP
BUN SERPL-MCNC: 32 MG/DL — HIGH (ref 7–23)
CALCIUM SERPL-MCNC: 9.5 MG/DL — SIGNIFICANT CHANGE UP (ref 8.4–10.5)
CHLORIDE SERPL-SCNC: 99 MMOL/L — SIGNIFICANT CHANGE UP (ref 96–108)
CO2 SERPL-SCNC: 27 MMOL/L — SIGNIFICANT CHANGE UP (ref 22–31)
CREAT SERPL-MCNC: 1.01 MG/DL — SIGNIFICANT CHANGE UP (ref 0.5–1.3)
EGFR: 56 ML/MIN/1.73M2 — LOW
ESTIMATED AVERAGE GLUCOSE: 114 MG/DL — SIGNIFICANT CHANGE UP (ref 68–114)
GLUCOSE SERPL-MCNC: 74 MG/DL — SIGNIFICANT CHANGE UP (ref 70–99)
HCT VFR BLD CALC: 34 % — LOW (ref 34.5–45)
HGB BLD-MCNC: 10.9 G/DL — LOW (ref 11.5–15.5)
INR BLD: 0.99 RATIO — SIGNIFICANT CHANGE UP (ref 0.88–1.16)
MCHC RBC-ENTMCNC: 28.7 PG — SIGNIFICANT CHANGE UP (ref 27–34)
MCHC RBC-ENTMCNC: 32.1 GM/DL — SIGNIFICANT CHANGE UP (ref 32–36)
MCV RBC AUTO: 89.5 FL — SIGNIFICANT CHANGE UP (ref 80–100)
NRBC # BLD: 0 /100 WBCS — SIGNIFICANT CHANGE UP (ref 0–0)
PLATELET # BLD AUTO: 770 K/UL — HIGH (ref 150–400)
POTASSIUM SERPL-MCNC: 3.7 MMOL/L — SIGNIFICANT CHANGE UP (ref 3.5–5.3)
POTASSIUM SERPL-SCNC: 3.7 MMOL/L — SIGNIFICANT CHANGE UP (ref 3.5–5.3)
PROT SERPL-MCNC: 8.2 G/DL — SIGNIFICANT CHANGE UP (ref 6–8.3)
PROTHROM AB SERPL-ACNC: 11.7 SEC — SIGNIFICANT CHANGE UP (ref 10.5–13.4)
RBC # BLD: 3.8 M/UL — SIGNIFICANT CHANGE UP (ref 3.8–5.2)
RBC # FLD: 15.4 % — HIGH (ref 10.3–14.5)
SODIUM SERPL-SCNC: 136 MMOL/L — SIGNIFICANT CHANGE UP (ref 135–145)
WBC # BLD: 10.43 K/UL — SIGNIFICANT CHANGE UP (ref 3.8–10.5)
WBC # FLD AUTO: 10.43 K/UL — SIGNIFICANT CHANGE UP (ref 3.8–10.5)

## 2022-11-04 PROCEDURE — G0463: CPT

## 2022-11-04 RX ORDER — ASPIRIN/CALCIUM CARB/MAGNESIUM 324 MG
0 TABLET ORAL
Qty: 0 | Refills: 0 | DISCHARGE

## 2022-11-04 RX ORDER — AMLODIPINE BESYLATE 2.5 MG/1
1 TABLET ORAL
Qty: 0 | Refills: 0 | DISCHARGE

## 2022-11-04 RX ORDER — VALSARTAN 80 MG/1
1 TABLET ORAL
Qty: 0 | Refills: 0 | DISCHARGE
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
## 2022-11-04 NOTE — H&P PST ADULT - NSICDXPASTSURGICALHX_GEN_ALL_CORE_FT
PAST SURGICAL HISTORY:  S/P cataract surgery     S/P hip replacement, left 2016    S/P hip replacement, right 2019, revision on 2021 s/p fall    S/P IVC filter 2019 then removed 2019    S/P ORIF (open reduction internal fixation) fracture right femur fracture 2021    S/P ORIF (open reduction internal fixation) fracture left wrist 2014    S/P ORIF (open reduction internal fixation) fracture left tibia fracture 2009

## 2022-11-04 NOTE — H&P PST ADULT - LYMPHATIC
posterior cervical L/posterior cervical R/anterior cervical L/anterior cervical R/supraclavicular L/supraclavicular R anterior cervical L/anterior cervical R

## 2022-11-04 NOTE — H&P PST ADULT - PROBLEM SELECTOR PLAN 1
scheduled for kyphoplasty T11 L1 on 11/10/22  will obtain medical and cardiac clearance   Pre op instructions medications  last hematology note in the chart   COVID test on 11/8/22 at 10 am

## 2022-11-04 NOTE — H&P PST ADULT - NSICDXFAMILYHX_GEN_ALL_CORE_FT
FAMILY HISTORY:  Mother  Still living? Unknown  Family history of factor V deficiency, Age at diagnosis: Age Unknown    Sibling  Still living? No  Family history of factor V deficiency, Age at diagnosis: Age Unknown
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
## 2022-11-04 NOTE — H&P PST ADULT - MUSCULOSKELETAL
right arm (wrist)/decreased ROM due to pain/joint swelling/joint warmth details… decreased ROM due to pain/joint warmth/back exam

## 2022-11-07 PROBLEM — C44.91 BASAL CELL CARCINOMA OF SKIN, UNSPECIFIED: Chronic | Status: ACTIVE | Noted: 2022-11-04

## 2022-11-07 PROBLEM — D50.9 IRON DEFICIENCY ANEMIA, UNSPECIFIED: Chronic | Status: ACTIVE | Noted: 2022-11-04

## 2022-11-07 PROBLEM — D47.3 ESSENTIAL (HEMORRHAGIC) THROMBOCYTHEMIA: Chronic | Status: ACTIVE | Noted: 2022-11-04

## 2022-11-07 PROBLEM — I26.99 OTHER PULMONARY EMBOLISM WITHOUT ACUTE COR PULMONALE: Chronic | Status: ACTIVE | Noted: 2022-11-04

## 2022-11-07 PROBLEM — D68.51 ACTIVATED PROTEIN C RESISTANCE: Chronic | Status: ACTIVE | Noted: 2022-11-04

## 2022-11-07 PROBLEM — M06.9 RHEUMATOID ARTHRITIS, UNSPECIFIED: Chronic | Status: ACTIVE | Noted: 2022-11-04

## 2022-11-07 PROBLEM — R01.1 CARDIAC MURMUR, UNSPECIFIED: Chronic | Status: ACTIVE | Noted: 2022-11-04

## 2022-11-07 PROBLEM — D47.2 MONOCLONAL GAMMOPATHY: Chronic | Status: ACTIVE | Noted: 2022-11-04

## 2022-11-07 PROBLEM — S22.000A WEDGE COMPRESSION FRACTURE OF UNSPECIFIED THORACIC VERTEBRA, INITIAL ENCOUNTER FOR CLOSED FRACTURE: Chronic | Status: ACTIVE | Noted: 2022-11-04

## 2022-11-10 ENCOUNTER — APPOINTMENT (OUTPATIENT)
Dept: ORTHOPEDIC SURGERY | Facility: HOSPITAL | Age: 82
End: 2022-11-10
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
## 2022-11-23 NOTE — PHYSICAL EXAM
[Stooped] : stooped [LE] : Sensory: Intact in bilateral lower extremities [0] : left ankle jerk 0 [DP] : dorsalis pedis 2+ and symmetric bilaterally [SLR] : negative straight leg raise [Plantar Reflex Right Only] : absent on the right [Plantar Reflex Left Only] : absent on the left [DTR Reflexes Clonus Of Right Ankle (___ Beats)] : absent on the right [DTR Reflexes Clonus Of Left Ankle (___ Beats)] : absent on the left [de-identified] : seen with her daughter\par The pt is awake, alert and oriented to self, place and time, is comfortable and in no acute distress. Inspection of neck, back and lower extremities bilaterally reveals no rashes .  There is no obvious abnormal spinal curvature in the sagittal and coronal planes. There is generalized tenderness over the cervical, thoracic or lumbar spine, or the paraspinal or upper and lower extremities musculature. There is no sacroiliac tenderness. No greater trochanteric tenderness bilaterally. No atrophy or abnormal movements noted in the upper or lower extremities. There is no swelling noted in the upper or lower extremities bilaterally. No cervical lymphadenopathy noted anteriorly. No joint laxity noted in the upper and lower extremity joints bilaterally.\par Hip range of motion is degrees internal rotation 30° external rotation without pain. Full range of motion of the shoulders bilaterally with no significant pain\par There is no groin pain with hip internal rotation and a negative JANE test bilaterally.  [de-identified] : AP and lateral image of thoracic spine demonstrates increased thoracic kyphosis.  Previously noted T11 compression fracture is again seen as well as L1.  Diffuse osteopenia.  Trunk shift to the left noted with a right-sided thoracolumbar curve.\par \par AP lateral lumbar spine demonstrates partially visualized hip arthroplasty implants.  Right-sided lumbar curve.  On the lateral projection normal lumbar lordosis.  Diffuse osteopenia.  Previously noted L1 and T11 compression fractures are again seen.\par \par __________\par \par EXAM: [id number] - MR SPINE LUMBAR - ORDERED BY: CAROL CROUCH\par \par EXAM: [id number] - MR SPINE THORACIC - ORDERED BY: CAROL CROUCH\par \par PROCEDURE DATE: 10/16/2022\par \par INTERPRETATION: CLINICAL INDICATION: Fracture of T12 and L1.\par \par Multiplanar Multisequence MR of the thoracic and lumbar spine\par \par Prior Studies: CT of the thoracic and lumbar spine from September 21, 2022.\par \par FINDINGS:\par \par THORACIC SPINE:\par \par ALIGNMENT: There is slight levoscoliosis of the lower thoracic spine. There is exaggeration of the thoracic kyphosis. There is mild retrolisthesis of T11 on T12 which is progressed since the prior examination. There is unchanged trace retrolisthesis of T12 on L1.\par \par VERTEBRAL BODIES AND MARROW: Chronic severe compression deformities of the T3 and T6 vertebral bodies are again noted with slight osseous retropulsion, unchanged. There is an acute to subacute severe compression fracture of the T11 vertebral body. The degree of vertebral body height loss is significantly progressed compared to the prior examination. There is new osseous retropulsion along the posterior cortex of the T11 vertebral body. There is a lipid poor osseous hemangioma within the T7 vertebral body. Edematous end plate changes are seen at T10/T11.\par \par DISC SPACES: There is multilevel disc desiccation. There is mild to moderate disc height loss at T2/T3, T3/T4, T5/T6, T6/T7, T10/T11, and T11/T12.\par \par THORACIC CORD: Thoracic cord is normal in caliber and signal characteristics.\par \par IMAGED THORACIC SOFT TISSUES: Parapelvic renal cysts are seen bilaterally.\par \par The findings at the individual levels are as follows:\par \par C7/T1 through T2/T3: There is no spinal canal or neural foraminal narrowing.\par \par T3/T4: There is mild osseous retropulsion along the posterior cortex of the T2 vertebral body resulting in mild flattening of the ventral thecal sac without contacting of the cord. There is mild bilateral neural foraminal narrowing.\par \par T4/T5: There is no spinal canal or neural foraminal narrowing.\par \par T5/T6: There is a mild diffuse disc bulge. There is mild osseous retropulsion resulting in flattening of the ventral thecal sac with near contacting of the right ventral cord. There is mild bilateral neural foraminal narrowing. Degree of spinal canal and neural foraminal narrowing appears grossly similar compared to the prior CT.\par \par T6/T7: There is a minimal disc bulge. There is mild bilateral neural foraminal narrowing. There is no central canal narrowing.\par \par T7/T8 through T9/T10: There is no spinal canal or neural foraminal narrowing.\par \par T10/T11 and T11/T12: Compared to the previous examination there is increased loss of height and increased osseous retropulsion along the posterior cortex of the T11 vertebral body with new mild retrolisthesis. Findings result in flattening of the ventral thecal sac with near contacting of the ventral cord. There is mild bilateral neural foraminal narrowing at both of these levels.\par \par T12/L1: There is bilateral facet arthrosis. There is minimal disc bulge. There is no spinal canal or neural foraminal narrowing.\par \par LUMBAR SPINE:\par \par ALIGNMENT: There is dextroscoliosis of the lumbar spine. Lumbar lordosis is maintained. There is trace retrolisthesis of L5 on S1.\par \par VERTEBRAL BODIES AND MARROW: There is an acute mild to moderate compression deformity of the L1 vertebral body with concavity along both the superior and inferior endplates. There is slight osseous retropulsion along the posterior inferior endplate. This is grossly similar compared to the prior examination. Mild edematous endplate changes are seen at L1/L2. There is a partially imaged acute to subacute left sacral insufficiency fracture.\par \par DISC SPACES: Disc heights are preserved aside from mild disc height loss at L5/S1.\par \par SACROILIAC JOINTS: There is mild bilateral sacroiliac joint arthrosis.\par \par CONUS AND CAUDA EQUINA: Conus is normal in morphology terminating at the level of L2. Small Tarlov cyst formation is seen at the level of S2 measuring approximately 1.3 x 1.4 cm. There is a subcentimeter right renal cyst.\par \par IMAGED ABDOMINAL AND PELVIC STRUCTURES: Intact.\par \par The findings at the individual levels are as follows:\par \par L1/2: There is minimal disc bulge with posterior osseous ridging. There is mild bilateral neural foraminal narrowing. There is no central canal narrowing.\par \par L2/3: There is a mild diffuse disc bulge with posterior osseous ridging. There is bilateral facet arthrosis. There is mild to moderate bilateral neural foraminal narrowing. There is mild spinal canal narrowing.\par \par L3/4: There is a mild diffuse disc bulge with bilateral facet arthrosis and ligamentum flavum hypertrophy. Findings result in moderate spinal canal narrowing and mild bilateral neural foraminal narrowing.\par \par L4/5: There is a mild diffuse disc bulge with a superimposed central disc protrusion. There is bilateral facet arthrosis. Findings result in moderate to severe spinal canal narrowing and mild bilateral neural foraminal narrowing.\par \par L5/S1: There is a mild diffuse disc bulge with a superimposed central disc contusion. There is minimal bilateral neural foraminal narrowing. There is no central canal narrowing.\par \par IMPRESSION:\par \par THORACIC SPINE:\par \par Acute to subacute severe compression deformity of the T11 vertebral body. The degree of vertebral body height loss is progressed compared to the prior examination. Compared to the previous examination there is also increased osseous retropulsion along the posterior cortex of T11 and new mild retrolisthesis of T11 on T12. Osseous retropulsion of the T11 vertebral body results in flattening of the ventral thecal sac with near contacting the ventral cord.\par \par Chronic severe compression deformities of the T3 and T6 vertebral bodies, unchanged.\par \par Multilevel thoracic spondylosis as outlined above. Levoscoliosis.\par \par LUMBAR SPINE:\par \par Acute mild to moderate compression deformity L1 vertebral body with minimal osseous retropulsion along the posterior inferior endplate. No associated spinal canal narrowing.\par \par Acute partially imaged left sacral insufficiency fracture.\par \par Multilevel lumbar spondylosis. Trace retrolisthesis of L5 on S1. Dextroscoliosis.\par \par L2/3: There is mild to moderate bilateral neural foraminal narrowing. There is mild spinal canal narrowing.\par \par L3/4: There is moderate spinal canal narrowing and mild bilateral neural foraminal narrowing.\par \par L4/5: There is moderate to severe spinal canal narrowing and mild bilateral neural foraminal narrowing.\par \par Follow-up imaging is recommended to confirm reconstitution of fatty marrow signal is within the T11 and L1 vertebral bodies and exclude any underlying pathologic etiology.\par \par --- End of Report ---\par \par JI PEREZ MD; Attending Radiologist\par This document has been electronically signed. Oct 17 2022 9:16AM

## 2022-11-23 NOTE — DISCUSSION/SUMMARY
[Medication Risks Reviewed] : Medication risks reviewed [de-identified] : MRI of the thoracic and lumbar spine performed previously was independently reviewed by me and findings discussed with patient and her daughter.  She has right-sided lumbar scoliosis with Tarlov cyst level of S2.  Acute to subacute severe compression of the T11 vertebral bodies noted with worsening compared to CT scan from September 2022.  Chronic changes are seen at T3 and 6 consistent with severe compression deformity which is stable from prior studies.  Severe spinal stenosis noted at L4-5 and moderate stenosis L3-4.  Acute compression fracture of the L1 is seen as well no narrowing of the spinal canal.  Narrowing is seen at the T11 level.\par \par At this point discussed the option of cement augmentation of the T11 and L1 fractures the patient is not interested in that.  Recommendation was made for her to see an endocrinologist again for long-term management of osteopenia porosis.  She may continue use of the lidocaine patch.\par \par Recommended continued use of TLSO.  She could not tolerate oral medications because of constipation requiring a visit to the emergency room.   She is interested in a cement augmentation procedure for the T11 and L1 vertebral bodies and we will try and schedule at her earliest convenience\par \par The patient was advised that based upon their clinical presentation and radiographic findings they meet inclusion criteria for spinal surgery to address their spinal pathology.\par The spectrum of treatments for their spinal condition were reviewed in detail. The goals, alternatives, risks and benefits of spinal surgery were reviewed in detail with the patient.  \par Benefits and risks of operative and nonoperative treatment for the patient's pathology were outlined at length with the patient.  Specifically, those risks are understood to be, but not limited to, bleeding, infection, fracture (both during surgery and after surgery), adjacent level disease, failure to heal (significantly increased by smoking), need for further surgery, failure of instrumentation (if used), recurrence of problem and symptoms, neurovascular injury, dural tears or leaks resulting in spinal fluid leakage and requiring additional invasive and non-invasive treatments, need for additional procedures, possible paralysis resulting in loss of use of arms, legs, bowel and bladder function as well as sexual dysfunction, and anesthetic risks including death. \par Available alternatives to surgery were also discussed with the patient. In addition, the patient further understands that there may be indicated need for somatosensory evoked potential monitoring, real-time EMG monitoring, and motor evoked potential monitoring during the procedure along with placement of needle electrodes. A neuromonitoring service will discuss the risks and benefits separately with the patient.\par The patient also understands that having a surgical procedure and being hospitalized carries risks in addition to the ones described above.\par \par The patient was advised that Dr. Jaffe operates as a surgical team with his associate Dr. Gonzalez and/or with surgical Physician Assistants (PA)\par \par The patient was advised that they will require a medical preoperative risk evaluation by their PCP. Further medical subspecialty clearances such as cardiac may be indicated if felt needed by their PCP.\par \par The patient was advised he/she may call our office after careful consideration of their treatment options and further consultation with any other physician to begin the process of preoperative planning for elective spinal surgery at a time of their choosing. \par The patient verbalized an understanding of the procedure, its indications, and its common potential complications and attendant risks, and is willing to proceed. No guarantees were made with regard to a complete recovery. We will proceed in a timely manner after obtaining medical clearance.

## 2022-11-23 NOTE — HISTORY OF PRESENT ILLNESS
[8] : a current pain level of 8/10 [Sitting] : sitting [Daily] : ~He/She~ states the symptoms seem to be occuring daily [Prolonged Sitting] : worsened by prolonged sitting [Walking] : worsened by walking [de-identified] : Patient is here today to review mri's thoracic and lumbar spine 10/16/22. Patient is wearing her TLSO brace feels slightly better but still having pain stopped pain medicine due to being constipated and having to go to the emergency room. [de-identified] : standing lying in bed

## 2022-11-28 ENCOUNTER — APPOINTMENT (OUTPATIENT)
Dept: ORTHOPEDIC SURGERY | Facility: CLINIC | Age: 82
End: 2022-11-28

## 2022-11-28 VITALS
DIASTOLIC BLOOD PRESSURE: 83 MMHG | HEART RATE: 81 BPM | WEIGHT: 108 LBS | HEIGHT: 61 IN | BODY MASS INDEX: 20.39 KG/M2 | SYSTOLIC BLOOD PRESSURE: 176 MMHG

## 2022-11-28 DIAGNOSIS — M51.36 OTHER INTERVERTEBRAL DISC DEGENERATION, LUMBAR REGION: ICD-10-CM

## 2022-11-28 DIAGNOSIS — R26.81 UNSTEADINESS ON FEET: ICD-10-CM

## 2022-11-28 DIAGNOSIS — M84.48XS PATHOLOGICAL FRACTURE, OTHER SITE, SEQUELA: ICD-10-CM

## 2022-11-28 PROCEDURE — 72100 X-RAY EXAM L-S SPINE 2/3 VWS: CPT

## 2022-11-28 PROCEDURE — 99214 OFFICE O/P EST MOD 30 MIN: CPT

## 2022-11-28 PROCEDURE — 99072 ADDL SUPL MATRL&STAF TM PHE: CPT

## 2022-11-28 PROCEDURE — 72070 X-RAY EXAM THORAC SPINE 2VWS: CPT
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
## 2022-11-28 NOTE — DISCUSSION/SUMMARY
[Medication Risks Reviewed] : Medication risks reviewed [de-identified] : At this point patient continues to complain of back pain.  Appears to be radiographically healed with no significant interval change noted in appearance of her x-rays.  We will start physical therapy and recommended weaning off the TLSO over the next month.  She can follow-up with me on an as-needed basis.\par \par For pain referral to pain management was provided.  She has also been instructed to see an endocrinologist for further management of her condition of osteoporosis.\par \par
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
## 2022-11-28 NOTE — PHYSICAL EXAM
[Stooped] : stooped [Cane] : ambulates with cane [LE] : Sensory: Intact in bilateral lower extremities [0] : left ankle jerk 0 [DP] : dorsalis pedis 2+ and symmetric bilaterally [SLR] : negative straight leg raise [Plantar Reflex Right Only] : absent on the right [Plantar Reflex Left Only] : absent on the left [DTR Reflexes Clonus Of Right Ankle (___ Beats)] : absent on the right [DTR Reflexes Clonus Of Left Ankle (___ Beats)] : absent on the left [de-identified] : seen with her son\par The pt is awake, alert and oriented to self, place and time, is comfortable and in no acute distress. Inspection of neck, back and lower extremities bilaterally reveals no rashes .  There is no obvious abnormal spinal curvature in the sagittal and coronal planes. There is generalized tenderness over the cervical, thoracic or lumbar spine, or the paraspinal or upper and lower extremities musculature. There is no sacroiliac tenderness. No greater trochanteric tenderness bilaterally. No atrophy or abnormal movements noted in the upper or lower extremities. There is no swelling noted in the upper or lower extremities bilaterally. No cervical lymphadenopathy noted anteriorly. No joint laxity noted in the upper and lower extremity joints bilaterally.\par Hip range of motion is degrees internal rotation 30° external rotation without pain. Full range of motion of the shoulders bilaterally with no significant pain\par There is no groin pain with hip internal rotation and a negative JANE test bilaterally.  [de-identified] : AP and lateral image of thoracic spine demonstrates increased thoracic kyphosis.  Previously noted T11 compression fracture is again seen as well as L1.  Diffuse osteopenia.  Trunk shift to the left noted with a right-sided thoracolumbar curve.\par \par AP lateral lumbar spine demonstrates partially visualized hip arthroplasty implants.  Right-sided lumbar curve.  On the lateral projection normal lumbar lordosis.  Diffuse osteopenia.  Previously noted L1 and T11 compression fractures are again seen.\par \par No interval change appearance of her spine compared with previously performed x-rays on October 31, 2022.\par __________\par \par EXAM: [id number] - MR SPINE LUMBAR - ORDERED BY: CAROL CROUCH\par \par EXAM: [id number] - MR SPINE THORACIC - ORDERED BY: CAROL CROUCH\par \par PROCEDURE DATE: 10/16/2022\par \par INTERPRETATION: CLINICAL INDICATION: Fracture of T12 and L1.\par \par Multiplanar Multisequence MR of the thoracic and lumbar spine\par \par Prior Studies: CT of the thoracic and lumbar spine from September 21, 2022.\par \par FINDINGS:\par \par THORACIC SPINE:\par \par ALIGNMENT: There is slight levoscoliosis of the lower thoracic spine. There is exaggeration of the thoracic kyphosis. There is mild retrolisthesis of T11 on T12 which is progressed since the prior examination. There is unchanged trace retrolisthesis of T12 on L1.\par \par VERTEBRAL BODIES AND MARROW: Chronic severe compression deformities of the T3 and T6 vertebral bodies are again noted with slight osseous retropulsion, unchanged. There is an acute to subacute severe compression fracture of the T11 vertebral body. The degree of vertebral body height loss is significantly progressed compared to the prior examination. There is new osseous retropulsion along the posterior cortex of the T11 vertebral body. There is a lipid poor osseous hemangioma within the T7 vertebral body. Edematous end plate changes are seen at T10/T11.\par \par DISC SPACES: There is multilevel disc desiccation. There is mild to moderate disc height loss at T2/T3, T3/T4, T5/T6, T6/T7, T10/T11, and T11/T12.\par \par THORACIC CORD: Thoracic cord is normal in caliber and signal characteristics.\par \par IMAGED THORACIC SOFT TISSUES: Parapelvic renal cysts are seen bilaterally.\par \par The findings at the individual levels are as follows:\par \par C7/T1 through T2/T3: There is no spinal canal or neural foraminal narrowing.\par \par T3/T4: There is mild osseous retropulsion along the posterior cortex of the T2 vertebral body resulting in mild flattening of the ventral thecal sac without contacting of the cord. There is mild bilateral neural foraminal narrowing.\par \par T4/T5: There is no spinal canal or neural foraminal narrowing.\par \par T5/T6: There is a mild diffuse disc bulge. There is mild osseous retropulsion resulting in flattening of the ventral thecal sac with near contacting of the right ventral cord. There is mild bilateral neural foraminal narrowing. Degree of spinal canal and neural foraminal narrowing appears grossly similar compared to the prior CT.\par \par T6/T7: There is a minimal disc bulge. There is mild bilateral neural foraminal narrowing. There is no central canal narrowing.\par \par T7/T8 through T9/T10: There is no spinal canal or neural foraminal narrowing.\par \par T10/T11 and T11/T12: Compared to the previous examination there is increased loss of height and increased osseous retropulsion along the posterior cortex of the T11 vertebral body with new mild retrolisthesis. Findings result in flattening of the ventral thecal sac with near contacting of the ventral cord. There is mild bilateral neural foraminal narrowing at both of these levels.\par \par T12/L1: There is bilateral facet arthrosis. There is minimal disc bulge. There is no spinal canal or neural foraminal narrowing.\par \par LUMBAR SPINE:\par \par ALIGNMENT: There is dextroscoliosis of the lumbar spine. Lumbar lordosis is maintained. There is trace retrolisthesis of L5 on S1.\par \par VERTEBRAL BODIES AND MARROW: There is an acute mild to moderate compression deformity of the L1 vertebral body with concavity along both the superior and inferior endplates. There is slight osseous retropulsion along the posterior inferior endplate. This is grossly similar compared to the prior examination. Mild edematous endplate changes are seen at L1/L2. There is a partially imaged acute to subacute left sacral insufficiency fracture.\par \par DISC SPACES: Disc heights are preserved aside from mild disc height loss at L5/S1.\par \par SACROILIAC JOINTS: There is mild bilateral sacroiliac joint arthrosis.\par \par CONUS AND CAUDA EQUINA: Conus is normal in morphology terminating at the level of L2. Small Tarlov cyst formation is seen at the level of S2 measuring approximately 1.3 x 1.4 cm. There is a subcentimeter right renal cyst.\par \par IMAGED ABDOMINAL AND PELVIC STRUCTURES: Intact.\par \par The findings at the individual levels are as follows:\par \par L1/2: There is minimal disc bulge with posterior osseous ridging. There is mild bilateral neural foraminal narrowing. There is no central canal narrowing.\par \par L2/3: There is a mild diffuse disc bulge with posterior osseous ridging. There is bilateral facet arthrosis. There is mild to moderate bilateral neural foraminal narrowing. There is mild spinal canal narrowing.\par \par L3/4: There is a mild diffuse disc bulge with bilateral facet arthrosis and ligamentum flavum hypertrophy. Findings result in moderate spinal canal narrowing and mild bilateral neural foraminal narrowing.\par \par L4/5: There is a mild diffuse disc bulge with a superimposed central disc protrusion. There is bilateral facet arthrosis. Findings result in moderate to severe spinal canal narrowing and mild bilateral neural foraminal narrowing.\par \par L5/S1: There is a mild diffuse disc bulge with a superimposed central disc contusion. There is minimal bilateral neural foraminal narrowing. There is no central canal narrowing.\par \par IMPRESSION:\par \par THORACIC SPINE:\par \par Acute to subacute severe compression deformity of the T11 vertebral body. The degree of vertebral body height loss is progressed compared to the prior examination. Compared to the previous examination there is also increased osseous retropulsion along the posterior cortex of T11 and new mild retrolisthesis of T11 on T12. Osseous retropulsion of the T11 vertebral body results in flattening of the ventral thecal sac with near contacting the ventral cord.\par \par Chronic severe compression deformities of the T3 and T6 vertebral bodies, unchanged.\par \par Multilevel thoracic spondylosis as outlined above. Levoscoliosis.\par \par LUMBAR SPINE:\par \par Acute mild to moderate compression deformity L1 vertebral body with minimal osseous retropulsion along the posterior inferior endplate. No associated spinal canal narrowing.\par \par Acute partially imaged left sacral insufficiency fracture.\par \par Multilevel lumbar spondylosis. Trace retrolisthesis of L5 on S1. Dextroscoliosis.\par \par L2/3: There is mild to moderate bilateral neural foraminal narrowing. There is mild spinal canal narrowing.\par \par L3/4: There is moderate spinal canal narrowing and mild bilateral neural foraminal narrowing.\par \par L4/5: There is moderate to severe spinal canal narrowing and mild bilateral neural foraminal narrowing.\par \par Follow-up imaging is recommended to confirm reconstitution of fatty marrow signal is within the T11 and L1 vertebral bodies and exclude any underlying pathologic etiology.\par \par --- End of Report ---\par \par JI PEREZ MD; Attending Radiologist\par This document has been electronically signed. Oct 17 2022 9:16AM

## 2022-12-01 NOTE — HISTORY OF PRESENT ILLNESS
[Disease:__________________________] : Disease: [unfilled] [de-identified] : Factor V Leiden heterozygote\par LLE DVT post-op\par 8/2019 PE after fracturing hip and a long flight; IVC filter placed and removed. 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 Ambulating more. Pt reports chronic upper back pain-has known stenosis.  She is constipated with bowel movements every 3 days-takes stool softener. Scheduled to see GI. No melena, rectal bleeding, stool changes. Has chronic headaches. Has senile purpura on her arms. Reportedly has right hydronephrosis. Being followed by Nephrology. No other complaints. She notes no fever, night sweats, swollen glands, new visual problems, bleeding, bruising, other skeletal pain, chest pain, shortness of breath, abdominal pain, leg pain/swelling, foot pain, pruritus. Weight stable. Has Raynaud's. Wears TEDS stockings but reports difficultyjwearing them in the summer with the heat.  RA is controlled. Had both COVID vaccines and booster x 2. Had fallen the end of June, hit head; no SAH or fractures noted; denies LOC. \par \par  cognitive limitations (E4) spontaneous

## 2022-12-02 ENCOUNTER — APPOINTMENT (OUTPATIENT)
Dept: HEMATOLOGY ONCOLOGY | Facility: CLINIC | Age: 82
End: 2022-12-02

## 2022-12-02 ENCOUNTER — RESULT REVIEW (OUTPATIENT)
Age: 82
End: 2022-12-02

## 2022-12-02 VITALS
DIASTOLIC BLOOD PRESSURE: 90 MMHG | SYSTOLIC BLOOD PRESSURE: 150 MMHG | RESPIRATION RATE: 16 BRPM | TEMPERATURE: 97.9 F | WEIGHT: 106.92 LBS | OXYGEN SATURATION: 97 % | HEART RATE: 86 BPM | BODY MASS INDEX: 20.2 KG/M2

## 2022-12-02 LAB
BASOPHILS # BLD AUTO: 0.04 K/UL — SIGNIFICANT CHANGE UP (ref 0–0.2)
BASOPHILS NFR BLD AUTO: 0.5 % — SIGNIFICANT CHANGE UP (ref 0–2)
EOSINOPHIL # BLD AUTO: 0.14 K/UL — SIGNIFICANT CHANGE UP (ref 0–0.5)
EOSINOPHIL NFR BLD AUTO: 1.6 % — SIGNIFICANT CHANGE UP (ref 0–6)
HCT VFR BLD CALC: 35.2 % — SIGNIFICANT CHANGE UP (ref 34.5–45)
HGB BLD-MCNC: 11.3 G/DL — LOW (ref 11.5–15.5)
IMM GRANULOCYTES NFR BLD AUTO: 0.5 % — SIGNIFICANT CHANGE UP (ref 0–0.9)
LYMPHOCYTES # BLD AUTO: 1.61 K/UL — SIGNIFICANT CHANGE UP (ref 1–3.3)
LYMPHOCYTES # BLD AUTO: 18.9 % — SIGNIFICANT CHANGE UP (ref 13–44)
MCHC RBC-ENTMCNC: 28.8 PG — SIGNIFICANT CHANGE UP (ref 27–34)
MCHC RBC-ENTMCNC: 32.1 G/DL — SIGNIFICANT CHANGE UP (ref 32–36)
MCV RBC AUTO: 89.6 FL — SIGNIFICANT CHANGE UP (ref 80–100)
MONOCYTES # BLD AUTO: 0.53 K/UL — SIGNIFICANT CHANGE UP (ref 0–0.9)
MONOCYTES NFR BLD AUTO: 6.2 % — SIGNIFICANT CHANGE UP (ref 2–14)
NEUTROPHILS # BLD AUTO: 6.17 K/UL — SIGNIFICANT CHANGE UP (ref 1.8–7.4)
NEUTROPHILS NFR BLD AUTO: 72.3 % — SIGNIFICANT CHANGE UP (ref 43–77)
NRBC # BLD: 0 /100 WBCS — SIGNIFICANT CHANGE UP (ref 0–0)
PLATELET # BLD AUTO: 672 K/UL — HIGH (ref 150–400)
RBC # BLD: 3.93 M/UL — SIGNIFICANT CHANGE UP (ref 3.8–5.2)
RBC # FLD: 15.1 % — HIGH (ref 10.3–14.5)
WBC # BLD: 8.53 K/UL — SIGNIFICANT CHANGE UP (ref 3.8–10.5)
WBC # FLD AUTO: 8.53 K/UL — SIGNIFICANT CHANGE UP (ref 3.8–10.5)

## 2022-12-02 PROCEDURE — 99214 OFFICE O/P EST MOD 30 MIN: CPT

## 2022-12-02 NOTE — PHYSICAL EXAM
[Restricted in physically strenuous activity but ambulatory and able to carry out work of a light or sedentary nature] : Status 1- Restricted in physically strenuous activity but ambulatory and able to carry out work of a light or sedentary nature, e.g., light house work, office work [Normal] : affect appropriate [de-identified] : RRR. S1S2 normal. Gr 2/6 KARISHMA apex to left axilla. No gallops.

## 2022-12-02 NOTE — ADDENDUM
[FreeTextEntry1] : I, Mo Heredia, acted solely as a scribe for Dr. Octaviano Ching on 12/02/2022. All medical entries made by the Scribe were at my, Dr. Octaviano Ching's, direction and personally dictated by me on 12/02/2022. I have reviewed the chart and agree that the record accurately reflects my personal performance of the history, physical exam, assessment and plan. I have also personally directed, reviewed, and agreed with the chart.

## 2022-12-02 NOTE — REVIEW OF SYSTEMS
[Fatigue] : fatigue [Dry Eyes] : dryness of the eyes [Joint Pain] : joint pain [Negative] : Neurological [FreeTextEntry1] : Raynaud's

## 2022-12-02 NOTE — CONSULT LETTER
[Dear  ___] : Dear  [unfilled], [Courtesy Letter:] : I had the pleasure of seeing your patient, [unfilled], in my office today. [Please see my note below.] : Please see my note below. [Sincerely,] : Sincerely, [DrKyle  ___] : Dr. PALOMO [DrKyle ___] : Dr. PALOMO [FreeTextEntry2] : Bernice Chaudhary MD [FreeTextEntry3] : Octaviano Ching M.D., FACP\par Professor of Medicine\par [employer]\par Associate Chief, Division of Hematology\par [facility]\par [facility]\par [address] [place] [address]\[place] [address]\par [phone]\par \par \par \par

## 2022-12-02 NOTE — ASSESSMENT
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 She has a prior history of provoked DVT and is heterozygous for Factor V Leiden. She suffered a PE following fracture of her hip and a long airplane flight. This event was provoked. I do not believe that lifelong Eliquis anticoagulation is indicated and I am concerned that the risks of bleeding in this elderly woman would outweigh the benefit. Completed 6 months of Eliquis and then switched to ASA prophylaxis as before. 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 Platelets are mildly elevated and stable. She is doing well on ASA prophylaxis. Will continue to observe especially as I am concerned re: the risk:benefit ratio of hydroxyurea in her at this time. \par \par Plan:\par ASA\par Jobst stockings\par Endocrinology referral for osteoporosis\par CMP, LDH, SPEP, Quant Ig, SFLC\par RTC 3 months\par
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
## 2022-12-02 NOTE — RESULTS/DATA
[FreeTextEntry1] : WBC 8,530 Hgb 11.3 Hct 35.2 Platelets 672,000 Diff normal\par \par 9/6/22\par CMP ALK Phos 158, eGFR 48\par \par SPEP M-spike unable to quantitate. Two Gamma-Migrating Paraproteins Identified\par SPIEP One Weak IgM Kappa Band and One Weak IgM Lambda Band Identified\par IgG 1675, IgA 471, IgM 205\par SFLC kappa 6.79, lambda 5.17, ratio 1.31\par \par 5/10/22\par CMP Na 133, BUN 26, eGFR 51\par \par igG 1648, A 466, M 178\par SFLC Kappa 6.46, Lambda 4.61, Ratio 1.40\par SPEP polyclonal\par SPIEP - no band\par \par

## 2022-12-05 LAB
ALBUMIN SERPL ELPH-MCNC: 4 G/DL
ALP BLD-CCNC: 107 U/L
ALT SERPL-CCNC: 21 U/L
ANION GAP SERPL CALC-SCNC: 11 MMOL/L
AST SERPL-CCNC: 27 U/L
BILIRUB SERPL-MCNC: 0.4 MG/DL
BUN SERPL-MCNC: 20 MG/DL
CALCIUM SERPL-MCNC: 9.8 MG/DL
CHLORIDE SERPL-SCNC: 98 MMOL/L
CO2 SERPL-SCNC: 25 MMOL/L
CREAT SERPL-MCNC: 1.1 MG/DL
EGFR: 50 ML/MIN/1.73M2
GLUCOSE SERPL-MCNC: 87 MG/DL
LDH SERPL-CCNC: 241 U/L
POTASSIUM SERPL-SCNC: 4.7 MMOL/L
PROT SERPL-MCNC: 7.5 G/DL
SODIUM SERPL-SCNC: 134 MMOL/L

## 2022-12-06 ENCOUNTER — APPOINTMENT (OUTPATIENT)
Dept: ENDOCRINOLOGY | Facility: CLINIC | Age: 82
End: 2022-12-06

## 2022-12-06 VITALS
OXYGEN SATURATION: 99 % | HEIGHT: 59.9 IN | DIASTOLIC BLOOD PRESSURE: 90 MMHG | HEART RATE: 83 BPM | BODY MASS INDEX: 20.81 KG/M2 | SYSTOLIC BLOOD PRESSURE: 142 MMHG | RESPIRATION RATE: 14 BRPM | WEIGHT: 106 LBS

## 2022-12-06 DIAGNOSIS — M40.209 UNSPECIFIED KYPHOSIS, SITE UNSPECIFIED: ICD-10-CM

## 2022-12-06 DIAGNOSIS — M48.54XS COLLAPSED VERTEBRA, NOT ELSEWHERE CLASSIFIED, THORACIC REGION, SEQUELA OF FRACTURE: ICD-10-CM

## 2022-12-06 PROCEDURE — 77080 DXA BONE DENSITY AXIAL: CPT

## 2022-12-06 PROCEDURE — ZZZZZ: CPT

## 2022-12-06 PROCEDURE — 99205 OFFICE O/P NEW HI 60 MIN: CPT | Mod: 25

## 2022-12-06 NOTE — ASSESSMENT
[Denosumab Therapy] : Risks  and benefits of denosumab therapy were discussed with the patient including eczema, cellulitis, osteonecrosis of the jaw and atypical femur fractures [Bisphosphonate Therapy] : Risks and benefits of bisphosphonate therapy were  discussed with the patient including gastroesophageal irritation, osteonecrosis of the jaw, and atypical femur fractures, and acute phase reaction [FreeTextEntry1] : 81 yo F with PMHx significant for MGUS and multiple osteoporotic fractures of hip and spine (most recently T11 while not on OP therapy) presents for Osteoporosis management. Current medications include PO calcium and Vitamin D. Previously on Prolia in 2017 but discontinued after two doses and Fosamax prior to Prolia for many years. DEXA performed in office today reveals T score of -2.3 of L spine and -1.2 of left forearm. \par Bone density is better than expected given fracture history.  However patient remains at high risk for future fracture due to recurrence of fractures.  The patient has no unusual risk factors except for monoclonal gammopathy.\par Due to previous hx of multiple osteoporotic fractures she warrants treatment.\par \par Plan\par Options of medical therapy were discussed in great detail.\par - PTH analogs relatively contraindicated due to hx of MGUS\par - recommend starting Reclast IV or Prolia injections\par - both medications discussed with patient including risks and benefits\par Patient appears to be a good candidate for intravenous Reclast which should help with protection against future fracture as well as help control monoclonal gammopathy.\par - will discuss with Dr. Ching choice of therapy\par - c/w Vitamin D supplementation\par - will eventually need Vitamin D 25 level (last checked Feb 2021)\par \par Discussed with Dr. Buitrago\par \par Kleber Thornton MD\par Rheumatology Fellow, PGY-5\par Pharmacological Management of Osteoporosis in Postmenopausal Women: An Endocrine Society  Clinical Practice Guideline. Lela R, Uma TABARES., Cortez DM, Roland AM, Violet MH, Tracey GARZA. J Clin Endocrinol Metab. 2019  May 1;104(5):3776-8156, Also see  Update Nick JCEM 2020, 105: [phone]

## 2022-12-06 NOTE — CONSULT LETTER
[Dear  ___] : Dear  [unfilled], [Consult Letter:] : I had the pleasure of evaluating your patient, [unfilled]. [Please see my note below.] : Please see my note below. [Consult Closing:] : Thank you very much for allowing me to participate in the care of this patient.  If you have any questions, please do not hesitate to contact me. [FreeTextEntry2] : Octaviano Ching MD [DrKyle  ___] : Dr. PALOMO

## 2022-12-06 NOTE — PROCEDURE
[FreeTextEntry1] : Bone mineral density December 6, 2022 \par L3–4 -2.3 osteopenia\par Hips not performed bilateral hip replacement\par Proximal radius -1.2 osteopenia

## 2022-12-06 NOTE — REASON FOR VISIT
[Osteoporosis] : osteoporosis [Family Member] : family member [Consultation] : a consultation visit [FreeTextEntry2] : Octaviano Ching MD

## 2022-12-06 NOTE — PHYSICAL EXAM
[Alert] : alert [No Acute Distress] : no acute distress [Normal Sclera/Conjunctiva] : normal sclera/conjunctiva [No Neck Mass] : no neck mass was observed [No LAD] : no lymphadenopathy [Clear to Auscultation] : lungs were clear to auscultation bilaterally [Normal S1, S2] : normal S1 and S2 [Regular Rhythm] : with a regular rhythm [Not Tender] : non-tender [Soft] : abdomen soft [Kyphosis] : kyphosis present [No Rash] : no rash [de-identified] : Missing teeth upper molars bilaterally. [de-identified] : Ribs at pelvis [de-identified] : Walks with cane

## 2022-12-06 NOTE — HISTORY OF PRESENT ILLNESS
[Hip Surgery] : hip surgery [Alendronate (Fosomax)] : Alendronate [Denosumab (Prolia)] : Denosumab [Calcium (dietary)] : dietary Calcium [Vitamin D (oral)] : Vitamin D orally [Previous Fragility Fracture] : previous fragility fracture(s) [Frequent Falls] : frequent falls [Uses a Walker/Cane] : use of a walker/cane [Loss of Height] : loss of height [FreeTextEntry1] : 81 yo F with PMHx significant for MGUS presents for referral for Osteoporosis evaluation.\par The patient has a long history of osteoporosis and fractures.\par Recalls being on Fosamax for many years until 2017. Then switched to Prolia due to mouth sores. Received two doses of Prolia in 2017 and then stopped because she was concerned it was causing her to loose teeth. \par \par She has had 5 fractures in the past with the most recent in 2021. 2009 tibial fracture. 2013 hip fracture followed by surgical replacement. 2015? wrist fracture. 2019 hip fracture with hip replacement. 2021 hip and femur fracture (of replaced joint).  The patient has had multiple compression fractures in the past, the most recent compression fracture appears to be T11 this year. \par The patient has no obvious unusual risk factors for osteoporosis.\par \par Denies currently having liver, kidney, thyroid disease. Denies hx of kidney stones or abnormal calcium levels.\par No hx of smoking. No hx of malabsorption.\par Age of Menopause: Early 50s \par Currently on Vit D and Calcium tablets.\par Last dental visit 7 months ago. No current plans for major dental work. [FreeTextEntry4] : Calcium oral

## 2022-12-07 DIAGNOSIS — M81.0 AGE-RELATED OSTEOPOROSIS W/OUT CURRENT PATHOLOGICAL FRACTURE: ICD-10-CM

## 2022-12-08 ENCOUNTER — APPOINTMENT (OUTPATIENT)
Dept: PULMONOLOGY | Facility: CLINIC | Age: 82
End: 2022-12-08

## 2022-12-08 VITALS
DIASTOLIC BLOOD PRESSURE: 80 MMHG | SYSTOLIC BLOOD PRESSURE: 148 MMHG | HEART RATE: 83 BPM | BODY MASS INDEX: 20.39 KG/M2 | WEIGHT: 108 LBS | HEIGHT: 61 IN | TEMPERATURE: 98.7 F

## 2022-12-08 DIAGNOSIS — R93.89 ABNORMAL FINDINGS ON DIAGNOSTIC IMAGING OF OTHER SPECIFIED BODY STRUCTURES: ICD-10-CM

## 2022-12-08 PROCEDURE — ZZZZZ: CPT

## 2022-12-08 PROCEDURE — 94729 DIFFUSING CAPACITY: CPT

## 2022-12-08 PROCEDURE — 94010 BREATHING CAPACITY TEST: CPT

## 2022-12-08 PROCEDURE — 94726 PLETHYSMOGRAPHY LUNG VOLUMES: CPT

## 2022-12-08 PROCEDURE — 99203 OFFICE O/P NEW LOW 30 MIN: CPT | Mod: 25
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
## 2022-12-08 NOTE — PHYSICAL EXAM
[No Acute Distress] : no acute distress [Normal Oropharynx] : normal oropharynx [Normal Appearance] : normal appearance [Normal Rate/Rhythm] : normal rate/rhythm [Normal S1, S2] : normal s1, s2 [No Murmurs] : no murmurs [No Resp Distress] : no resp distress [Clear to Auscultation Bilaterally] : clear to auscultation bilaterally [No Abnormalities] : no abnormalities [Benign] : benign [No Clubbing] : no clubbing [No Edema] : no edema [Normal Color/ Pigmentation] : normal color/ pigmentation [No Focal Deficits] : no focal deficits [Oriented x3] : oriented x3 [Normal Affect] : normal affect [TextBox_99] : ambulates with cane

## 2022-12-21 RX ORDER — ZOLEDRONIC ACID 5 MG/100ML
5 INJECTION INTRAVENOUS
Qty: 0 | Refills: 0 | Status: COMPLETED | OUTPATIENT
Start: 2022-12-21 | End: 1900-01-01

## 2023-01-01 NOTE — PATIENT PROFILE ADULT - BILL PAYMENT
-- DO NOT REPLY / DO NOT REPLY ALL --  -- Message is from Engagement Center Operations (ECO) --    General Patient Message: dad requesting a new born visit on Monday 11/06 or Tuesday 11/7 with Dr Ramirez   Baby was born at [facility] on Sunday 11/5     Caller Information       Type Contact Phone/Fax    2023 03:10 PM CDT Phone (Incoming) Simba Horner (Father) [phone]        Alternative phone number: none    Can a detailed message be left? Yes    Message Turnaround:     Is it Working Hours? Yes - Working Hours     [place]:    Please give this turnaround time to the caller:   \"This message will be sent to [state Provider's name]. The clinical team will fulfill your request as soon as they review your message.\"                
Called and lvm asking paremt to return call. I was trying to offer a appt.   
no

## 2023-01-19 ENCOUNTER — APPOINTMENT (OUTPATIENT)
Dept: RHEUMATOLOGY | Facility: CLINIC | Age: 83
End: 2023-01-19

## 2023-02-19 ENCOUNTER — RESULT REVIEW (OUTPATIENT)
Age: 83
End: 2023-02-19

## 2023-02-22 ENCOUNTER — RESULT REVIEW (OUTPATIENT)
Age: 83
End: 2023-02-22

## 2023-03-01 ENCOUNTER — OUTPATIENT (OUTPATIENT)
Dept: OUTPATIENT SERVICES | Facility: HOSPITAL | Age: 83
LOS: 1 days | Discharge: HOME | End: 2023-03-01

## 2023-03-01 DIAGNOSIS — Z96.641 PRESENCE OF RIGHT ARTIFICIAL HIP JOINT: Chronic | ICD-10-CM

## 2023-03-01 DIAGNOSIS — Z98.890 OTHER SPECIFIED POSTPROCEDURAL STATES: Chronic | ICD-10-CM

## 2023-03-01 DIAGNOSIS — Z95.828 PRESENCE OF OTHER VASCULAR IMPLANTS AND GRAFTS: Chronic | ICD-10-CM

## 2023-03-01 DIAGNOSIS — Z96.642 PRESENCE OF LEFT ARTIFICIAL HIP JOINT: Chronic | ICD-10-CM

## 2023-03-01 DIAGNOSIS — Z98.49 CATARACT EXTRACTION STATUS, UNSPECIFIED EYE: Chronic | ICD-10-CM

## 2023-03-01 DIAGNOSIS — D47.2 MONOCLONAL GAMMOPATHY: ICD-10-CM

## 2023-03-07 ENCOUNTER — APPOINTMENT (OUTPATIENT)
Dept: HEMATOLOGY ONCOLOGY | Facility: CLINIC | Age: 83
End: 2023-03-07

## 2023-03-31 ENCOUNTER — APPOINTMENT (OUTPATIENT)
Dept: PULMONOLOGY | Facility: CLINIC | Age: 83
End: 2023-03-31

## 2023-04-06 ENCOUNTER — APPOINTMENT (OUTPATIENT)
Dept: ENDOCRINOLOGY | Facility: CLINIC | Age: 83
End: 2023-04-06

## 2023-05-24 ENCOUNTER — OUTPATIENT (OUTPATIENT)
Dept: OUTPATIENT SERVICES | Facility: HOSPITAL | Age: 83
LOS: 1 days | Discharge: HOME | End: 2023-05-24

## 2023-05-24 DIAGNOSIS — Z95.828 PRESENCE OF OTHER VASCULAR IMPLANTS AND GRAFTS: Chronic | ICD-10-CM

## 2023-05-24 DIAGNOSIS — Z96.642 PRESENCE OF LEFT ARTIFICIAL HIP JOINT: Chronic | ICD-10-CM

## 2023-05-24 DIAGNOSIS — D47.2 MONOCLONAL GAMMOPATHY: ICD-10-CM

## 2023-05-24 DIAGNOSIS — Z98.890 OTHER SPECIFIED POSTPROCEDURAL STATES: Chronic | ICD-10-CM

## 2023-05-24 DIAGNOSIS — Z96.641 PRESENCE OF RIGHT ARTIFICIAL HIP JOINT: Chronic | ICD-10-CM

## 2023-05-24 DIAGNOSIS — Z98.49 CATARACT EXTRACTION STATUS, UNSPECIFIED EYE: Chronic | ICD-10-CM

## 2023-05-26 ENCOUNTER — NON-APPOINTMENT (OUTPATIENT)
Age: 83
End: 2023-05-26

## 2023-05-30 ENCOUNTER — APPOINTMENT (OUTPATIENT)
Dept: HEMATOLOGY ONCOLOGY | Facility: CLINIC | Age: 83
End: 2023-05-30

## 2023-05-30 ENCOUNTER — NON-APPOINTMENT (OUTPATIENT)
Age: 83
End: 2023-05-30

## 2023-05-31 ENCOUNTER — APPOINTMENT (OUTPATIENT)
Dept: HEMATOLOGY ONCOLOGY | Facility: CLINIC | Age: 83
End: 2023-05-31

## 2023-05-31 ENCOUNTER — RESULT REVIEW (OUTPATIENT)
Age: 83
End: 2023-05-31

## 2023-05-31 ENCOUNTER — LABORATORY RESULT (OUTPATIENT)
Age: 83
End: 2023-05-31

## 2023-05-31 ENCOUNTER — APPOINTMENT (OUTPATIENT)
Dept: HEMATOLOGY ONCOLOGY | Facility: CLINIC | Age: 83
End: 2023-05-31
Payer: MEDICARE

## 2023-05-31 VITALS
RESPIRATION RATE: 20 BRPM | WEIGHT: 99.21 LBS | BODY MASS INDEX: 18.74 KG/M2 | HEART RATE: 60 BPM | SYSTOLIC BLOOD PRESSURE: 131 MMHG | DIASTOLIC BLOOD PRESSURE: 78 MMHG | TEMPERATURE: 97 F | OXYGEN SATURATION: 99 %

## 2023-05-31 DIAGNOSIS — Z87.81 PERSONAL HISTORY OF (HEALED) TRAUMATIC FRACTURE: ICD-10-CM

## 2023-05-31 DIAGNOSIS — E03.9 HYPOTHYROIDISM, UNSPECIFIED: ICD-10-CM

## 2023-05-31 LAB
ALBUMIN SERPL ELPH-MCNC: 4 G/DL
ALP BLD-CCNC: 133 U/L
ALT SERPL-CCNC: 19 U/L
ANION GAP SERPL CALC-SCNC: 11 MMOL/L
AST SERPL-CCNC: 22 U/L
BASOPHILS # BLD AUTO: 0.06 K/UL — SIGNIFICANT CHANGE UP (ref 0–0.2)
BASOPHILS NFR BLD AUTO: 0.9 % — SIGNIFICANT CHANGE UP (ref 0–2)
BILIRUB SERPL-MCNC: 0.5 MG/DL
BUN SERPL-MCNC: 32 MG/DL
CALCIUM SERPL-MCNC: 10 MG/DL
CHLORIDE SERPL-SCNC: 98 MMOL/L
CO2 SERPL-SCNC: 31 MMOL/L
CREAT SERPL-MCNC: 1.43 MG/DL
DEPRECATED KAPPA LC FREE/LAMBDA SER: 1.99 RATIO
EGFR: 37 ML/MIN/1.73M2
EOSINOPHIL # BLD AUTO: 0.05 K/UL — SIGNIFICANT CHANGE UP (ref 0–0.5)
EOSINOPHIL NFR BLD AUTO: 0.7 % — SIGNIFICANT CHANGE UP (ref 0–6)
GLUCOSE SERPL-MCNC: 97 MG/DL
HCT VFR BLD CALC: 39 % — SIGNIFICANT CHANGE UP (ref 34.5–45)
HGB BLD-MCNC: 12 G/DL — SIGNIFICANT CHANGE UP (ref 11.5–15.5)
IGA SER QL IEP: 558 MG/DL
IGG SER QL IEP: 2322 MG/DL
IGM SER QL IEP: 188 MG/DL
IMM GRANULOCYTES NFR BLD AUTO: 0.4 % — SIGNIFICANT CHANGE UP (ref 0–0.9)
KAPPA LC CSF-MCNC: 6.52 MG/DL
KAPPA LC SERPL-MCNC: 12.97 MG/DL
LDH SERPL-CCNC: 193 U/L
LYMPHOCYTES # BLD AUTO: 1.55 K/UL — SIGNIFICANT CHANGE UP (ref 1–3.3)
LYMPHOCYTES # BLD AUTO: 23 % — SIGNIFICANT CHANGE UP (ref 13–44)
MCHC RBC-ENTMCNC: 28 PG — SIGNIFICANT CHANGE UP (ref 27–34)
MCHC RBC-ENTMCNC: 30.8 G/DL — LOW (ref 32–36)
MCV RBC AUTO: 90.9 FL — SIGNIFICANT CHANGE UP (ref 80–100)
MONOCYTES # BLD AUTO: 0.37 K/UL — SIGNIFICANT CHANGE UP (ref 0–0.9)
MONOCYTES NFR BLD AUTO: 5.5 % — SIGNIFICANT CHANGE UP (ref 2–14)
NEUTROPHILS # BLD AUTO: 4.68 K/UL — SIGNIFICANT CHANGE UP (ref 1.8–7.4)
NEUTROPHILS NFR BLD AUTO: 69.5 % — SIGNIFICANT CHANGE UP (ref 43–77)
NRBC # BLD: 0 /100 WBCS — SIGNIFICANT CHANGE UP (ref 0–0)
PLATELET # BLD AUTO: 520 K/UL — HIGH (ref 150–400)
POTASSIUM SERPL-SCNC: 4.3 MMOL/L
PROT SERPL-MCNC: 8.1 G/DL
RBC # BLD: 4.29 M/UL — SIGNIFICANT CHANGE UP (ref 3.8–5.2)
RBC # FLD: 15.7 % — HIGH (ref 10.3–14.5)
SODIUM SERPL-SCNC: 139 MMOL/L
WBC # BLD: 6.74 K/UL — SIGNIFICANT CHANGE UP (ref 3.8–10.5)
WBC # FLD AUTO: 6.74 K/UL — SIGNIFICANT CHANGE UP (ref 3.8–10.5)

## 2023-05-31 PROCEDURE — 99215 OFFICE O/P EST HI 40 MIN: CPT

## 2023-05-31 RX ORDER — MULTIVITAMIN
CAPSULE ORAL
Qty: 30 | Refills: 5 | Status: DISCONTINUED | COMMUNITY
Start: 2017-12-01 | End: 2023-05-31

## 2023-05-31 RX ORDER — CHLORHEXIDINE GLUCONATE 4 %
325 (65 FE) LIQUID (ML) TOPICAL DAILY
Qty: 100 | Refills: 1 | Status: ACTIVE | COMMUNITY
Start: 2023-05-31

## 2023-05-31 RX ORDER — METOPROLOL SUCCINATE 100 MG/1
100 TABLET, EXTENDED RELEASE ORAL DAILY
Refills: 0 | Status: ACTIVE | COMMUNITY
Start: 2023-05-31

## 2023-05-31 RX ORDER — LIDOCAINE 5% 700 MG/1
5 PATCH TOPICAL
Qty: 1 | Refills: 2 | Status: DISCONTINUED | COMMUNITY
Start: 2022-09-28 | End: 2023-05-31

## 2023-05-31 RX ORDER — CALCIUM CARBONATE/VITAMIN D3 600 MG-20
600-800 TABLET ORAL
Refills: 0 | Status: DISCONTINUED | COMMUNITY
Start: 2017-12-01 | End: 2023-05-31

## 2023-05-31 RX ORDER — AMIODARONE HYDROCHLORIDE 200 MG/1
200 TABLET ORAL DAILY
Refills: 0 | Status: ACTIVE | COMMUNITY
Start: 2023-05-31

## 2023-05-31 RX ORDER — CYCLOSPORINE 0.5 MG/ML
0.05 EMULSION OPHTHALMIC
Refills: 0 | Status: ACTIVE | COMMUNITY
Start: 2023-05-31

## 2023-05-31 RX ORDER — VALSARTAN 160 MG
160 CAPSULE ORAL
Refills: 0 | Status: DISCONTINUED | COMMUNITY
End: 2023-05-31

## 2023-05-31 RX ORDER — ZOLEDRONIC ACID 5 MG/100ML
5 INJECTION INTRAVENOUS
Refills: 0 | Status: DISCONTINUED | COMMUNITY
Start: 2022-12-07 | End: 2023-05-31

## 2023-05-31 RX ORDER — LEVOTHYROXINE SODIUM 25 UG/1
25 TABLET ORAL DAILY
Qty: 30 | Refills: 11 | Status: ACTIVE | COMMUNITY
Start: 2023-05-31

## 2023-05-31 NOTE — CONSULT LETTER
[Dear  ___] : Dear  [unfilled], [Courtesy Letter:] : I had the pleasure of seeing your patient, [unfilled], in my office today. [Please see my note below.] : Please see my note below. [Sincerely,] : Sincerely, [DrKyle  ___] : Dr. PALOMO [DrKyle ___] : Dr. PALOMO [FreeTextEntry2] : Bernice Chaudhary MD [FreeTextEntry3] : Octaviano Ching M.D., FACP\par Professor of Medicine\par [facility] [employer]\par Associate Chief, Division of Hematology\par [facility]\par [facility]\par [address] [place] [address]\[place] [address]\par [phone]\par \par \par \par

## 2023-05-31 NOTE — ADDENDUM
[FreeTextEntry1] : I, Gabi Marrufo, acted as a scribe on behalf of Dr. Octaviano Ching on 05/31/2023 .\par \par All medical entries made by the scribe were at my, Dr. Octaviano Ching , direction and personally dictated by me on 05/31/2023 .. I have reviewed the chart and agree that the record accurately reflects my personal performance of the history, physical exam, assessment and plan. I have also personally directed, reviewed, and agreed with the chart.\par

## 2023-05-31 NOTE — ASSESSMENT
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 She has a prior history of provoked DVT and is heterozygous for Factor V Leiden. She suffered a PE following fracture of her hip and a long airplane flight. This event was provoked. I do not believe that lifelong Eliquis anticoagulation is indicated and I am concerned that the risks of bleeding in this elderly woman would outweigh the benefit. Completed 6 months of Eliquis and then switched to ASA prophylaxis as before. 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 Hydroxyurea was begun recently. \par \par Plan:\par ASA 81 mg daily\par Eliquis - Stop when pt can ambulate\par D/C FeSO4\par Hydroxyurea 500 mg daily\par CMP, LDH, SPEP, Quant Ig, SFLC\par RTC 1 month\par

## 2023-05-31 NOTE — PHYSICAL EXAM
[Capable of only limited self care, confined to bed or chair more than 50% of waking hours] : Status 3- Capable of only limited self care, confined to bed or chair more than 50% of waking hours [Cachectic] : cachectic [Normal] : affect appropriate [de-identified] : RRR. S1S2 normal. Gr 2/6 KARISHMA LUSB to apex. ?S3 gallop

## 2023-05-31 NOTE — HISTORY OF PRESENT ILLNESS
[Disease:__________________________] : Disease: [unfilled] [de-identified] : Factor V Leiden heterozygote\par LLE DVT post-op\par 8/2019 PE after fracturing hip and a long flight; IVC filter placed and removed. 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 Underwent coronary artery bypass surgery x 2 on 12/28/22. Hospitalized for 45 days for a leaky valve. In the hospital,she had ? small strokes. When she was discharged, she fell, broke her right shoulder, and now is in rehab.  Appetite is back and she is eating. Had lost a large amount of weight. Reports weight gain. Has hip pain.  Chronic headaches resolved.   She notes no fever, night sweats, swollen glands, new visual problems, bleeding,  other skeletal pain, chest pain, shortness of breath, abdominal pain, leg pain/swelling, foot pain, pruritus. Had SOB, now resolved. Has slight bruising due to Eliquis. RA is controlled. \par

## 2023-05-31 NOTE — REVIEW OF SYSTEMS
[Fatigue] : fatigue [Dry Eyes] : dryness of the eyes [Joint Pain] : joint pain [Negative] : Heme/Lymph [Recent Change In Weight] : ~T recent weight change [FreeTextEntry1] : Raynaud's

## 2023-06-12 LAB
ALBUMIN MFR SERPL ELPH: 46.4 %
ALBUMIN SERPL-MCNC: 3.8 G/DL
ALBUMIN/GLOB SERPL: 0.9 RATIO
ALPHA1 GLOB MFR SERPL ELPH: 4.1 %
ALPHA1 GLOB SERPL ELPH-MCNC: 0.3 G/DL
ALPHA2 GLOB MFR SERPL ELPH: 8.7 %
ALPHA2 GLOB SERPL ELPH-MCNC: 0.7 G/DL
B-GLOBULIN MFR SERPL ELPH: 12.6 %
B-GLOBULIN SERPL ELPH-MCNC: 1 G/DL
GAMMA GLOB FLD ELPH-MCNC: 2.3 G/DL
GAMMA GLOB MFR SERPL ELPH: 28.2 %
INTERPRETATION SERPL IEP-IMP: NORMAL
M PROTEIN MFR SERPL ELPH: NORMAL
MONOCLON BAND OBS SERPL: NORMAL
PROT SERPL-MCNC: 8.1 G/DL
PROT SERPL-MCNC: 8.1 G/DL

## 2023-07-25 ENCOUNTER — OUTPATIENT (OUTPATIENT)
Dept: OUTPATIENT SERVICES | Facility: HOSPITAL | Age: 83
LOS: 1 days | Discharge: HOME | End: 2023-07-25

## 2023-07-25 DIAGNOSIS — Z98.890 OTHER SPECIFIED POSTPROCEDURAL STATES: Chronic | ICD-10-CM

## 2023-07-25 DIAGNOSIS — Z96.641 PRESENCE OF RIGHT ARTIFICIAL HIP JOINT: Chronic | ICD-10-CM

## 2023-07-25 DIAGNOSIS — Z95.828 PRESENCE OF OTHER VASCULAR IMPLANTS AND GRAFTS: Chronic | ICD-10-CM

## 2023-07-25 DIAGNOSIS — Z98.49 CATARACT EXTRACTION STATUS, UNSPECIFIED EYE: Chronic | ICD-10-CM

## 2023-07-25 DIAGNOSIS — Z96.642 PRESENCE OF LEFT ARTIFICIAL HIP JOINT: Chronic | ICD-10-CM

## 2023-07-25 DIAGNOSIS — D47.2 MONOCLONAL GAMMOPATHY: ICD-10-CM

## 2023-08-02 ENCOUNTER — RESULT REVIEW (OUTPATIENT)
Age: 83
End: 2023-08-02

## 2023-08-02 ENCOUNTER — LABORATORY RESULT (OUTPATIENT)
Age: 83
End: 2023-08-02

## 2023-08-02 ENCOUNTER — APPOINTMENT (OUTPATIENT)
Dept: HEMATOLOGY ONCOLOGY | Facility: CLINIC | Age: 83
End: 2023-08-02
Payer: MEDICARE

## 2023-08-02 VITALS
BODY MASS INDEX: 18.83 KG/M2 | RESPIRATION RATE: 19 BRPM | OXYGEN SATURATION: 96 % | WEIGHT: 99.65 LBS | TEMPERATURE: 97.2 F | DIASTOLIC BLOOD PRESSURE: 81 MMHG | SYSTOLIC BLOOD PRESSURE: 134 MMHG | HEART RATE: 58 BPM

## 2023-08-02 DIAGNOSIS — D47.2 MONOCLONAL GAMMOPATHY: ICD-10-CM

## 2023-08-02 LAB
BASOPHILS # BLD AUTO: 0.02 K/UL — SIGNIFICANT CHANGE UP (ref 0–0.2)
BASOPHILS NFR BLD AUTO: 0.3 % — SIGNIFICANT CHANGE UP (ref 0–2)
EOSINOPHIL # BLD AUTO: 0.01 K/UL — SIGNIFICANT CHANGE UP (ref 0–0.5)
EOSINOPHIL NFR BLD AUTO: 0.2 % — SIGNIFICANT CHANGE UP (ref 0–6)
HCT VFR BLD CALC: 35.2 % — SIGNIFICANT CHANGE UP (ref 34.5–45)
HGB BLD-MCNC: 11.3 G/DL — LOW (ref 11.5–15.5)
IMM GRANULOCYTES NFR BLD AUTO: 0.5 % — SIGNIFICANT CHANGE UP (ref 0–0.9)
LYMPHOCYTES # BLD AUTO: 1.01 K/UL — SIGNIFICANT CHANGE UP (ref 1–3.3)
LYMPHOCYTES # BLD AUTO: 15.3 % — SIGNIFICANT CHANGE UP (ref 13–44)
MCHC RBC-ENTMCNC: 31.2 PG — SIGNIFICANT CHANGE UP (ref 27–34)
MCHC RBC-ENTMCNC: 32.1 G/DL — SIGNIFICANT CHANGE UP (ref 32–36)
MCV RBC AUTO: 97.2 FL — SIGNIFICANT CHANGE UP (ref 80–100)
MONOCYTES # BLD AUTO: 0.36 K/UL — SIGNIFICANT CHANGE UP (ref 0–0.9)
MONOCYTES NFR BLD AUTO: 5.4 % — SIGNIFICANT CHANGE UP (ref 2–14)
NEUTROPHILS # BLD AUTO: 5.19 K/UL — SIGNIFICANT CHANGE UP (ref 1.8–7.4)
NEUTROPHILS NFR BLD AUTO: 78.3 % — HIGH (ref 43–77)
NRBC # BLD: 0 /100 WBCS — SIGNIFICANT CHANGE UP (ref 0–0)
PLATELET # BLD AUTO: 347 K/UL — SIGNIFICANT CHANGE UP (ref 150–400)
RBC # BLD: 3.62 M/UL — LOW (ref 3.8–5.2)
RBC # FLD: 23.6 % — HIGH (ref 10.3–14.5)
WBC # BLD: 6.62 K/UL — SIGNIFICANT CHANGE UP (ref 3.8–10.5)
WBC # FLD AUTO: 6.62 K/UL — SIGNIFICANT CHANGE UP (ref 3.8–10.5)

## 2023-08-02 PROCEDURE — 99214 OFFICE O/P EST MOD 30 MIN: CPT

## 2023-08-02 RX ORDER — APIXABAN 2.5 MG/1
2.5 TABLET, FILM COATED ORAL
Qty: 60 | Refills: 5 | Status: DISCONTINUED | COMMUNITY
Start: 2023-05-31 | End: 2023-08-02

## 2023-08-02 NOTE — REVIEW OF SYSTEMS
[Fatigue] : fatigue [Dry Eyes] : dryness of the eyes [Joint Pain] : joint pain [Negative] : Heme/Lymph [FreeTextEntry1] : Raynaud's

## 2023-08-02 NOTE — ADDENDUM
[FreeTextEntry1] : I, Mo Heredia, acted solely as a scribe for Dr. Octaviano Ching on 08/02/2023. All medical entries made by the Scribe were at my, Dr. Octaviano Ching's, direction and personally dictated by me on 08/02/2023. I have reviewed the chart and agree that the record accurately reflects my personal performance of the history, physical exam, assessment and plan. I have also personally directed, reviewed, and agreed with the chart.

## 2023-08-02 NOTE — REASON FOR VISIT
[Follow-Up Visit] : a follow-up visit for [Coagulopathy] : coagulopathy [Monoclonal Gammopathy] : monoclonal gammopathy [Myeloproliferative Disorder] : myeloproliferative disorder [Thrombocytosis] : thrombocytosis [Formal Caregiver] : formal caregiver

## 2023-08-02 NOTE — HISTORY OF PRESENT ILLNESS
[Disease:__________________________] : Disease: [unfilled] [de-identified] : Factor V Leiden heterozygote\par  LLE DVT post-op\par  8/2019 PE after fracturing hip and a long flight; IVC filter placed and removed. 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 She notes chronic lower back pain. Has chronic vertigo. She has mild headaches every other day. Does not make her nauseous nor affect her eyesight. Relieved with Tylenol. Weight stable. Has right sciatica. She notes no fever, night sweats, swollen glands, new visual problems, bleeding, chest pain, shortness of breath, abdominal pain, leg pain/swelling, foot pain, pruritus. Has senile purpura on arms.

## 2023-08-02 NOTE — CONSULT LETTER
[Dear  ___] : Dear  [unfilled], [Courtesy Letter:] : I had the pleasure of seeing your patient, [unfilled], in my office today. [Please see my note below.] : Please see my note below. [Sincerely,] : Sincerely, [DrKyle  ___] : Dr. PALOMO [DrKyle ___] : Dr. PALOMO [FreeTextEntry2] : Bernice Chaudhary MD [FreeTextEntry3] : Octaviano Ching M.D., FACP\par  Professor of Medicine\par  [facility] [employer]\par  Associate Chief, Division of Hematology\par  [facility]\par  [facility]\par  [address] [place] [address]\[place] [address]\par  [phone]\par  \par  \par  \par

## 2023-08-02 NOTE — ASSESSMENT
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 She has a prior history of provoked DVT and is heterozygous for Factor V Leiden. She suffered a PE following fracture of her hip and a long airplane flight. This event was provoked. I do not believe that lifelong Eliquis anticoagulation is indicated and I am concerned that the risks of bleeding in this elderly woman would outweigh the benefit. Completed 6 months of Eliquis and then switched to ASA prophylaxis as before. 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 Hydroxyurea was begun recently.   Plan: ASA 81 mg daily D/C FeSO4 Hydroxyurea 500 mg daily Folate CMP, LDH, SPEP, Quant Ig, SFLC RTC 6 weeks.

## 2023-08-02 NOTE — PHYSICAL EXAM
[Restricted in physically strenuous activity but ambulatory and able to carry out work of a light or sedentary nature] : Status 1- Restricted in physically strenuous activity but ambulatory and able to carry out work of a light or sedentary nature, e.g., light house work, office work [Cachectic] : cachectic [Normal] : affect appropriate [de-identified] : RRR. S1S2 normal. Gr 2/6 holosystolic murmur LUSB to apex. No gallops [de-identified] : Senile purpura on forearms

## 2023-08-02 NOTE — RESULTS/DATA
[FreeTextEntry1] :   WBC 6620 Hgb 11.3 Hct 35.2 Platelets 347,000 Diff 78P, 15L, 5M, 1 IMM  5/31/23 CMP BUN 32, Creatinine 1.43, ALK Phosphatase 133, eGFR 37  SPEP M-Raimundo unable to quantitate SPIEP One Weak IgM Lambda Band and One Weak IgG Lambda Band Identified IgG 2322, A 558, M 188 SFLC kappa 12.97, lambda 6.52, ratio 1.99

## 2023-08-03 LAB
ALBUMIN SERPL ELPH-MCNC: 4.1 G/DL
ALP BLD-CCNC: 152 U/L
ALT SERPL-CCNC: 41 U/L
ANION GAP SERPL CALC-SCNC: 12 MMOL/L
AST SERPL-CCNC: 31 U/L
BILIRUB SERPL-MCNC: 0.5 MG/DL
BUN SERPL-MCNC: 45 MG/DL
CALCIUM SERPL-MCNC: 9.7 MG/DL
CHLORIDE SERPL-SCNC: 100 MMOL/L
CO2 SERPL-SCNC: 22 MMOL/L
CREAT SERPL-MCNC: 1.29 MG/DL
DEPRECATED KAPPA LC FREE/LAMBDA SER: 1.92 RATIO
EGFR: 42 ML/MIN/1.73M2
GLUCOSE SERPL-MCNC: 101 MG/DL
IGA SER QL IEP: 499 MG/DL
IGG SER QL IEP: 2023 MG/DL
IGM SER QL IEP: 165 MG/DL
KAPPA LC CSF-MCNC: 5.79 MG/DL
KAPPA LC SERPL-MCNC: 11.09 MG/DL
LDH SERPL-CCNC: 212 U/L
POTASSIUM SERPL-SCNC: 5 MMOL/L
PROT SERPL-MCNC: 8.1 G/DL
SODIUM SERPL-SCNC: 134 MMOL/L

## 2023-08-07 LAB
ALBUMIN MFR SERPL ELPH: 47.7 %
ALBUMIN SERPL-MCNC: 3.9 G/DL
ALBUMIN/GLOB SERPL: 0.9 RATIO
ALPHA1 GLOB MFR SERPL ELPH: 4.8 %
ALPHA1 GLOB SERPL ELPH-MCNC: 0.4 G/DL
ALPHA2 GLOB MFR SERPL ELPH: 8.8 %
ALPHA2 GLOB SERPL ELPH-MCNC: 0.7 G/DL
B-GLOBULIN MFR SERPL ELPH: 12.9 %
B-GLOBULIN SERPL ELPH-MCNC: 1 G/DL
GAMMA GLOB FLD ELPH-MCNC: 2.1 G/DL
GAMMA GLOB MFR SERPL ELPH: 25.8 %
INTERPRETATION SERPL IEP-IMP: NORMAL
PROT SERPL-MCNC: 8.1 G/DL
PROT SERPL-MCNC: 8.1 G/DL

## 2023-09-14 ENCOUNTER — APPOINTMENT (OUTPATIENT)
Dept: HEMATOLOGY ONCOLOGY | Facility: CLINIC | Age: 83
End: 2023-09-14

## 2023-10-09 ENCOUNTER — OUTPATIENT (OUTPATIENT)
Dept: OUTPATIENT SERVICES | Facility: HOSPITAL | Age: 83
LOS: 1 days | Discharge: HOME | End: 2023-10-09

## 2023-10-09 DIAGNOSIS — Z96.641 PRESENCE OF RIGHT ARTIFICIAL HIP JOINT: Chronic | ICD-10-CM

## 2023-10-09 DIAGNOSIS — D47.2 MONOCLONAL GAMMOPATHY: ICD-10-CM

## 2023-10-09 DIAGNOSIS — Z96.642 PRESENCE OF LEFT ARTIFICIAL HIP JOINT: Chronic | ICD-10-CM

## 2023-10-09 DIAGNOSIS — Z98.890 OTHER SPECIFIED POSTPROCEDURAL STATES: Chronic | ICD-10-CM

## 2023-10-09 DIAGNOSIS — Z98.49 CATARACT EXTRACTION STATUS, UNSPECIFIED EYE: Chronic | ICD-10-CM

## 2023-10-09 DIAGNOSIS — Z95.828 PRESENCE OF OTHER VASCULAR IMPLANTS AND GRAFTS: Chronic | ICD-10-CM

## 2023-10-10 ENCOUNTER — RESULT REVIEW (OUTPATIENT)
Age: 83
End: 2023-10-10

## 2023-10-10 ENCOUNTER — APPOINTMENT (OUTPATIENT)
Dept: HEMATOLOGY ONCOLOGY | Facility: CLINIC | Age: 83
End: 2023-10-10
Payer: MEDICARE

## 2023-10-10 VITALS
HEART RATE: 58 BPM | TEMPERATURE: 97.1 F | OXYGEN SATURATION: 100 % | HEIGHT: 61.02 IN | RESPIRATION RATE: 16 BRPM | WEIGHT: 94.8 LBS | DIASTOLIC BLOOD PRESSURE: 77 MMHG | SYSTOLIC BLOOD PRESSURE: 138 MMHG | BODY MASS INDEX: 17.9 KG/M2

## 2023-10-10 LAB
BASOPHILS # BLD AUTO: 0.02 K/UL — SIGNIFICANT CHANGE UP (ref 0–0.2)
BASOPHILS NFR BLD AUTO: 0.4 % — SIGNIFICANT CHANGE UP (ref 0–2)
EOSINOPHIL # BLD AUTO: 0.02 K/UL — SIGNIFICANT CHANGE UP (ref 0–0.5)
EOSINOPHIL NFR BLD AUTO: 0.4 % — SIGNIFICANT CHANGE UP (ref 0–6)
HCT VFR BLD CALC: 34.6 % — SIGNIFICANT CHANGE UP (ref 34.5–45)
HGB BLD-MCNC: 11.3 G/DL — LOW (ref 11.5–15.5)
IMM GRANULOCYTES NFR BLD AUTO: 1.7 % — HIGH (ref 0–0.9)
LYMPHOCYTES # BLD AUTO: 1.03 K/UL — SIGNIFICANT CHANGE UP (ref 1–3.3)
LYMPHOCYTES # BLD AUTO: 21.4 % — SIGNIFICANT CHANGE UP (ref 13–44)
MCHC RBC-ENTMCNC: 32.7 G/DL — SIGNIFICANT CHANGE UP (ref 32–36)
MCHC RBC-ENTMCNC: 35 PG — HIGH (ref 27–34)
MCV RBC AUTO: 107.1 FL — HIGH (ref 80–100)
MONOCYTES # BLD AUTO: 0.25 K/UL — SIGNIFICANT CHANGE UP (ref 0–0.9)
MONOCYTES NFR BLD AUTO: 5.2 % — SIGNIFICANT CHANGE UP (ref 2–14)
NEUTROPHILS # BLD AUTO: 3.42 K/UL — SIGNIFICANT CHANGE UP (ref 1.8–7.4)
NEUTROPHILS NFR BLD AUTO: 70.9 % — SIGNIFICANT CHANGE UP (ref 43–77)
NRBC # BLD: 0 /100 WBCS — SIGNIFICANT CHANGE UP (ref 0–0)
PLATELET # BLD AUTO: 241 K/UL — SIGNIFICANT CHANGE UP (ref 150–400)
RBC # BLD: 3.23 M/UL — LOW (ref 3.8–5.2)
RBC # FLD: 16 % — HIGH (ref 10.3–14.5)
WBC # BLD: 4.82 K/UL — SIGNIFICANT CHANGE UP (ref 3.8–10.5)
WBC # FLD AUTO: 4.82 K/UL — SIGNIFICANT CHANGE UP (ref 3.8–10.5)

## 2023-10-10 PROCEDURE — 99213 OFFICE O/P EST LOW 20 MIN: CPT

## 2023-12-14 ENCOUNTER — OUTPATIENT (OUTPATIENT)
Dept: OUTPATIENT SERVICES | Facility: HOSPITAL | Age: 83
LOS: 1 days | Discharge: HOME | End: 2023-12-14

## 2023-12-14 DIAGNOSIS — Z98.890 OTHER SPECIFIED POSTPROCEDURAL STATES: Chronic | ICD-10-CM

## 2023-12-14 DIAGNOSIS — Z98.49 CATARACT EXTRACTION STATUS, UNSPECIFIED EYE: Chronic | ICD-10-CM

## 2023-12-14 DIAGNOSIS — Z96.641 PRESENCE OF RIGHT ARTIFICIAL HIP JOINT: Chronic | ICD-10-CM

## 2023-12-14 DIAGNOSIS — D47.2 MONOCLONAL GAMMOPATHY: ICD-10-CM

## 2023-12-14 DIAGNOSIS — Z96.642 PRESENCE OF LEFT ARTIFICIAL HIP JOINT: Chronic | ICD-10-CM

## 2023-12-14 DIAGNOSIS — Z95.828 PRESENCE OF OTHER VASCULAR IMPLANTS AND GRAFTS: Chronic | ICD-10-CM

## 2024-01-02 ENCOUNTER — RESULT REVIEW (OUTPATIENT)
Age: 84
End: 2024-01-02

## 2024-01-02 ENCOUNTER — LABORATORY RESULT (OUTPATIENT)
Age: 84
End: 2024-01-02

## 2024-01-02 ENCOUNTER — APPOINTMENT (OUTPATIENT)
Dept: HEMATOLOGY ONCOLOGY | Facility: CLINIC | Age: 84
End: 2024-01-02
Payer: MEDICARE

## 2024-01-02 VITALS
WEIGHT: 98.33 LBS | OXYGEN SATURATION: 97 % | SYSTOLIC BLOOD PRESSURE: 155 MMHG | TEMPERATURE: 97.2 F | RESPIRATION RATE: 16 BRPM | DIASTOLIC BLOOD PRESSURE: 77 MMHG | BODY MASS INDEX: 18.56 KG/M2 | HEART RATE: 55 BPM

## 2024-01-02 DIAGNOSIS — I82.402 ACUTE EMBOLISM AND THROMBOSIS OF UNSPECIFIED DEEP VEINS OF LEFT LOWER EXTREMITY: ICD-10-CM

## 2024-01-02 DIAGNOSIS — I48.91 UNSPECIFIED ATRIAL FIBRILLATION: ICD-10-CM

## 2024-01-02 DIAGNOSIS — D68.51 ACTIVATED PROTEIN C RESISTANCE: ICD-10-CM

## 2024-01-02 DIAGNOSIS — S72.002A FRACTURE OF UNSPECIFIED PART OF NECK OF LEFT FEMUR, INITIAL ENCOUNTER FOR CLOSED FRACTURE: ICD-10-CM

## 2024-01-02 DIAGNOSIS — D47.2 MONOCLONAL GAMMOPATHY: ICD-10-CM

## 2024-01-02 DIAGNOSIS — I26.99 OTHER PULMONARY EMBOLISM W/OUT ACUTE COR PULMONALE: ICD-10-CM

## 2024-01-02 DIAGNOSIS — D50.9 IRON DEFICIENCY ANEMIA, UNSPECIFIED: ICD-10-CM

## 2024-01-02 DIAGNOSIS — D47.3 ESSENTIAL (HEMORRHAGIC) THROMBOCYTHEMIA: ICD-10-CM

## 2024-01-02 LAB
BASOPHILS # BLD AUTO: 0.06 K/UL — SIGNIFICANT CHANGE UP (ref 0–0.2)
BASOPHILS # BLD AUTO: 0.06 K/UL — SIGNIFICANT CHANGE UP (ref 0–0.2)
BASOPHILS NFR BLD AUTO: 0.7 % — SIGNIFICANT CHANGE UP (ref 0–2)
BASOPHILS NFR BLD AUTO: 0.7 % — SIGNIFICANT CHANGE UP (ref 0–2)
EOSINOPHIL # BLD AUTO: 0.39 K/UL — SIGNIFICANT CHANGE UP (ref 0–0.5)
EOSINOPHIL # BLD AUTO: 0.39 K/UL — SIGNIFICANT CHANGE UP (ref 0–0.5)
EOSINOPHIL NFR BLD AUTO: 4.8 % — SIGNIFICANT CHANGE UP (ref 0–6)
EOSINOPHIL NFR BLD AUTO: 4.8 % — SIGNIFICANT CHANGE UP (ref 0–6)
HCT VFR BLD CALC: 33.4 % — LOW (ref 34.5–45)
HCT VFR BLD CALC: 33.4 % — LOW (ref 34.5–45)
HGB BLD-MCNC: 10.4 G/DL — LOW (ref 11.5–15.5)
HGB BLD-MCNC: 10.4 G/DL — LOW (ref 11.5–15.5)
IMM GRANULOCYTES NFR BLD AUTO: 0.5 % — SIGNIFICANT CHANGE UP (ref 0–0.9)
IMM GRANULOCYTES NFR BLD AUTO: 0.5 % — SIGNIFICANT CHANGE UP (ref 0–0.9)
LYMPHOCYTES # BLD AUTO: 1.24 K/UL — SIGNIFICANT CHANGE UP (ref 1–3.3)
LYMPHOCYTES # BLD AUTO: 1.24 K/UL — SIGNIFICANT CHANGE UP (ref 1–3.3)
LYMPHOCYTES # BLD AUTO: 15.1 % — SIGNIFICANT CHANGE UP (ref 13–44)
LYMPHOCYTES # BLD AUTO: 15.1 % — SIGNIFICANT CHANGE UP (ref 13–44)
MCHC RBC-ENTMCNC: 31.1 G/DL — LOW (ref 32–36)
MCHC RBC-ENTMCNC: 31.1 G/DL — LOW (ref 32–36)
MCHC RBC-ENTMCNC: 31.8 PG — SIGNIFICANT CHANGE UP (ref 27–34)
MCHC RBC-ENTMCNC: 31.8 PG — SIGNIFICANT CHANGE UP (ref 27–34)
MCV RBC AUTO: 102.1 FL — HIGH (ref 80–100)
MCV RBC AUTO: 102.1 FL — HIGH (ref 80–100)
MONOCYTES # BLD AUTO: 0.5 K/UL — SIGNIFICANT CHANGE UP (ref 0–0.9)
MONOCYTES # BLD AUTO: 0.5 K/UL — SIGNIFICANT CHANGE UP (ref 0–0.9)
MONOCYTES NFR BLD AUTO: 6.1 % — SIGNIFICANT CHANGE UP (ref 2–14)
MONOCYTES NFR BLD AUTO: 6.1 % — SIGNIFICANT CHANGE UP (ref 2–14)
NEUTROPHILS # BLD AUTO: 5.96 K/UL — SIGNIFICANT CHANGE UP (ref 1.8–7.4)
NEUTROPHILS # BLD AUTO: 5.96 K/UL — SIGNIFICANT CHANGE UP (ref 1.8–7.4)
NEUTROPHILS NFR BLD AUTO: 72.8 % — SIGNIFICANT CHANGE UP (ref 43–77)
NEUTROPHILS NFR BLD AUTO: 72.8 % — SIGNIFICANT CHANGE UP (ref 43–77)
NRBC # BLD: 0 /100 WBCS — SIGNIFICANT CHANGE UP (ref 0–0)
NRBC # BLD: 0 /100 WBCS — SIGNIFICANT CHANGE UP (ref 0–0)
PLATELET # BLD AUTO: 526 K/UL — HIGH (ref 150–400)
PLATELET # BLD AUTO: 526 K/UL — HIGH (ref 150–400)
RBC # BLD: 3.27 M/UL — LOW (ref 3.8–5.2)
RBC # BLD: 3.27 M/UL — LOW (ref 3.8–5.2)
RBC # FLD: 16.5 % — HIGH (ref 10.3–14.5)
RBC # FLD: 16.5 % — HIGH (ref 10.3–14.5)
WBC # BLD: 8.19 K/UL — SIGNIFICANT CHANGE UP (ref 3.8–10.5)
WBC # BLD: 8.19 K/UL — SIGNIFICANT CHANGE UP (ref 3.8–10.5)
WBC # FLD AUTO: 8.19 K/UL — SIGNIFICANT CHANGE UP (ref 3.8–10.5)
WBC # FLD AUTO: 8.19 K/UL — SIGNIFICANT CHANGE UP (ref 3.8–10.5)

## 2024-01-02 PROCEDURE — 99215 OFFICE O/P EST HI 40 MIN: CPT

## 2024-01-02 RX ORDER — APIXABAN 2.5 MG/1
2.5 TABLET, FILM COATED ORAL
Qty: 60 | Refills: 5 | Status: ACTIVE | COMMUNITY
Start: 2024-01-02

## 2024-01-02 RX ORDER — ASPIRIN 81 MG/1
81 TABLET, CHEWABLE ORAL DAILY
Refills: 0 | Status: DISCONTINUED | COMMUNITY
Start: 2019-12-27 | End: 2024-01-02

## 2024-01-02 RX ORDER — ATORVASTATIN CALCIUM 40 MG/1
40 TABLET, FILM COATED ORAL
Refills: 0 | Status: DISCONTINUED | COMMUNITY
Start: 2023-05-31 | End: 2024-01-02

## 2024-01-02 RX ORDER — SILVER SULFADIAZINE 10 MG/G
1 CREAM TOPICAL DAILY
Refills: 0 | Status: ACTIVE | COMMUNITY
Start: 2024-01-02

## 2024-01-02 RX ORDER — AMLODIPINE BESYLATE 5 MG/1
5 TABLET ORAL DAILY
Refills: 0 | Status: DISCONTINUED | COMMUNITY
Start: 2018-06-08 | End: 2024-01-02

## 2024-01-02 RX ORDER — FUROSEMIDE 40 MG/1
40 TABLET ORAL DAILY
Refills: 0 | Status: ACTIVE | COMMUNITY
Start: 2023-05-31

## 2024-01-02 RX ORDER — FOLIC ACID 1 MG/1
1 TABLET ORAL
Qty: 30 | Refills: 11 | Status: DISCONTINUED | COMMUNITY
Start: 2023-05-31 | End: 2024-01-02

## 2024-01-02 RX ORDER — DOCUSATE SODIUM 100 MG/1
100 CAPSULE, LIQUID FILLED ORAL TWICE DAILY
Refills: 0 | Status: ACTIVE | COMMUNITY
Start: 2024-01-02

## 2024-01-02 RX ORDER — SENNOSIDES 8.6 MG/1
8.6 CAPSULE, GELATIN COATED ORAL
Refills: 0 | Status: ACTIVE | COMMUNITY
Start: 2024-01-02

## 2024-01-02 RX ORDER — ERGOCALCIFEROL 1.25 MG/1
1.25 MG CAPSULE ORAL
Refills: 0 | Status: DISCONTINUED | COMMUNITY
Start: 2023-05-31 | End: 2024-01-02

## 2024-01-02 RX ORDER — FAMOTIDINE 20 MG/1
20 TABLET, FILM COATED ORAL DAILY
Refills: 0 | Status: DISCONTINUED | COMMUNITY
Start: 2023-05-31 | End: 2024-01-02

## 2024-01-02 RX ORDER — HYDROXYUREA 500 MG/1
500 CAPSULE ORAL DAILY
Qty: 30 | Refills: 5 | Status: DISCONTINUED | COMMUNITY
Start: 2023-05-31 | End: 2024-01-02

## 2024-01-02 RX ORDER — GABAPENTIN 100 MG/1
100 CAPSULE ORAL 3 TIMES DAILY
Refills: 0 | Status: ACTIVE | COMMUNITY
Start: 2024-01-02

## 2024-01-02 NOTE — HISTORY OF PRESENT ILLNESS
[Disease:__________________________] : Disease: [unfilled] [de-identified] : Factor V Leiden heterozygote\par  LLE DVT post-op\par  8/2019 PE after fracturing hip and a long flight; IVC filter placed and removed. 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 Had left hip replacement surgery, Had episodes of atrial fibrillation after open heart surgery. Was prescribed Eliquis for it by Cardiology.  Feels better. Ambulates with a walker. Has a sore on her lower back which is now improving. Intermittent vertigo persists. Mild headaches and chronic lower back pain have improved. Notes vision disturbances while reading. Right sciatica is improved.  Reports left leg tends to swell since could not wear support hose, but no pain. She notes no fever, night sweats, swollen glands, other visual problems, bleeding, chest pain, shortness of breath, abdominal pain, leg pain, foot pain, pruritus.  Bruises easily. Weight is stable. Had COVID booster, but is not sure if she had the flu shot.

## 2024-01-02 NOTE — RESULTS/DATA
[FreeTextEntry1] : WBC 8190 Hgb 10.4  Hct 33.4 .1 Platelets 526,000 Diff 73P, 15L, 6M, 1 IMM 5Eo 1Ba ANC 5960  8/02/23 CMP sodium 134 Glu 101 BUN 45 ALK phos 152 eGFR 42  SPEP polyclonal gammopathy SPIEP no monoclonal band identified. IgG 2023, A 499, M 165 SFLC kappa 11.09, lambda 5.79, ratio 1.92

## 2024-01-02 NOTE — PHYSICAL EXAM
[Ambulatory and capable of all self care but unable to carry out any work activities] : Status 2- Ambulatory and capable of all self care but unable to carry out any work activities. Up and about more than 50% of waking hours [Cachectic] : cachectic [Normal] : affect appropriate [de-identified] : RRR. S1S2 normal. Gr 2/6 holosystolic murmur LUSB to axilla. No gallops [de-identified] : 4+ pretibial edema LLE to knee. No cords, tenderness. [de-identified] : Resolving bruise on right breast [de-identified] : Kyphosis

## 2024-01-02 NOTE — CONSULT LETTER
[Dear  ___] : Dear  [unfilled], [Courtesy Letter:] : I had the pleasure of seeing your patient, [unfilled], in my office today. [Please see my note below.] : Please see my note below. [Sincerely,] : Sincerely, [DrKyle  ___] : Dr. PALOMO [DrKyle ___] : Dr. PALOMO [FreeTextEntry2] : Bernice Chaudhary MD [FreeTextEntry3] : Octaviano Ching M.D., FACP\par  Professor of Medicine\par  [facility] [employer]\par  Associate Chief, Division of Hematology\par  [facility]\par  [facility]\par  [address] [place] [address]\[place] [address]\par  [phone]\par  \par  \par  \par

## 2024-01-02 NOTE — ASSESSMENT
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 She has a prior history of provoked DVT and is heterozygous for Factor V Leiden. She suffered a PE following fracture of her hip and a long airplane flight. This event was provoked. 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 Hydroxyurea was begun.  Plan: ASA 81 mg daily - hold while on Apixaban unless Cardiology needs it FeSO4 Continue to hold Hydroxyurea 500 mg daily CMP, LDH, SPEP, Quant Ig, SFLC RSV vaccine as recommended.  LLE support hose Monthly CBC RTC 3 months

## 2024-01-02 NOTE — ADDENDUM
[FreeTextEntry1] : , I Jona Arellano, acted solely as a scribe for Dr. Octaviano Ching on 1/02/2024. All medical entries made by the Scribe were at my, Dr. Octaviano Ching's, direction and personally dictated by me on 1/02/2024. I have reviewed the chart and agree that the record accurately reflects my personal performance of the history, physical exam, assessment and plan. I have also personally directed, reviewed, and agreed with the chart.

## 2024-01-03 LAB
ALBUMIN SERPL ELPH-MCNC: 3.8 G/DL
ALP BLD-CCNC: 146 U/L
ALT SERPL-CCNC: 9 U/L
ANION GAP SERPL CALC-SCNC: 9 MMOL/L
AST SERPL-CCNC: 12 U/L
BILIRUB SERPL-MCNC: 0.4 MG/DL
BUN SERPL-MCNC: 31 MG/DL
CALCIUM SERPL-MCNC: 9.4 MG/DL
CHLORIDE SERPL-SCNC: 96 MMOL/L
CO2 SERPL-SCNC: 28 MMOL/L
CREAT SERPL-MCNC: 1.13 MG/DL
DEPRECATED KAPPA LC FREE/LAMBDA SER: 1.64 RATIO
EGFR: 49 ML/MIN/1.73M2
GLUCOSE SERPL-MCNC: 93 MG/DL
IGA SER QL IEP: 573 MG/DL
IGG SER QL IEP: 2167 MG/DL
IGM SER QL IEP: 187 MG/DL
KAPPA LC CSF-MCNC: 6.66 MG/DL
KAPPA LC SERPL-MCNC: 10.95 MG/DL
LDH SERPL-CCNC: 190 U/L
POTASSIUM SERPL-SCNC: 4.4 MMOL/L
PROT SERPL-MCNC: 7.4 G/DL
SODIUM SERPL-SCNC: 133 MMOL/L

## 2024-01-11 LAB
ALBUMIN MFR SERPL ELPH: 47.7 %
ALBUMIN MFR SERPL ELPH: NORMAL %
ALBUMIN SERPL-MCNC: 3.5 G/DL
ALBUMIN SERPL-MCNC: NORMAL G/DL
ALBUMIN/GLOB SERPL: 0.9 RATIO
ALPHA1 GLOB MFR SERPL ELPH: 4.2 %
ALPHA1 GLOB MFR SERPL ELPH: NORMAL %
ALPHA1 GLOB SERPL ELPH-MCNC: 0.3 G/DL
ALPHA1 GLOB SERPL ELPH-MCNC: NORMAL G/DL
ALPHA2 GLOB MFR SERPL ELPH: 8 %
ALPHA2 GLOB MFR SERPL ELPH: NORMAL %
ALPHA2 GLOB SERPL ELPH-MCNC: 0.6 G/DL
ALPHA2 GLOB SERPL ELPH-MCNC: NORMAL G/DL
B-GLOBULIN MFR SERPL ELPH: 11.4 %
B-GLOBULIN MFR SERPL ELPH: NORMAL %
B-GLOBULIN SERPL ELPH-MCNC: 0.8 G/DL
B-GLOBULIN SERPL ELPH-MCNC: NORMAL G/DL
DEPRECATED KAPPA LC FREE/LAMBDA SER: 1.77 RATIO
GAMMA GLOB FLD ELPH-MCNC: 2.1 G/DL
GAMMA GLOB FLD ELPH-MCNC: NORMAL G/DL
GAMMA GLOB MFR SERPL ELPH: 28.7 %
GAMMA GLOB MFR SERPL ELPH: NORMAL %
IGA SER QL IEP: 597 MG/DL
IGG SER QL IEP: 2366 MG/DL
IGM SER QL IEP: 187 MG/DL
INTERPRETATION SERPL IEP-IMP: NORMAL
INTERPRETATION SERPL IEP-IMP: NORMAL
KAPPA LC CSF-MCNC: 6.55 MG/DL
KAPPA LC SERPL-MCNC: 11.61 MG/DL
M PROTEIN SPEC IFE-MCNC: NORMAL
PROT SERPL-MCNC: 7.4 G/DL
PROT SERPL-MCNC: 7.4 G/DL
PROT SERPL-MCNC: 8 G/DL
PROT SERPL-MCNC: NORMAL G/DL

## 2024-03-20 ENCOUNTER — OUTPATIENT (OUTPATIENT)
Dept: OUTPATIENT SERVICES | Facility: HOSPITAL | Age: 84
LOS: 1 days | Discharge: HOME | End: 2024-03-20

## 2024-03-20 DIAGNOSIS — Z98.890 OTHER SPECIFIED POSTPROCEDURAL STATES: Chronic | ICD-10-CM

## 2024-03-20 DIAGNOSIS — D47.2 MONOCLONAL GAMMOPATHY: ICD-10-CM

## 2024-03-20 DIAGNOSIS — Z98.49 CATARACT EXTRACTION STATUS, UNSPECIFIED EYE: Chronic | ICD-10-CM

## 2024-03-20 DIAGNOSIS — Z96.641 PRESENCE OF RIGHT ARTIFICIAL HIP JOINT: Chronic | ICD-10-CM

## 2024-03-20 DIAGNOSIS — Z95.828 PRESENCE OF OTHER VASCULAR IMPLANTS AND GRAFTS: Chronic | ICD-10-CM

## 2024-03-20 DIAGNOSIS — Z96.642 PRESENCE OF LEFT ARTIFICIAL HIP JOINT: Chronic | ICD-10-CM

## 2024-04-02 ENCOUNTER — APPOINTMENT (OUTPATIENT)
Dept: HEMATOLOGY ONCOLOGY | Facility: CLINIC | Age: 84
End: 2024-04-02

## 2024-04-30 NOTE — PROGRESS NOTE ADULT - ASSESSMENT
Neurosurgery Clinic Visit  2024    Nacho Valel PCP:  Dagoberto Bell MD    1947 MRN [id number]     HISTORY OF PRESENT ILLNESS:  Nacho Valle is a(n) 77 year old male who presents to the office for evaluation today.  He saw Nohemi last month.    He is accompanied by his wife.  He complains of spine pain from top to bottom.  He has also had some rib pain.  Symptoms been ongoing for about 7 years, when his multiple myeloma was diagnosed.  The patient reports that he saw chiropractor at the time, who fractured 6 ribs, but his wife contest this.    The pain is constant.  Overall it is the same, or possibly bit worse.  No exacerbating factors.  He gets some relief when he gets steroids with chemotherapy.  He is currently attending physical therapy, he reports for leg strengthening.  No pain management up to this point.    Medical history is significant for hypertension, diabetes, and multiple myeloma, which is in remission.      PHYSICAL EXAMINATION:  Vital Signs:  /70 (BP Location: Left arm, Patient Position: Sitting, Cuff Size: large)   Pulse 70   Ht 72.01\"   Wt 247 lb (112 kg)   BMI 33.49 kg/m²   On examination, strength is 5/5 throughout.  Reflexes are 1+ and symmetric.  No Kirill's or clonus.    The patient stands with increased pelvic tilt due to a thoracic hyperkyphosis.  This results in him having to keep his knees slightly bent when he stands.      REVIEW OF STUDIES:    MRI scans of the spinal axis were performed.  Images personally reviewed.    Cervical MRI demonstrates multilevel degenerative disc disease without spinal cord compression.    Thoracic MRI demonstrates a chronic T6 compression fracture.  This causes some effacement of the ventral subarachnoid space but no cord compression.    Lumbar MRI demonstrates multilevel degenerative changes, without high-grade central stenosis.    Scoliosis x-rays reviewed:    Mild thoracic scoliosis.  CS VL 0  SVA 0  Lumbar lordosis 56  Pelvic  incidence 60  Sacral slope 30  Pelvic tilt 30  Notable thoracic hyperkyphosis      ASSESSMENT and PLAN:  The patient is suffering from generalized pain throughout his spine.  I think a significant component of this may be postural.  He does have a thoracic hyperkyphosis, which is exacerbated by the T6 compression deformity.  However, I do not think he would benefit from surgical intervention.    We discussed the importance of focusing on functional recovery and improvement.  I have made a referral to physiatry.  I have also entered a new order for physical therapy.  This should include work on posture training, generalized conditioning, and spinal range of motion and strengthening.    I reviewed the imaging with the patient and his wife in detail, and explained my findings and recommendations.  All questions have been answered.    I would be happy to see him back on an as-needed basis, if any additional questions arise, or there is a change in his clinical condition.            Time spent on counseling/coordination of care:  30 Minutes    Total time spent with patient:  15 Minutes        4/30/2024  4:13 PM     This report was dictated using voice recognition technology.  Errors in syntax or recognition may occur, and should not be construed to change the meaning of a sentence.    The patient  went to the operating room  for surgical repair of her hip-fracture August 2, 2019 and the procedure  was  aborted.  Prior to induction on cardiac monitoring, she was found to have a 6 beat run of wide-complex tachycardia.  She also had frequent VPCs.  The patient was seen by cardiology that evening, and cardiac enzymes were obtained and negative.  The patient was sent for a Doppler study of the leg which showed no evidence for DVT bilaterally.  She then had a CT angiogram of the lungs which shows positive pulmonary emboli in the right upper and right lower lobes.  It is likely that the thrombus traveled to the lung, with movement prior to surgery and caused hypoxemia with sudden onset of cardiac arrhythmia.  Cardiology to advise if  an arrhythmic agent should be started.  The patient continues on telemetry.  She has had no further beats of ventricular tachycardia.  The patient still requires surgical intervention for her fractured hip and is now receiving full dose IV heparin. The patient is rescheduled for ORIF 8/4/19 and is scheduled to have a temporary inferior vena cava filter placed before the surgical procedure. The patient  went to the operating room  for surgical repair of her hip-fracture August 2, 2019 and the procedure  was  aborted.  Prior to induction on cardiac monitoring, she was found to have a 6 beat run of wide-complex tachycardia.  She also had frequent VPCs.  The patient was seen by cardiology that evening, and cardiac enzymes were obtained and negative.  The patient was sent for a Doppler study of the leg which showed no evidence for DVT bilaterally.  She then had a CT angiogram of the lungs which shows positive pulmonary emboli in the right upper and right lower lobes.  It is likely that the thrombus traveled to the lung, with movement prior to surgery and caused hypoxemia with sudden onset of cardiac arrhythmia.  Cardiology to advise if  an arrhythmic agent should be started.  The patient continues on telemetry.  She has had no further beats of ventricular tachycardia.  The patient still requires surgical intervention for her fractured hip and is now receiving full dose IV heparin. The patient is rescheduled for Right hip ORIF 8/4/19 and is scheduled to have a temporary inferior vena cava filter placed before the surgical procedure. The patient  went to the operating room  for surgical repair of her hip-fracture August 2, 2019 and the procedure  was  aborted.  Prior to induction on cardiac monitoring, she was found to have a 6 beat run of wide-complex tachycardia.  She also had frequent VPCs.  The patient was seen by cardiology that evening, and cardiac enzymes were obtained and negative.  The patient was sent for a Doppler study of the leg which showed no evidence for DVT bilaterally.  She then had a CT angiogram of the lungs which shows positive pulmonary emboli in the right upper and right lower lobes.  It is likely that the thrombus traveled to the lung, with movement prior to surgery and caused hypoxemia with sudden onset of cardiac arrhythmia.  Cardiology findings no other active problems or ischemia.  No additional testing or cardiac treatments to be rendered at this time and the patient has received cardiology clearance for surgery. The patient continues on telemetry.  She has had no further beats of ventricular tachycardia.  The patient still requires surgical intervention for her fractured hip and is now receiving full dose IV heparin, with no fall in hematocrit. The patient is  scheduled to have a temporary inferior vena cava filter placed 08/04/19 before the surgical procedure and is receiving a steroid prep, because of an iodine dye allergy, prior to the procedure. Right hip ORIF has been delayed until filter is placed and surgery rescheduled electively for 8/6/19.

## 2024-05-07 NOTE — ED ADULT NURSE NOTE - NS ED NOTE  TALK SOMEONE YN
Left Voicemail (1st Attempt) for the patient to call back and schedule the following:    Appointment type: rtn hf  Provider: maria t  Return date: 07/23/24  Specialty phone number: [phone] opt 1  Additional appointment(s) needed: labs  Additonal Notes: n/a    No

## 2024-08-15 ENCOUNTER — OUTPATIENT (OUTPATIENT)
Dept: OUTPATIENT SERVICES | Facility: HOSPITAL | Age: 84
LOS: 1 days | Discharge: HOME | End: 2024-08-15

## 2024-08-15 DIAGNOSIS — D47.2 MONOCLONAL GAMMOPATHY: ICD-10-CM

## 2024-08-15 DIAGNOSIS — Z95.828 PRESENCE OF OTHER VASCULAR IMPLANTS AND GRAFTS: Chronic | ICD-10-CM

## 2024-08-15 DIAGNOSIS — Z98.49 CATARACT EXTRACTION STATUS, UNSPECIFIED EYE: Chronic | ICD-10-CM

## 2024-08-15 DIAGNOSIS — Z98.890 OTHER SPECIFIED POSTPROCEDURAL STATES: Chronic | ICD-10-CM

## 2024-08-15 DIAGNOSIS — Z96.641 PRESENCE OF RIGHT ARTIFICIAL HIP JOINT: Chronic | ICD-10-CM

## 2024-08-15 DIAGNOSIS — Z96.642 PRESENCE OF LEFT ARTIFICIAL HIP JOINT: Chronic | ICD-10-CM

## 2024-08-20 ENCOUNTER — RESULT REVIEW (OUTPATIENT)
Age: 84
End: 2024-08-20

## 2024-08-20 ENCOUNTER — APPOINTMENT (OUTPATIENT)
Dept: HEMATOLOGY ONCOLOGY | Facility: CLINIC | Age: 84
End: 2024-08-20

## 2024-08-20 VITALS
HEIGHT: 61.02 IN | WEIGHT: 81.57 LBS | RESPIRATION RATE: 16 BRPM | DIASTOLIC BLOOD PRESSURE: 94 MMHG | OXYGEN SATURATION: 95 % | TEMPERATURE: 97.7 F | BODY MASS INDEX: 15.4 KG/M2 | HEART RATE: 80 BPM | SYSTOLIC BLOOD PRESSURE: 132 MMHG

## 2024-08-20 DIAGNOSIS — D47.2 MONOCLONAL GAMMOPATHY: ICD-10-CM

## 2024-08-20 LAB
ALBUMIN SERPL ELPH-MCNC: 3.9 G/DL
ALP BLD-CCNC: 135 U/L
ALT SERPL-CCNC: 18 U/L
ANION GAP SERPL CALC-SCNC: 12 MMOL/L
AST SERPL-CCNC: 21 U/L
BASOPHILS # BLD AUTO: 0.05 K/UL — SIGNIFICANT CHANGE UP (ref 0–0.2)
BASOPHILS NFR BLD AUTO: 0.4 % — SIGNIFICANT CHANGE UP (ref 0–2)
BILIRUB SERPL-MCNC: 0.5 MG/DL
BUN SERPL-MCNC: 39 MG/DL
CALCIUM SERPL-MCNC: 9.7 MG/DL
CHLORIDE SERPL-SCNC: 99 MMOL/L
CO2 SERPL-SCNC: 24 MMOL/L
CREAT SERPL-MCNC: 1.18 MG/DL
EGFR: 46 ML/MIN/1.73M2
EOSINOPHIL # BLD AUTO: 0.1 K/UL — SIGNIFICANT CHANGE UP (ref 0–0.5)
EOSINOPHIL NFR BLD AUTO: 0.8 % — SIGNIFICANT CHANGE UP (ref 0–6)
GLUCOSE SERPL-MCNC: 85 MG/DL
HCT VFR BLD CALC: 41.3 % — SIGNIFICANT CHANGE UP (ref 34.5–45)
HGB BLD-MCNC: 13.1 G/DL — SIGNIFICANT CHANGE UP (ref 11.5–15.5)
IMM GRANULOCYTES NFR BLD AUTO: 0.6 % — SIGNIFICANT CHANGE UP (ref 0–0.9)
LDH SERPL-CCNC: 235 U/L
LYMPHOCYTES # BLD AUTO: 1.46 K/UL — SIGNIFICANT CHANGE UP (ref 1–3.3)
LYMPHOCYTES # BLD AUTO: 11.3 % — LOW (ref 13–44)
MCHC RBC-ENTMCNC: 28 PG — SIGNIFICANT CHANGE UP (ref 27–34)
MCHC RBC-ENTMCNC: 31.7 G/DL — LOW (ref 32–36)
MCV RBC AUTO: 88.2 FL — SIGNIFICANT CHANGE UP (ref 80–100)
MONOCYTES # BLD AUTO: 0.73 K/UL — SIGNIFICANT CHANGE UP (ref 0–0.9)
MONOCYTES NFR BLD AUTO: 5.7 % — SIGNIFICANT CHANGE UP (ref 2–14)
NEUTROPHILS # BLD AUTO: 10.5 K/UL — HIGH (ref 1.8–7.4)
NEUTROPHILS NFR BLD AUTO: 81.2 % — HIGH (ref 43–77)
NRBC # BLD: 0 /100 WBCS — SIGNIFICANT CHANGE UP (ref 0–0)
PLATELET # BLD AUTO: 650 K/UL — HIGH (ref 150–400)
POTASSIUM SERPL-SCNC: 4.9 MMOL/L
PROT SERPL-MCNC: 7.3 G/DL
RBC # BLD: 4.68 M/UL — SIGNIFICANT CHANGE UP (ref 3.8–5.2)
RBC # FLD: 15.9 % — HIGH (ref 10.3–14.5)
SODIUM SERPL-SCNC: 135 MMOL/L
WBC # BLD: 12.92 K/UL — HIGH (ref 3.8–10.5)
WBC # FLD AUTO: 12.92 K/UL — HIGH (ref 3.8–10.5)

## 2024-08-20 PROCEDURE — 99213 OFFICE O/P EST LOW 20 MIN: CPT

## 2024-08-20 RX ORDER — CLOPIDOGREL 75 MG/1
75 TABLET, FILM COATED ORAL
Refills: 0 | Status: ACTIVE | COMMUNITY
Start: 2024-08-20

## 2024-08-20 RX ORDER — EMPAGLIFLOZIN 10 MG/1
10 TABLET, FILM COATED ORAL
Refills: 0 | Status: ACTIVE | COMMUNITY
Start: 2024-08-20

## 2024-08-20 RX ORDER — ATORVASTATIN CALCIUM 40 MG/1
40 TABLET, FILM COATED ORAL DAILY
Refills: 0 | Status: ACTIVE | COMMUNITY
Start: 2024-08-20

## 2024-08-20 NOTE — HISTORY OF PRESENT ILLNESS
[Disease:__________________________] : Disease: [unfilled] [de-identified] : Factor V Leiden heterozygote\par  LLE DVT post-op\par  8/2019 PE after fracturing hip and a long flight; IVC filter placed and removed. 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 Had left hip replacement surgery, Had episodes of atrial fibrillation after open heart surgery. Was prescribed Eliquis for it by Cardiology.  Feels better. Ambulates with a walker. Has a sore on her lower back which is now improving. Intermittent vertigo persists. Mild headaches and chronic lower back pain have improved. Notes vision disturbances while reading. Right sciatica is improved.  Reports left leg tends to swell since could not wear support hose, but no pain. She notes no fever, night sweats, swollen glands, other visual problems, bleeding, chest pain, shortness of breath, abdominal pain, leg pain, foot pain, pruritus.  Bruises easily. Weight is stable. Had COVID booster, but is not sure if she had the flu shot.  Was hospitalized and had 2 MI's and pneumonia,  still feels tired and weak.

## 2024-08-20 NOTE — PHYSICAL EXAM
[Ambulatory and capable of all self care but unable to carry out any work activities] : Status 2- Ambulatory and capable of all self care but unable to carry out any work activities. Up and about more than 50% of waking hours [Cachectic] : cachectic [Normal] : affect appropriate [de-identified] : RRR. S1S2 normal. Gr 2/6 holosystolic murmur LUSB to axilla. No gallops [de-identified] : 4+ pretibial edema LLE to knee. No cords, tenderness. [de-identified] : Resolving bruise on right breast [de-identified] : Kyphosis

## 2024-08-20 NOTE — CONSULT LETTER
[Dear  ___] : Dear  [unfilled], [Courtesy Letter:] : I had the pleasure of seeing your patient, [unfilled], in my office today. [Please see my note below.] : Please see my note below. [Sincerely,] : Sincerely, [FreeTextEntry2] : Bernice Chaudhary MD [FreeTextEntry3] : Octaviano Ching M.D., FACP\par  Professor of Medicine\par  [facility] [employer]\par  Associate Chief, Division of Hematology\par  [facility]\par  [facility]\par  [address] [place] [address]\[place] [address]\par  [phone]\par  \par  \par  \par    [DrKyle  ___] : Dr. PALOMO [DrKyle ___] : Dr. PALOMO

## 2024-08-20 NOTE — ASSESSMENT
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 She has a prior history of provoked DVT and is heterozygous for Factor V Leiden. She suffered a PE following fracture of her hip and a long airplane flight. This event was provoked. 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 Hydroxyurea was begun.  Plan: ASA 81 mg daily - hold while on Apixaban unless Cardiology needs it FeSO4 Continue to hold Hydroxyurea 500 mg daily CMP, LDH, SPEP, Quant Ig, SFLC RSV vaccine as recommended.  LLE support hose Monthly CBC RTC 3 months    [Palliative Care Plan] : not applicable at this time

## 2024-08-21 LAB
DEPRECATED KAPPA LC FREE/LAMBDA SER: 1.54 RATIO
IGA SER QL IEP: 471 MG/DL
IGG SER QL IEP: 1896 MG/DL
IGM SER QL IEP: 199 MG/DL
KAPPA LC CSF-MCNC: 5.98 MG/DL
KAPPA LC SERPL-MCNC: 9.18 MG/DL

## 2024-08-27 LAB
ALBUMIN MFR SERPL ELPH: 47.7 %
ALBUMIN SERPL-MCNC: 3.6 G/DL
ALBUMIN/GLOB SERPL: 0.9 RATIO
ALPHA1 GLOB MFR SERPL ELPH: 4.2 %
ALPHA1 GLOB SERPL ELPH-MCNC: 0.3 G/DL
ALPHA2 GLOB MFR SERPL ELPH: 9.3 %
ALPHA2 GLOB SERPL ELPH-MCNC: 0.7 G/DL
B-GLOBULIN MFR SERPL ELPH: 12.8 %
B-GLOBULIN SERPL ELPH-MCNC: 1 G/DL
GAMMA GLOB FLD ELPH-MCNC: 2 G/DL
GAMMA GLOB MFR SERPL ELPH: 26 %
INTERPRETATION SERPL IEP-IMP: NORMAL
M PROTEIN MFR SERPL ELPH: NORMAL
M PROTEIN SPEC IFE-MCNC: NORMAL
MONOCLON BAND OBS SERPL: NORMAL
PROT SERPL-MCNC: 7.5 G/DL
PROT SERPL-MCNC: 7.5 G/DL

## 2024-11-25 ENCOUNTER — OUTPATIENT (OUTPATIENT)
Dept: OUTPATIENT SERVICES | Facility: HOSPITAL | Age: 84
LOS: 1 days | Discharge: HOME | End: 2024-11-25

## 2024-11-25 DIAGNOSIS — Z98.49 CATARACT EXTRACTION STATUS, UNSPECIFIED EYE: Chronic | ICD-10-CM

## 2024-11-25 DIAGNOSIS — Z98.890 OTHER SPECIFIED POSTPROCEDURAL STATES: Chronic | ICD-10-CM

## 2024-11-25 DIAGNOSIS — D47.2 MONOCLONAL GAMMOPATHY: ICD-10-CM

## 2024-11-25 DIAGNOSIS — Z96.641 PRESENCE OF RIGHT ARTIFICIAL HIP JOINT: Chronic | ICD-10-CM

## 2024-11-25 DIAGNOSIS — Z96.642 PRESENCE OF LEFT ARTIFICIAL HIP JOINT: Chronic | ICD-10-CM

## 2024-11-25 DIAGNOSIS — Z95.828 PRESENCE OF OTHER VASCULAR IMPLANTS AND GRAFTS: Chronic | ICD-10-CM

## 2024-11-27 ENCOUNTER — RESULT REVIEW (OUTPATIENT)
Age: 84
End: 2024-11-27

## 2024-11-27 ENCOUNTER — APPOINTMENT (OUTPATIENT)
Dept: HEMATOLOGY ONCOLOGY | Facility: CLINIC | Age: 84
End: 2024-11-27
Payer: MEDICARE

## 2024-11-27 ENCOUNTER — APPOINTMENT (OUTPATIENT)
Dept: HEMATOLOGY ONCOLOGY | Facility: CLINIC | Age: 84
End: 2024-11-27

## 2024-11-27 ENCOUNTER — NON-APPOINTMENT (OUTPATIENT)
Age: 84
End: 2024-11-27

## 2024-11-27 ENCOUNTER — LABORATORY RESULT (OUTPATIENT)
Age: 84
End: 2024-11-27

## 2024-11-27 VITALS
SYSTOLIC BLOOD PRESSURE: 143 MMHG | RESPIRATION RATE: 16 BRPM | TEMPERATURE: 97 F | WEIGHT: 86.86 LBS | OXYGEN SATURATION: 96 % | HEART RATE: 67 BPM | BODY MASS INDEX: 16.4 KG/M2 | DIASTOLIC BLOOD PRESSURE: 78 MMHG

## 2024-11-27 DIAGNOSIS — D47.2 MONOCLONAL GAMMOPATHY: ICD-10-CM

## 2024-11-27 DIAGNOSIS — Z79.01 LONG TERM (CURRENT) USE OF ANTICOAGULANTS: ICD-10-CM

## 2024-11-27 DIAGNOSIS — D50.9 IRON DEFICIENCY ANEMIA, UNSPECIFIED: ICD-10-CM

## 2024-11-27 DIAGNOSIS — E16.2 HYPOGLYCEMIA, UNSPECIFIED: ICD-10-CM

## 2024-11-27 DIAGNOSIS — I26.99 OTHER PULMONARY EMBOLISM W/OUT ACUTE COR PULMONALE: ICD-10-CM

## 2024-11-27 DIAGNOSIS — I82.402 ACUTE EMBOLISM AND THROMBOSIS OF UNSPECIFIED DEEP VEINS OF LEFT LOWER EXTREMITY: ICD-10-CM

## 2024-11-27 DIAGNOSIS — I48.91 UNSPECIFIED ATRIAL FIBRILLATION: ICD-10-CM

## 2024-11-27 DIAGNOSIS — D47.3 ESSENTIAL (HEMORRHAGIC) THROMBOCYTHEMIA: ICD-10-CM

## 2024-11-27 DIAGNOSIS — D68.51 ACTIVATED PROTEIN C RESISTANCE: ICD-10-CM

## 2024-11-27 LAB
BASOPHILS # BLD AUTO: 0.04 K/UL — SIGNIFICANT CHANGE UP (ref 0–0.2)
BASOPHILS NFR BLD AUTO: 0.4 % — SIGNIFICANT CHANGE UP (ref 0–2)
EOSINOPHIL # BLD AUTO: 0.12 K/UL — SIGNIFICANT CHANGE UP (ref 0–0.5)
EOSINOPHIL NFR BLD AUTO: 1.3 % — SIGNIFICANT CHANGE UP (ref 0–6)
HCT VFR BLD CALC: 44.8 % — SIGNIFICANT CHANGE UP (ref 34.5–45)
HGB BLD-MCNC: 13.9 G/DL — SIGNIFICANT CHANGE UP (ref 11.5–15.5)
IMM GRANULOCYTES NFR BLD AUTO: 0.7 % — SIGNIFICANT CHANGE UP (ref 0–0.9)
LYMPHOCYTES # BLD AUTO: 1.03 K/UL — SIGNIFICANT CHANGE UP (ref 1–3.3)
LYMPHOCYTES # BLD AUTO: 10.8 % — LOW (ref 13–44)
MCHC RBC-ENTMCNC: 28.4 PG — SIGNIFICANT CHANGE UP (ref 27–34)
MCHC RBC-ENTMCNC: 31 G/DL — LOW (ref 32–36)
MCV RBC AUTO: 91.6 FL — SIGNIFICANT CHANGE UP (ref 80–100)
MONOCYTES # BLD AUTO: 0.7 K/UL — SIGNIFICANT CHANGE UP (ref 0–0.9)
MONOCYTES NFR BLD AUTO: 7.3 % — SIGNIFICANT CHANGE UP (ref 2–14)
NEUTROPHILS # BLD AUTO: 7.58 K/UL — HIGH (ref 1.8–7.4)
NEUTROPHILS NFR BLD AUTO: 79.5 % — HIGH (ref 43–77)
NRBC # BLD: 0 /100 WBCS — SIGNIFICANT CHANGE UP (ref 0–0)
NRBC BLD-RTO: 0 /100 WBCS — SIGNIFICANT CHANGE UP (ref 0–0)
PLATELET # BLD AUTO: 519 K/UL — HIGH (ref 150–400)
RBC # BLD: 4.89 M/UL — SIGNIFICANT CHANGE UP (ref 3.8–5.2)
RBC # FLD: 16 % — HIGH (ref 10.3–14.5)
WBC # BLD: 9.54 K/UL — SIGNIFICANT CHANGE UP (ref 3.8–10.5)
WBC # FLD AUTO: 9.54 K/UL — SIGNIFICANT CHANGE UP (ref 3.8–10.5)

## 2024-11-27 PROCEDURE — 99215 OFFICE O/P EST HI 40 MIN: CPT

## 2024-11-27 PROCEDURE — G2211 COMPLEX E/M VISIT ADD ON: CPT

## 2024-11-28 PROBLEM — E16.2 HYPOGLYCEMIA: Status: ACTIVE | Noted: 2024-11-28

## 2024-11-29 LAB
ALBUMIN SERPL ELPH-MCNC: 4.5 G/DL
ALP BLD-CCNC: 181 U/L
ALT SERPL-CCNC: 26 U/L
ANION GAP SERPL CALC-SCNC: 16 MMOL/L
AST SERPL-CCNC: 38 U/L
BILIRUB SERPL-MCNC: 0.4 MG/DL
BUN SERPL-MCNC: 52 MG/DL
CALCIUM SERPL-MCNC: 10.4 MG/DL
CHLORIDE SERPL-SCNC: 97 MMOL/L
CO2 SERPL-SCNC: 27 MMOL/L
CREAT SERPL-MCNC: 1.64 MG/DL
DEPRECATED KAPPA LC FREE/LAMBDA SER: 1.41 RATIO
EGFR: 31 ML/MIN/1.73M2
GLUCOSE SERPL-MCNC: 49 MG/DL
IGA SER QL IEP: 533 MG/DL
IGG SER QL IEP: 2061 MG/DL
IGM SER QL IEP: 193 MG/DL
KAPPA LC CSF-MCNC: 8.12 MG/DL
KAPPA LC SERPL-MCNC: 11.44 MG/DL
LDH SERPL-CCNC: 371 U/L
POTASSIUM SERPL-SCNC: 5.1 MMOL/L
PROT SERPL-MCNC: 8.6 G/DL
PROT SERPL-MCNC: 8.6 G/DL
SODIUM SERPL-SCNC: 139 MMOL/L

## 2025-02-28 DIAGNOSIS — D47.2 MONOCLONAL GAMMOPATHY: ICD-10-CM

## 2025-03-04 ENCOUNTER — APPOINTMENT (OUTPATIENT)
Dept: HEMATOLOGY ONCOLOGY | Facility: CLINIC | Age: 85
End: 2025-03-04

## 2025-04-25 NOTE — PATIENT PROFILE ADULT - FALLEN IN THE PAST
"    Assessed at bedside via telephone with Dr. Nielson from ophthalmology  Right eye blurry with \"radial lines\"  Pupil ~ 5mm (larger than left)  No visual cut, peripheral vision intact  No floaters or flashing lights   Intraocular pressure not necessary per Dr. Naga Nielson felt this was lens detachment vs traumatic mydriasis.   Recommend follow up outpatient.   Will see same day or next day from discharge.  Call office to arrange.    IF THERE ARE ANY SUDDEN CHANGES IN VISION - WE ARE TO CALL DR. NIELSON ON HIS CELL AND HE WILL COME TO BEDSIDE - [phone]. PT AWARE OF PLAN  " yes